# Patient Record
Sex: MALE | Race: WHITE | NOT HISPANIC OR LATINO | Employment: OTHER | URBAN - METROPOLITAN AREA
[De-identification: names, ages, dates, MRNs, and addresses within clinical notes are randomized per-mention and may not be internally consistent; named-entity substitution may affect disease eponyms.]

---

## 2017-01-14 ENCOUNTER — MYC MEDICAL ADVICE (OUTPATIENT)
Dept: FAMILY MEDICINE | Facility: CLINIC | Age: 37
End: 2017-01-14

## 2017-01-14 DIAGNOSIS — F33.0 MAJOR DEPRESSIVE DISORDER, RECURRENT EPISODE, MILD (H): Primary | ICD-10-CM

## 2017-03-01 ENCOUNTER — MYC MEDICAL ADVICE (OUTPATIENT)
Dept: FAMILY MEDICINE | Facility: CLINIC | Age: 37
End: 2017-03-01

## 2017-03-01 DIAGNOSIS — F33.0 MAJOR DEPRESSIVE DISORDER, RECURRENT EPISODE, MILD (H): Primary | ICD-10-CM

## 2017-03-12 DIAGNOSIS — F33.0 MAJOR DEPRESSIVE DISORDER, RECURRENT EPISODE, MILD (H): ICD-10-CM

## 2017-03-12 NOTE — TELEPHONE ENCOUNTER
Trazodone       Last Written Prescription Date: 12/15/16  Last Fill Quantity: 90; # refills: 0  Last Office Visit with [facility], [facility] or  Health prescribing provider:  09/06/16        Last PHQ-9 score on record=   PHQ-9 SCORE 9/6/2016   Total Score 11       Lab Results   Component Value Date    AST 20 02/09/2016     Lab Results   Component Value Date    ALT 31 02/09/2016

## 2017-03-13 RX ORDER — TRAZODONE HYDROCHLORIDE 50 MG/1
50 TABLET, FILM COATED ORAL
Qty: 90 TABLET | Refills: 1 | Status: SHIPPED | OUTPATIENT
Start: 2017-03-13 | End: 2017-04-12 | Stop reason: ALTCHOICE

## 2017-03-21 ENCOUNTER — OFFICE VISIT (OUTPATIENT)
Dept: PSYCHIATRY | Facility: CLINIC | Age: 37
End: 2017-03-21
Attending: PHYSICIAN ASSISTANT
Payer: COMMERCIAL

## 2017-03-21 VITALS
HEART RATE: 106 BPM | TEMPERATURE: 96.8 F | WEIGHT: 186.6 LBS | HEIGHT: 74 IN | SYSTOLIC BLOOD PRESSURE: 130 MMHG | DIASTOLIC BLOOD PRESSURE: 81 MMHG | BODY MASS INDEX: 23.95 KG/M2

## 2017-03-21 DIAGNOSIS — F33.1 MAJOR DEPRESSIVE DISORDER, RECURRENT EPISODE, MODERATE (H): Primary | ICD-10-CM

## 2017-03-21 DIAGNOSIS — F41.1 GAD (GENERALIZED ANXIETY DISORDER): ICD-10-CM

## 2017-03-21 PROCEDURE — 90792 PSYCH DIAG EVAL W/MED SRVCS: CPT | Performed by: CLINICAL NURSE SPECIALIST

## 2017-03-21 RX ORDER — MIRTAZAPINE 15 MG/1
15 TABLET, FILM COATED ORAL AT BEDTIME
Qty: 30 TABLET | Refills: 1 | Status: SHIPPED | OUTPATIENT
Start: 2017-03-21 | End: 2017-04-04 | Stop reason: DRUGHIGH

## 2017-03-21 RX ORDER — BUPROPION HYDROCHLORIDE 150 MG/1
150 TABLET ORAL EVERY MORNING
Qty: 7 TABLET | Refills: 0 | Status: SHIPPED | OUTPATIENT
Start: 2017-03-21 | End: 2017-04-12

## 2017-03-21 ASSESSMENT — ANXIETY QUESTIONNAIRES
5. BEING SO RESTLESS THAT IT IS HARD TO SIT STILL: NOT AT ALL
6. BECOMING EASILY ANNOYED OR IRRITABLE: NEARLY EVERY DAY
IF YOU CHECKED OFF ANY PROBLEMS ON THIS QUESTIONNAIRE, HOW DIFFICULT HAVE THESE PROBLEMS MADE IT FOR YOU TO DO YOUR WORK, TAKE CARE OF THINGS AT HOME, OR GET ALONG WITH OTHER PEOPLE: VERY DIFFICULT
4. TROUBLE RELAXING: SEVERAL DAYS
7. FEELING AFRAID AS IF SOMETHING AWFUL MIGHT HAPPEN: SEVERAL DAYS
GAD7 TOTAL SCORE: 11
3. WORRYING TOO MUCH ABOUT DIFFERENT THINGS: SEVERAL DAYS
1. FEELING NERVOUS, ANXIOUS, OR ON EDGE: NEARLY EVERY DAY
2. NOT BEING ABLE TO STOP OR CONTROL WORRYING: MORE THAN HALF THE DAYS

## 2017-03-21 NOTE — MR AVS SNAPSHOT
After Visit Summary   3/21/2017    Timmy Yousif    MRN: [id number]           Patient Information     Date Of Birth          1980        Visit Information        Provider Department      3/21/2017 1:15 PM Carli Vee APRN [facility]        Today's Diagnoses     Major depressive disorder, recurrent episode, moderate (H)    -  1    ROSARIO (generalized anxiety disorder)          Care Instructions    Treatment Plan:  Discontinue trazodone (Desyrel). Taper to discontinue bupropion (Wellbutrin) XL by reducing to 150 mg for one week, then discontinue.     Begin mirtazapine (Remeron) 15 mg at bedtime.     Follow up in one month.     - Recommend patient discuss medications with their pharmacist.   - Safety plan was reviewed; to the ER as needed or call after hours crisis line; [phone]  - Education and counseling was done regarding use of medications, psychotherapy options  - Call [phone] for appointment or to speak to a nurse.   -Office hours: Monday through Thursday 8:00 am to 4:30 pm; Friday 8:00 am to Noon.   - Patient was given a copy of this Treatment Plan today.        Follow-ups after your visit        Who to contact     If you have questions or need follow up information about today's clinic visit or your schedule please contact [facility] directly at [phone].  Normal or non-critical lab and imaging results will be communicated to you by MyChart, letter or phone within 4 business days after the clinic has received the results. If you do not hear from us within 7 days, please contact the clinic through MyChart or phone. If you have a critical or abnormal lab result, we will notify you by phone as soon as possible.  Submit refill requests through Down or call your pharmacy and they will forward the refill request to us. Please allow 3 business days for your refill to be completed.          Additional Information About Your Visit       "  stickKhart Information     Yumber gives you secure access to your electronic health record. If you see a primary care provider, you can also send messages to your care team and make appointments. If you have questions, please call your primary care clinic.  If you do not have a primary care provider, please call [phone] and they will assist you.        Care EveryWhere ID     This is your Care EveryWhere ID. This could be used by other organizations to access your [place] medical records  [id number]        Your Vitals Were     Pulse Temperature Height BMI (Body Mass Index)          106 96.8  F (36  C) (Tympanic) 6' 2.25\" (1.886 m) 23.8 kg/m2         Blood Pressure from Last 3 Encounters:   03/21/17 130/81   12/30/16 (!) 151/101   09/06/16 137/86    Weight from Last 3 Encounters:   03/21/17 186 lb 9.6 oz (84.6 kg)   12/30/16 190 lb (86.2 kg)   09/06/16 189 lb 3.2 oz (85.8 kg)              Today, you had the following     No orders found for display         Today's Medication Changes          These changes are accurate as of: 3/21/17  2:24 PM.  If you have any questions, ask your nurse or doctor.               Start taking these medicines.        Dose/Directions    mirtazapine 15 MG tablet   Commonly known as:  REMERON   Used for:  Major depressive disorder, recurrent episode, moderate (H), ROSARIO (generalized anxiety disorder)   Started by:  Carli Vee APRN CNS        Dose:  15 mg   Take 1 tablet (15 mg) by mouth At Bedtime   Quantity:  30 tablet   Refills:  1         These medicines have changed or have updated prescriptions.        Dose/Directions    buPROPion 150 MG 24 hr tablet   Commonly known as:  WELLBUTRIN XL   This may have changed:    - medication strength  - how much to take   Used for:  Major depressive disorder, recurrent episode, moderate (H)   Changed by:  Carli Vee APRN CNS        Dose:  150 mg   Take 1 tablet (150 mg) by mouth every morning   Quantity:  7 tablet "   Refills:  0            Where to get your medicines      These medications were sent to [facility] [place], [place] - [address]  [address] [address]     Phone:  [phone]     buPROPion 150 MG 24 hr tablet    mirtazapine 15 MG tablet                Primary Care Provider Office Phone # Fax #    Jair Huffman PA-C [phone]       [address] [address]        Thank you!     Thank you for choosing [facility]  for your care. Our goal is always to provide you with excellent care. Hearing back from our patients is one way we can continue to improve our services. Please take a few minutes to complete the written survey that you may receive in the mail after your visit with us. Thank you!             Your Updated Medication List - Protect others around you: Learn how to safely use, store and throw away your medicines at www.disposemymeds.org.          This list is accurate as of: 3/21/17  2:24 PM.  Always use your most recent med list.                   Brand Name Dispense Instructions for use    buPROPion 150 MG 24 hr tablet    WELLBUTRIN XL    7 tablet    Take 1 tablet (150 mg) by mouth every morning       cyclobenzaprine 10 MG tablet    FLEXERIL    90 tablet    Take 1 tablet (10 mg) by mouth 3 times daily as needed for muscle spasms       lisinopril 10 MG tablet    PRINIVIL/ZESTRIL    90 tablet    Take 1 tablet (10 mg) by mouth daily       mirtazapine 15 MG tablet    REMERON    30 tablet    Take 1 tablet (15 mg) by mouth At Bedtime       oxyCODONE-acetaminophen 5-325 MG per tablet    PERCOCET    20 tablet    Take 1-2 tablets by mouth every 4 hours as needed for moderate to severe pain       traZODone 50 MG tablet    DESYREL    90 tablet    Take 1 tablet (50 mg) by mouth nightly as needed for sleep

## 2017-03-21 NOTE — PROGRESS NOTES
Outpatient Psychiatric Evaluation-Standard    Name: Timmy Yousif  : 1980  Date: 3/21/2017    Source of Referral:  Primary Care Physician: Jair Huffman  Current Psychotherapist: None     Identifying Data:  Patient is a 36 year old,  male who presents for initial visit with me.  Patient is currently employed part time, .  Patient attended the session with wife, Sugar, verbal permission was given for Sugar to remain in the appointment.   60 minutes were spent on evaluation with 40 minutes CC time.    HPI:  Patient reports approximately three years ago, patient noticed depression and poor sleep, was diagnosed with ELISHA. Patient is currently using dental appliance. Patient reports over the past three years, has tried citalopram (Celexa) was ineffective, venlafaxine (Effexor) took one dose and experienced hyperhidrosis and increased irritability. Patient has been taking bupropion (Wellbutrin)  mg for the past several months. Patient reports no change since starting or increasing the bupropion (Wellbutrin) dose. Patient reports anxiety started related to work stress and raising three small children, leading to depression.     Psychiatric Review of Symptoms:  Depression: Sleep: Decrease and ELISHA diagnosis two years ago using dental appliance.   Appetite: Decrease and weight loss over the last couple years and has leveled out.   Depressed Mood Interest: Decrease Energy: Decrease Concentration: Decrease Worthless: Increase and in the past year.     Last PHQ-9 score = No Value exists for the : HP#PHQ9  Quyen:  No symptoms  Mood Disorder Questionnaire: Negative    Anxiety: Feeling nervous or on edge  Uncontrolled worrying  Trouble relaxing  Easily annoyed or irritable  Thoughts of impending doom    GAD7 score: 11  Panic:  No symptoms  Palpitations  Shortness of Breath  up to twice per month  Agoraphobia:  No  OCD:  No  "symptoms  Psychosis: No symptoms  ADD / ADHD: No symptoms  Gambling or shoplifting: No  Eating Disorder:  No symptoms  Suicide attempts:  No  Current SI risk:  No Protective factors: family    Significant Losses / Trauma / Abuse / Neglect Issues:  There are no indications or report of: significant losses, trauma, abuse or neglect.    PTSD:  No symptoms    Issues of possible neglect are not present.    A safety and risk management plan has not been developed at this time, however client was given the after-hours number / 911 should there be a change in any of these risk factors.      Psychiatric History:   Hospitalizations: None  Past psychotherapy: medication(s) from physician / PCP    Past medication trials: (patient was presented with a list to review all currently available antidepressants, mood stabilizers, tranquilizers, hypnotics and antipsychotics)  New Antidepressants:  Celexa (citalopram) and Wellbutrin, Zyban, Aplenzin (bupropion), venlafaxine (Effexor)   Sedatives/Hypnotics:  Desyrel (trazadone)  Tranquilizers: diazepam (Valium)       Chemical Use History:  Patient has not received chemical dependency treatment in the past.  Patient reports no problems as a result of their drinking / drug use.  Current use of drugs or alcohol: alcohol- occasional  CAGE: G     Patient felt bad or GUILTY about their drinking (or drug use).   Based on the negative Cage-Aid score and clinical interview there  are not indications of drug or alcohol abuse.  Tobacco use: No  Ready to quit?  No  NRT tried: NA    Past Medical History:  Surgery: No past surgical history on file.  Allergies:    Allergies   Allergen Reactions     Penicillin [Esters]      Primary MD: Jair Huffman  Seizures or head injury: No  Diet: \"Normal\"  Exercise: no regular exercise program  Supplements: Vitamin D    Current Medications:  Current Outpatient Prescriptions   Medication Sig     traZODone (DESYREL) 50 MG tablet Take 1 tablet (50 mg) by mouth " "nightly as needed for sleep     oxyCODONE-acetaminophen (PERCOCET) 5-325 MG per tablet Take 1-2 tablets by mouth every 4 hours as needed for moderate to severe pain     buPROPion (WELLBUTRIN XL) 300 MG 24 hr tablet Take 1 tablet (300 mg) by mouth every morning     cyclobenzaprine (FLEXERIL) 10 MG tablet Take 1 tablet (10 mg) by mouth 3 times daily as needed for muscle spasms     lisinopril (PRINIVIL,ZESTRIL) 10 MG tablet Take 1 tablet (10 mg) by mouth daily     No current facility-administered medications for this visit.        Vital Signs:  /81 (BP Location: Left arm, Patient Position: Chair, Cuff Size: Adult Regular)  Pulse 106  Temp 96.8  F (36  C) (Tympanic)  Ht 6' 2.25\" (1.886 m)  Wt 186 lb 9.6 oz (84.6 kg)  BMI 23.8 kg/m2      Review of Systems:  (constitutional, HEENT, Neuro, Cardiac, Pulmonary, GI, , Heme / Lymph, Endocrine, Skin / Breast, MSK reviewed)  10 point ROS was negative except for the following: chronic back pain.     Family History:   (with focus on psychiatric and substance abuse)  Chemical use problems None  Mental health history: mother and sister - depression and anxiety  Patient reports family history includes CANCER in his father; DIABETES in his mother; Other Cancer in his father.    Social History:   Patient grew up in [place], [place]    Siblings: 2 sisters  Intact family growing up?; Yes  Highest education level was high school graduate.   Marital status and living situation: Lives with wife, Sugar and three daughters.   three children. Ages 4, 2, 5 months  he has not been involved with the legal system.      Mental Status Assessment:     Appearance:  Well groomed      Behavior/relationship to examiner/demeanor:  Cooperative, engaged and pleasant  Motor activity:  Normal  Gait:  Normal   Speech:  Normal in volume, articulation, coherence   Mood (subjective report):  \"poor\"  Affect (objective appearance):  Mood congruent  Thought Process (Associations):  Logical, linear and goal " directed  Thought content:  No evidence of suicidal or homicidal ideation,          no overt psychosis and                    patient does not appear to be responding to internal stimuli  Oriented to person, place, date/time   Attention Span and concentration: Intact   Memory:  Short-term memory intact and Long-term memory; Intact  Language:  Fluent   Fund of Knowledge/Intelligence:  Average  Use of language: Intact   Abstraction:  Normal  Insight:  Adequate  Judgment:  Adequate for safety    DSM5  Diagnosis:    296.22 Major Depressive Disorder, Single Episode, Moderate _ and With anxious distress  300.01 (F41.0) Panic Disorder  300.02 (F41.1) Generalized Anxiety Disorder  Psychosocial & Contextual Factors: financial issues, childcare issues.     Strengths and Liabilities:   Patient identified the following strengths or resources that will help him  succeed in counseling: friends / good social support and family support.  Things that may interfere with the patient's success include:financial hardship.    WHODAS 2.0 TOTAL SCORES 3/21/2017   Total Score 28         Impression:  Patient has anxiety leading to depressive symptoms. Patient has tried citalopram (Celexa) and venlafaxine (Effexor) and now bupropion (Wellbutrin) with really no benefit. Patient is not sleeping well. Mirtazapine (Remeron) is a good option for anxiety, depression and sleep. Another option is to try sertraline (Zoloft) which is helpful for his mother.     Medication side effects and alternatives reviewed.     Treatment Plan:  Discontinue trazodone (Desyrel). Taper to discontinue bupropion (Wellbutrin) XL by reducing to 150 mg for one week, then discontinue.     Begin mirtazapine (Remeron) 15 mg at bedtime.     Follow up in one month.     - Recommend patient discuss medications with their pharmacist.   - Safety plan was reviewed; to the ER as needed or call after hours crisis line; [phone]  - Education and counseling was done regarding use of  medications, psychotherapy options  - Call [phone] for appointment or to speak to a nurse.   -Office hours: Monday through Thursday 8:00 am to 4:30 pm; Friday 8:00 am to Noon.   - Patient was given a copy of this Treatment Plan today.     My Practice Policy was reviewed and signed: YES       Patient will continue to be seen for ongoing consultation and stabilization.      Signed: Carli Vee RN, MS, APRN                 Psychiatry

## 2017-03-21 NOTE — PATIENT INSTRUCTIONS
Treatment Plan:  Discontinue trazodone (Desyrel). Taper to discontinue bupropion (Wellbutrin) XL by reducing to 150 mg for one week, then discontinue.     Begin mirtazapine (Remeron) 15 mg at bedtime.     Follow up in one month.     - Recommend patient discuss medications with their pharmacist.   - Safety plan was reviewed; to the ER as needed or call after hours crisis line; [phone]  - Education and counseling was done regarding use of medications, psychotherapy options  - Call [phone] for appointment or to speak to a nurse.   -Office hours: Monday through Thursday 8:00 am to 4:30 pm; Friday 8:00 am to Noon.   - Patient was given a copy of this Treatment Plan today.

## 2017-03-22 ASSESSMENT — PATIENT HEALTH QUESTIONNAIRE - PHQ9: SUM OF ALL RESPONSES TO PHQ QUESTIONS 1-9: 18

## 2017-03-22 ASSESSMENT — ANXIETY QUESTIONNAIRES: GAD7 TOTAL SCORE: 11

## 2017-03-23 ENCOUNTER — TELEPHONE (OUTPATIENT)
Dept: PSYCHIATRY | Facility: CLINIC | Age: 37
End: 2017-03-23

## 2017-03-23 NOTE — TELEPHONE ENCOUNTER
----- Message from Carli Vee, CARIE CNS sent at 3/22/2017  7:42 AM CDT -----  Please contact this patient for [employer] testing. Thanks.

## 2017-03-23 NOTE — TELEPHONE ENCOUNTER
Outreach attempted to discuss Genesight testing. Number of [employer] customer service left for patient on his voicemail. He needs to call us if and when he decides to send the test in. He should call [employer] directly to discuss his financial and insurance information with them.

## 2017-04-04 ENCOUNTER — MYC MEDICAL ADVICE (OUTPATIENT)
Dept: PSYCHIATRY | Facility: CLINIC | Age: 37
End: 2017-04-04

## 2017-04-04 DIAGNOSIS — F33.1 MAJOR DEPRESSIVE DISORDER, RECURRENT EPISODE, MODERATE (H): Primary | ICD-10-CM

## 2017-04-04 RX ORDER — MIRTAZAPINE 30 MG/1
30 TABLET, FILM COATED ORAL AT BEDTIME
Qty: 30 TABLET | Refills: 1 | Status: SHIPPED | OUTPATIENT
Start: 2017-04-04 | End: 2017-05-31

## 2017-04-04 NOTE — TELEPHONE ENCOUNTER
Try mirtazapine (Remeron) 30 mg daily -  He may need to take it earlier in the evening. I will send a new Rx.

## 2017-04-06 ENCOUNTER — MYC MEDICAL ADVICE (OUTPATIENT)
Dept: PSYCHIATRY | Facility: CLINIC | Age: 37
End: 2017-04-06

## 2017-04-07 ENCOUNTER — TELEPHONE (OUTPATIENT)
Dept: PSYCHIATRY | Facility: CLINIC | Age: 37
End: 2017-04-07

## 2017-04-07 NOTE — TELEPHONE ENCOUNTER
Reason for call: Other   Patient called regarding (reason for call): Wife is requesting genesight testing be ordered.  Additional comments: Verbal permission was given to speak with wife.    Phone Number Pt can be reached at: Home number on file [phone] (home)  Best Time: anytime  Can we leave a detailed message on this number? VM was left so this info was not gathered

## 2017-04-07 NOTE — TELEPHONE ENCOUNTER
GeneSight test ordered.   Patient Medical Record Information  Timmy Yousif  : 1980        Diagnoses  According to DSM-V criteria, patient is suffering from refractory moderate to severe depression.          ICD-10 Codes    (F33.1) Major depressive disorder, recurrent, moderate        (F33.1) Major depressive disorder, recurrent, moderate        (F33.1) Major depressive disorder, recurrent, moderate        Clinical Notations  Patient has failed or is currently failing at least one neuropsychiatric medication.  Medication(s) failed or failing: Celexa, Wellbutrin, Effexor    Patient has failed or is currently failing at least one neuropsychiatric medication and I am contemplating an alteration in a neuropsychiatric medication treatment.  I am considering folate supplementation for patient.        I am contemplating an alteration in a neuropsychiatric medication treatment, which may include medication elimination, switching, augmentation, and/or dose adjustment, and therefore have ordered [employer] Psychotropic, ADHD, MTHFR on 2017.      Ordered at the direction of: Carli Vee

## 2017-04-07 NOTE — TELEPHONE ENCOUNTER
Pt sent [employer] message. Ordered [employer] and sent [employer] message back to Pt.

## 2017-04-12 ENCOUNTER — TRANSFERRED RECORDS (OUTPATIENT)
Dept: HEALTH INFORMATION MANAGEMENT | Facility: CLINIC | Age: 37
End: 2017-04-12

## 2017-04-12 ENCOUNTER — OFFICE VISIT (OUTPATIENT)
Dept: PSYCHIATRY | Facility: CLINIC | Age: 37
End: 2017-04-12
Payer: COMMERCIAL

## 2017-04-12 VITALS
HEART RATE: 101 BPM | DIASTOLIC BLOOD PRESSURE: 82 MMHG | BODY MASS INDEX: 25 KG/M2 | SYSTOLIC BLOOD PRESSURE: 132 MMHG | TEMPERATURE: 97.4 F | WEIGHT: 196 LBS

## 2017-04-12 DIAGNOSIS — F33.1 MAJOR DEPRESSIVE DISORDER, RECURRENT EPISODE, MODERATE (H): Primary | ICD-10-CM

## 2017-04-12 DIAGNOSIS — F41.1 GAD (GENERALIZED ANXIETY DISORDER): ICD-10-CM

## 2017-04-12 PROCEDURE — 99214 OFFICE O/P EST MOD 30 MIN: CPT | Performed by: CLINICAL NURSE SPECIALIST

## 2017-04-12 ASSESSMENT — ANXIETY QUESTIONNAIRES
2. NOT BEING ABLE TO STOP OR CONTROL WORRYING: MORE THAN HALF THE DAYS
GAD7 TOTAL SCORE: 14
5. BEING SO RESTLESS THAT IT IS HARD TO SIT STILL: MORE THAN HALF THE DAYS
6. BECOMING EASILY ANNOYED OR IRRITABLE: NEARLY EVERY DAY
7. FEELING AFRAID AS IF SOMETHING AWFUL MIGHT HAPPEN: SEVERAL DAYS
IF YOU CHECKED OFF ANY PROBLEMS ON THIS QUESTIONNAIRE, HOW DIFFICULT HAVE THESE PROBLEMS MADE IT FOR YOU TO DO YOUR WORK, TAKE CARE OF THINGS AT HOME, OR GET ALONG WITH OTHER PEOPLE: SOMEWHAT DIFFICULT
3. WORRYING TOO MUCH ABOUT DIFFERENT THINGS: MORE THAN HALF THE DAYS
1. FEELING NERVOUS, ANXIOUS, OR ON EDGE: MORE THAN HALF THE DAYS

## 2017-04-12 ASSESSMENT — PATIENT HEALTH QUESTIONNAIRE - PHQ9: 5. POOR APPETITE OR OVEREATING: MORE THAN HALF THE DAYS

## 2017-04-12 NOTE — PROGRESS NOTES
"      Psychiatric Progress Note    Name: Timmy Yousif  Date: 4/12/2017  Length of Visit: 30 minutes  MRN: [id number]      Current Outpatient Prescriptions   Medication Sig     mirtazapine (REMERON) 30 MG tablet Take 1 tablet (30 mg) by mouth At Bedtime     oxyCODONE-acetaminophen (PERCOCET) 5-325 MG per tablet Take 1-2 tablets by mouth every 4 hours as needed for moderate to severe pain     cyclobenzaprine (FLEXERIL) 10 MG tablet Take 1 tablet (10 mg) by mouth 3 times daily as needed for muscle spasms     lisinopril (PRINIVIL,ZESTRIL) 10 MG tablet Take 1 tablet (10 mg) by mouth daily     buPROPion (WELLBUTRIN XL) 150 MG 24 hr tablet Take 1 tablet (150 mg) by mouth every morning (Patient not taking: Reported on 4/12/2017)     traZODone (DESYREL) 50 MG tablet Take 1 tablet (50 mg) by mouth nightly as needed for sleep (Patient not taking: Reported on 4/12/2017)     No current facility-administered medications for this visit.        Therapist:  None    PHQ-9:  PHQ-9 score:    PHQ-9 SCORE 3/21/2017   Total Score 18       ROSARIO-7:  ROSARIO-7 SCORE 8/25/2016 9/6/2016 3/21/2017   Total Score 13 12 11           Interim History:  Patient returns for follow up from initial appointment 3-. Mirtazapine (Remeron) 15 mg at bedtime was started. Patient phoned in reporting morning grogginess and the dose was increased to mirtazapine (Remeron) 30 mg at bedtime. Patient is accompanied by his wife, Sugar.     Today, patient reports sleeping 7-8 hours per night. Patient is having a difficult time waking up in the morning, lasting a few hours. Patient reports he feels better and \"My body feels more comfortable\". Patient states \"Food tastes good again\".     Sugar reports some improvement in patient's mood. Sugar reports going online reviewing ADHD symptoms and thinks this may a concern. Patient reports difficulty with task completion, fidgety, and decreased focus.      Past Medical History:   Diagnosis Date     Arthritis December 2009 " "   Back     Benign essential hypertension        10 point ROS is negative except for those listed above.     Vital Signs:   /82 (BP Location: Right arm, Patient Position: Chair, Cuff Size: Adult Regular)  Pulse 101  Temp 97.4  F (36.3  C) (Tympanic)  Wt 196 lb (88.9 kg)  BMI 25 kg/m2      Mental Status Assessment:  Appearance:  Well groomed      Behavior/relationship to examiner/demeanor:  Cooperative, engaged and pleasant  Motor activity:  Normal  Gait:  Normal   Speech:  Normal in volume, articulation, coherence   Mood (subjective report):  \"My body feels better\".   Affect (objective appearance):  Mood congruent  Thought Process (Associations):  Logical, linear and goal directed  Thought content:  No evidence of suicidal or homicidal ideation,          no overt psychosis and                    patient does not appear to be responding to internal stimuli  Oriented to person, place, date/time   Attention Span and concentration: Intact   Memory:  Short-term memory intact and Long-term memory; Intact  Language:  Fluent   Fund of Knowledge/Intelligence:  Average  Use of language: Intact   Abstraction:  Normal  Insight:  Adequate  Judgment:  Adequate for safety    DSM DIAGNOSIS:  296.22 Major Depressive Disorder, Single Episode, Moderate _ and With anxious distress  300.01 (F41.0) Panic Disorder  300.02 (F41.1) Generalized Anxiety Disorder    Assessment:  Patient is more talkative in the appointment today which is an improvement. He does not look for his wife to answer questions for him. He is sleeping well, but is having some difficulty waking in the morning, so will try taking mirtazapine (Remeron) earlier in the evening. Patient's appetite has returned and he is pleased with that. Patient and his wife question whether he may have ADHD and request to complete testing.     Medication side effects and alternatives were reviewed.     Treatment Plan:  Continue mirtazapine (Remeron) 30 mg, taking earlier in the " evening - try 8 pm or 6 pm.     Schedule ADHD testing.     Follow up with me after ADHD testing.     - Recommend patient discuss medications with their pharmacist.   - Safety plan was reviewed; to the ER as needed or call after hours crisis line; [phone]  - Education and counseling was done regarding use of medications, psychotherapy options  - Call [phone] for appointment or to speak to a nurse.    -Office hours: Monday through Thursday 8:00 am to 4:30 pm; Friday 8:00 am to Noon.   - Patient received a copy of this Treatment Plan today.    Patient will continue to be seen for ongoing consultation and stabilization.      Signed:  Carli Vee, RN, MS, CNS-BC

## 2017-04-12 NOTE — PATIENT INSTRUCTIONS
Treatment Plan:  Continue mirtazapine (Remeron) 30 mg, taking earlier in the evening - try 8 pm or 6 pm.     Schedule ADHD testing.     Follow up with me after ADHD testing.      - Recommend patient discuss medications with their pharmacist.   - Safety plan was reviewed; to the ER as needed or call after hours crisis line; [phone]  - Education and counseling was done regarding use of medications, psychotherapy options  - Call [phone] for appointment or to speak to a nurse.    -Office hours: Monday through Thursday 8:00 am to 4:30 pm; Friday 8:00 am to Noon.   - Patient received a copy of this Treatment Plan today.

## 2017-04-12 NOTE — MR AVS SNAPSHOT
After Visit Summary   4/12/2017    Timmy Yousif    MRN: [id number]           Patient Information     Date Of Birth          1980        Visit Information        Provider Department      4/12/2017 2:15 PM Carli Vee APRN [facility]        Care Instructions    Treatment Plan:  Continue mirtazapine (Remeron) 30 mg, taking earlier in the evening - try 8 pm or 6 pm.     Schedule ADHD testing.     Follow up with me after ADHD testing.      - Recommend patient discuss medications with their pharmacist.   - Safety plan was reviewed; to the ER as needed or call after hours crisis line; [phone]  - Education and counseling was done regarding use of medications, psychotherapy options  - Call [phone] for appointment or to speak to a nurse.    -Office hours: Monday through Thursday 8:00 am to 4:30 pm; Friday 8:00 am to Noon.   - Patient received a copy of this Treatment Plan today.        Follow-ups after your visit        Who to contact     If you have questions or need follow up information about today's clinic visit or your schedule please contact [facility] directly at [phone].  Normal or non-critical lab and imaging results will be communicated to you by [employer], letter or phone within 4 business days after the clinic has received the results. If you do not hear from us within 7 days, please contact the clinic through [employer] or phone. If you have a critical or abnormal lab result, we will notify you by phone as soon as possible.  Submit refill requests through WebChalet or call your pharmacy and they will forward the refill request to us. Please allow 3 business days for your refill to be completed.          Additional Information About Your Visit        MyChart Information     Web[employer]et gives you secure access to your electronic health record. If you see a primary care provider, you can also send messages to your care team and make  appointments. If you have questions, please call your primary care clinic.  If you do not have a primary care provider, please call [phone] and they will assist you.        Care EveryWhere ID     This is your Care EveryWhere ID. This could be used by other organizations to access your [facility] medical records  [id number]        Your Vitals Were     Pulse Temperature BMI (Body Mass Index)             101 97.4  F (36.3  C) (Tympanic) 25 kg/m2          Blood Pressure from Last 3 Encounters:   04/12/17 132/82   03/21/17 130/81   12/30/16 (!) 151/101    Weight from Last 3 Encounters:   04/12/17 196 lb (88.9 kg)   03/21/17 186 lb 9.6 oz (84.6 kg)   12/30/16 190 lb (86.2 kg)              Today, you had the following     No orders found for display         Today's Medication Changes          These changes are accurate as of: 4/12/17  2:51 PM.  If you have any questions, ask your nurse or doctor.               Stop taking these medicines if you haven't already. Please contact your care team if you have questions.     traZODone 50 MG tablet   Commonly known as:  DESYREL   Stopped by:  Carli Vee APRN CNS                    Primary Care Provider Office Phone # Fax #    Jair Huffman PA-C [phone]       [address] [address]        Thank you!     Thank you for choosing [facility]  for your care. Our goal is always to provide you with excellent care. Hearing back from our patients is one way we can continue to improve our services. Please take a few minutes to complete the written survey that you may receive in the mail after your visit with us. Thank you!             Your Updated Medication List - Protect others around you: Learn how to safely use, store and throw away your medicines at www.disposemymeds.org.          This list is accurate as of: 4/12/17  2:51 PM.  Always use your most recent med list.                   Brand Name  Dispense Instructions for use    cyclobenzaprine 10 MG tablet    FLEXERIL    90 tablet    Take 1 tablet (10 mg) by mouth 3 times daily as needed for muscle spasms       lisinopril 10 MG tablet    PRINIVIL/ZESTRIL    90 tablet    Take 1 tablet (10 mg) by mouth daily       mirtazapine 30 MG tablet    REMERON    30 tablet    Take 1 tablet (30 mg) by mouth At Bedtime       oxyCODONE-acetaminophen 5-325 MG per tablet    PERCOCET    20 tablet    Take 1-2 tablets by mouth every 4 hours as needed for moderate to severe pain

## 2017-04-13 ASSESSMENT — PATIENT HEALTH QUESTIONNAIRE - PHQ9: SUM OF ALL RESPONSES TO PHQ QUESTIONS 1-9: 14

## 2017-04-13 ASSESSMENT — ANXIETY QUESTIONNAIRES: GAD7 TOTAL SCORE: 14

## 2017-05-10 ENCOUNTER — FCC EXTENDED DOCUMENTATION (OUTPATIENT)
Dept: PSYCHOLOGY | Facility: CLINIC | Age: 37
End: 2017-05-10

## 2017-05-10 ENCOUNTER — OFFICE VISIT (OUTPATIENT)
Dept: PSYCHOLOGY | Facility: CLINIC | Age: 37
End: 2017-05-10
Payer: COMMERCIAL

## 2017-05-10 DIAGNOSIS — F33.0 MAJOR DEPRESSIVE DISORDER, RECURRENT EPISODE, MILD (H): Primary | ICD-10-CM

## 2017-05-10 PROCEDURE — 90834 PSYTX W PT 45 MINUTES: CPT | Performed by: PSYCHOLOGIST

## 2017-05-10 ASSESSMENT — ANXIETY QUESTIONNAIRES
1. FEELING NERVOUS, ANXIOUS, OR ON EDGE: MORE THAN HALF THE DAYS
6. BECOMING EASILY ANNOYED OR IRRITABLE: NEARLY EVERY DAY
IF YOU CHECKED OFF ANY PROBLEMS ON THIS QUESTIONNAIRE, HOW DIFFICULT HAVE THESE PROBLEMS MADE IT FOR YOU TO DO YOUR WORK, TAKE CARE OF THINGS AT HOME, OR GET ALONG WITH OTHER PEOPLE: VERY DIFFICULT
3. WORRYING TOO MUCH ABOUT DIFFERENT THINGS: SEVERAL DAYS
7. FEELING AFRAID AS IF SOMETHING AWFUL MIGHT HAPPEN: NOT AT ALL
GAD7 TOTAL SCORE: 11
5. BEING SO RESTLESS THAT IT IS HARD TO SIT STILL: NEARLY EVERY DAY
2. NOT BEING ABLE TO STOP OR CONTROL WORRYING: NOT AT ALL

## 2017-05-10 ASSESSMENT — PATIENT HEALTH QUESTIONNAIRE - PHQ9: 5. POOR APPETITE OR OVEREATING: MORE THAN HALF THE DAYS

## 2017-05-10 NOTE — MR AVS SNAPSHOT
MRN:[id number]                      After Visit Summary   5/10/2017    Timmy Yousif    MRN: [id number]           Visit Information        Provider Department      5/10/2017 1:00 PM Radha Hurt, PhD [facility] ADHD      Your next 10 appointments already scheduled     May 17, 2017  3:00 PM CDT   Return Visit with Radha Hurt, PhD   [facility] ([facility])    [address] [place] [id number] [phone]              MyChart Information     G[employer] gives you secure access to your electronic health record. If you see a primary care provider, you can also send messages to your care team and make appointments. If you have questions, please call your primary care clinic.  If you do not have a primary care provider, please call [phone] and they will assist you.        Care EveryWhere ID     This is your Care EveryWhere ID. This could be used by other organizations to access your [facility] medical records  [id number]

## 2017-05-10 NOTE — PROGRESS NOTES
Progress Note - Initial Session    Client Name:  Timmy Yousif Date: 5/10/2017         Service Type: Individual/ADHD Eval Intake      Session Start Time: 1:00  Session End Time: 1:45pm      Session Length: 45 minutes     Session #: 1     Attendees: Client attended alone    Diagnostic Assessment in progress.  Unable to complete documentation at the conclusion of the first session due to Unable to complete documentation at the conclusion of the first session due to gathering extensive information regarding symptom presentation, history, and impact on functioning. Client reported significant history of inattention. No risk issues reported.      Mental Status Assessment:  Appearance:   Appropriate   Eye Contact:   Good   Psychomotor Behavior: Normal   Attitude:   Cooperative   Orientation:   All  Speech   Rate / Production: Normal    Volume:  Normal   Mood:    Normal  Affect:    Appropriate   Thought Content:  Clear   Thought Form:  Coherent  Logical   Insight:    Good       Safety Issues and Plan for Safety and Risk Management:  Client denies current fears or concerns for personal safety.  Client denies current or recent suicidal ideation or behaviors.  Client denies current or recent homicidal ideation or behaviors.  Client denies current or recent self injurious behavior or ideation.  Client denies other safety concerns.  A safety and risk management plan has not been developed at this time, however client was given the after-hours number / 911 should there be a change in any of these risk factors.  Client reports there are no firearms in the house.      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 Health conditions  include  disease or illnesses, other health problems that may be short or long lasting,  injuries, mental health or emotional problems, and problems with alcohol or drugs.                     Think back over the past 30 days and answer these questions, thinking about how much  difficulty you had doing the following activities. For each question, please [place] only  one response.    S1 Standing for long periods such as 30 minutes? None =         1   S2 Taking care of household responsibilities? Moderate =   3   S3 Learning a new task, for example, learning how to get to a new place? Mild =           2   S4 How much of a problem do you have joining community activities (for example, festivals, [employer] or other activities) in the same way as anyone else can? Mild =           2   S5 How much have you been emotionally affected by your health problems? Moderate =   3     In the past 30 days, how much difficulty did you have in:   S6 Concentrating on doing something for ten minutes? Moderate =   3   S7 Walking a long distance such as a kilometer (or equivalent)? None =         1   S8 Washing your whole body? None =         1   S9 Getting dressed? None =         1   S10 Dealing with people you do not know? Mild =           2   S11 Maintaining a friendship? Mild =           2   S12 Your day to day work? Severe =       4     H1 Overall, in the past 30 days, how many days were these difficulties present? Record number of days \"most\"   H2 In the past 30 days, for how many days were you totally unable to carry out your usual activities or work because of any health condition? Record number of days  \"none\"   H3 In the past 30 days, not counting the days that you were totally unable, for how many days did you cut back or reduce your usual activities or work because of any health condition? Record number of days \"none\"       Collateral Reports Completed:  Routed note to Care Team " Member(s)      PLAN: (Homework, other):  RTC in one week to complete ADHD DA. Client was given self-report and collateral-report measures to complete and bring to our next session.      Radha Hurt, PhD, LP

## 2017-05-10 NOTE — Clinical Note
Manjeet Boyce,  I met with Timmy today to begin the ADHD evaluation. I will be sure to route the DA and final report to you. Please let me know if you have any questions or concerns. Thank you for the referral!  Radha Hurt

## 2017-05-11 ASSESSMENT — ANXIETY QUESTIONNAIRES: GAD7 TOTAL SCORE: 11

## 2017-05-11 ASSESSMENT — PATIENT HEALTH QUESTIONNAIRE - PHQ9: SUM OF ALL RESPONSES TO PHQ QUESTIONS 1-9: 10

## 2017-05-17 ENCOUNTER — DOCUMENTATION ONLY (OUTPATIENT)
Dept: PSYCHOLOGY | Facility: CLINIC | Age: 37
End: 2017-05-17
Payer: COMMERCIAL

## 2017-05-17 ENCOUNTER — OFFICE VISIT (OUTPATIENT)
Dept: PSYCHOLOGY | Facility: CLINIC | Age: 37
End: 2017-05-17
Payer: COMMERCIAL

## 2017-05-17 DIAGNOSIS — R69 DIAGNOSIS DEFERRED: Primary | ICD-10-CM

## 2017-05-17 PROCEDURE — 90791 PSYCH DIAGNOSTIC EVALUATION: CPT | Performed by: PSYCHOLOGIST

## 2017-05-17 PROCEDURE — 96101 HC PSYCHOLOGICAL TEST BY PSYCHOLOGIST/MD, PER HR: CPT | Performed by: PSYCHOLOGIST

## 2017-05-17 NOTE — PROGRESS NOTES
"Client Name: Timmy Yousif  MRN: [id number]  : 1980    Jeri Adult ADHD Rating Scale-IV: Self and Other Reports (BAARS-IV)  The BAARS-IV assesses for symptoms of ADHD that are experienced in one's daily life. This assessment measure includes self and collateral rating scales designed to provide information regarding current and childhood symptoms of ADHD including inattention, hyperactivity, and impulsivity. Self-report scores are reported as percentiles. Scores at the 76th-83rd percentile are considered marginal, scores at the 84th-92nd percentile are considered borderline, scores at the 93rd-95th percentile are considered mild, scores at the 96th-98th percentile are considered moderate, and those at the 99th percentile are considered severe. Collateral or \"other\" rating scales are reported as number of symptoms observed in comparison to those reported by the client. Norms and percentile scores are not available for collateral reports.      Current Symptoms Scale--Self Report:   Client completed the self-report inventory of current symptoms. The results indicate that the client's Total ADHD Score was 61 which places him in the 99th percentile for overall ADHD symptoms. In addition, the client endorsed the following occur \"often\" or \"very often\": 7/9 (98th percentile) Inattention symptoms, 7/9 (98th percentile) Hyperactivity/Impulsivity symptoms, and 4/9 (93rd percentile) Sluggish Cognitive Tempo symptoms. Client indicated that the reported symptoms have resulted in impaired functioning in school, home, and work. Overall, the results suggest the client is reporting moderate symptoms of hyperactivity/impulsivity and inattention at this time.      Current Symptoms Scale--Other Report:  Client's wife completed the collateral report inventory of current symptoms. Based on the collateral contact's observation of symptoms, the client demonstrates the following \"often\" or \"very often\": 6/9 Inattention symptoms, " "4/5 Hyperactivity symptoms, 3/4 Impulsivity symptoms, and 4/9 Sluggish Cognitive Tempo symptoms. The client's Total ADHD Score was 54. The collateral contact indicated the client demonstrated impaired functioning in work, home and social relationships. The collateral- and self-report scores are similar and suggest that the Client is experiencing symptoms of inattention and hyperactivity/impulsivity at this time.     Childhood Symptoms Scale--Self-Report:  Client completed the self-report inventory of childhood symptoms. The results indicate that the client's Total ADHD Score was 57 which places him in the 98th percentile for overall ADHD symptoms in childhood. In addition, the client endorsed having experienced the following \"often\" or \"very often\": 9/9 (99th percentile) Inattention symptoms and 8/9 (98th percentile) Hyperactivity-Impulsivity symptoms. Client indicated that the reported symptoms resulted in impaired functioning in school and home. Overall, the results suggest the client reported experiencing severe symptoms of inattention and moderate symptoms of hyperactivity/impulsivity as a child.     Childhood Symptoms Scale--Other Report:  Client's mother completed the collateral report inventory of childhood symptoms. Based on the collateral contact's recollection of client's childhood symptoms, the client demonstrated the following \"often\" or \"very often\": 7/9 Inattention symptoms and 8/9 Hyperactivity-Impulsivity symptoms. The client's Total ADHD Score was 56. The collateral contact indicated the client demonstrated impaired functioning in school, home, and social relationships. The collateral- and self-report scores are similar and suggest that the Client experienced/demonstrated symptoms of inattention and hyperactivity/impulsivity as a child.        Jeri Functional Impairment Scale: Self and Other Reports (BFIS)  The BFIS is used to assess an individuals' psychosocial impairment in major life/daily " "activities that may be due to a mental health disorder. This assessment measure includes self and collateral rating scales. Self-report scores are reported as percentiles. Scores at the 76th-83rd percentile are considered marginal, scores at the 84th-92nd percentile are considered borderline, scores at the 93rd-95th percentile are considered mild, scores at the 96th-98th percentile are considered moderate, and those at the 99th percentile are considered severe. Collateral or \"other\" rating scales are reported as number of symptoms observed in comparison to those reported by the client. Norms and percentile scores are not available for collateral reports.      Results indicate the client identified impairment (scores at or greater than 93rd percentile) in the following areas: home-family, home-chores, work, community activities, money management, driving, and daily responsibilities. The client's Mean Impairment Score was 5 (89th percentile) indicating the client is reporting borderline impairment in functioning across domains. Client's wife completed the collateral rating scale, which indicated similar results. The collateral contact's scores were generally higher than the client's report (e.g., Mean Impairment Score of 6.54). Client's wife noted impairment in the domains of home-family, home-chores, work, social-friends, community activities, marriage, driving, daily responsibilities, and childrearing.      Jeri Deficits in Executive Functioning Scale (BDEFS)  The BDEFS is a measure used for evaluating dimensions of adult executive functioning in daily life. This assessment measure includes self and collateral rating scales. Self-report scores are reported as percentiles. Scores at the 76th-83rd percentile are considered marginal, scores at the 84th-92nd percentile are considered borderline, scores at the 93rd-95th percentile are considered mild, scores at the 96th-98th percentile are considered moderate, and " "those at the 99th percentile are considered severe. Collateral or \"other\" rating scales are reported as number of symptoms observed in comparison to those reported by the client. Norms and percentile scores are not available for collateral reports.      Results indicate the client's Total Executive Functioning Score was 231 (95th percentile). The ADHD-Executive Functioning Index score was 30 (97th percentile). These scores suggest the client has mild to moderate deficits in executive functioning. These deficits may be due to ADHD or another mental health disorder. Results indicate the client identified significant deficits in the following areas: self-management to time (moderate), self-organization/problem solving (moderate), self-restraint (mild), and self-motivation (severe). Client's wife completed the collateral rating scale, which indicated similar results. She noted mild to moderate impairment across all domains.    Generalized Anxiety Disorder Questionnaire (ROSARIO-7)  This questionnaire is designed to screen for anxiety in adults. Based on the client's score of 10, he is reporting moderate symptoms of anxiety. Client identified the following symptoms of anxiety: feeling on edge/nervous/anxious, worrying about many different things, trouble relaxing, becoming easily annoyed or irritable and restlessness.     Patient Health Questionnaire- 9 (PHQ-9)   This questionnaire is designed to screen for depression in adults. Based on the client's score of 5, he is reporting mild symptoms of depression. Client identified the following symptoms of depression: lack of interest, feeling bad about self, and poor concentration.  "
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
## 2017-05-17 NOTE — MR AVS SNAPSHOT
MRN:[id number]                      After Visit Summary   5/17/2017    Timmy Yousif    MRN: [id number]           Visit Information        Provider Department      5/17/2017 3:00 PM Radha Hurt, PhD [facility] ADHD      MyChart Information     MyChart gives you secure access to your electronic health record. If you see a primary care provider, you can also send messages to your care team and make appointments. If you have questions, please call your primary care clinic.  If you do not have a primary care provider, please call [phone] and they will assist you.        Care EveryWhere ID     This is your Care EveryWhere ID. This could be used by other organizations to access your [place] medical records  [id number]

## 2017-05-17 NOTE — Clinical Note
Manjeet Boyce,  Here is the DA for the ADHD assessment. We are going to continue with testing; I will be sure to route a copy of the final report to you. Please let me know if you have any questions or concerns.  Thank you, Radha Hurt

## 2017-05-24 ENCOUNTER — OFFICE VISIT (OUTPATIENT)
Dept: PSYCHOLOGY | Facility: CLINIC | Age: 37
End: 2017-05-24
Payer: COMMERCIAL

## 2017-05-24 DIAGNOSIS — F33.0 MAJOR DEPRESSIVE DISORDER, RECURRENT EPISODE, MILD (H): Primary | ICD-10-CM

## 2017-05-24 PROCEDURE — 99207 ZZC NO CHARGE LOS: CPT | Performed by: PSYCHOLOGIST

## 2017-05-24 NOTE — MR AVS SNAPSHOT
MRN:[id number]                      After Visit Summary   5/24/2017    Timmy Yousif    MRN: [id number]           Visit Information        Provider Department      5/24/2017 2:00 PM Radha Hurt, PhD [facility] ADHD      MyChart Information     MyChart gives you secure access to your electronic health record. If you see a primary care provider, you can also send messages to your care team and make appointments. If you have questions, please call your primary care clinic.  If you do not have a primary care provider, please call [phone] and they will assist you.        Care EveryWhere ID     This is your Care EveryWhere ID. This could be used by other organizations to access your [place] medical records  [id number]        Equal Access to Services     MARCUS KIRKPATRICK : César Estrada, waaxda marilynadaha, qadianne kaalmabobo ghotra, collette goff. So [facility] [phone].    ATENCIÓN: Si habla español, tiene a logan disposición servicios gratuitos de asistencia lingüística. Llame al [phone].    We comply with applicable federal civil rights laws and [place] laws. We do not discriminate on the basis of race, color, national origin, age, disability sex, sexual orientation or gender identity.

## 2017-05-26 ENCOUNTER — DOCUMENTATION ONLY (OUTPATIENT)
Dept: PSYCHOLOGY | Facility: CLINIC | Age: 37
End: 2017-05-26
Payer: COMMERCIAL

## 2017-05-26 DIAGNOSIS — R69 DIAGNOSIS DEFERRED: Primary | ICD-10-CM

## 2017-05-26 PROCEDURE — 96101 HC PSYCHOLOGICAL TEST BY PSYCHOLOGIST/MD, PER HR: CPT | Performed by: PSYCHOLOGIST

## 2017-05-31 DIAGNOSIS — F33.1 MAJOR DEPRESSIVE DISORDER, RECURRENT EPISODE, MODERATE (H): ICD-10-CM

## 2017-05-31 NOTE — TELEPHONE ENCOUNTER
mirtazapine       Last Written Prescription Date: 04/04/2017  Last Fill Quantity: 30; # refills: 1  Last Office Visit with [facility], [facility] or  Health prescribing provider:  04/12/2017   Next 5 appointments (look out 90 days)     Jun 07, 2017  5:00 PM CDT   Return Visit with Radha Hurt, PhD   [facility] ([facility])    [address] [place] [id number] [phone]                   Last PHQ-9 score on record=   PHQ-9 SCORE 5/10/2017   Total Score 10       Lab Results   Component Value Date    AST 20 02/09/2016     Lab Results   Component Value Date    ALT 31 02/09/2016

## 2017-06-05 RX ORDER — MIRTAZAPINE 30 MG/1
30 TABLET, FILM COATED ORAL AT BEDTIME
Qty: 30 TABLET | Refills: 1 | Status: SHIPPED | OUTPATIENT
Start: 2017-06-05 | End: 2017-06-08

## 2017-06-07 ENCOUNTER — OFFICE VISIT (OUTPATIENT)
Dept: PSYCHOLOGY | Facility: CLINIC | Age: 37
End: 2017-06-07
Payer: COMMERCIAL

## 2017-06-07 ENCOUNTER — DOCUMENTATION ONLY (OUTPATIENT)
Dept: PSYCHOLOGY | Facility: CLINIC | Age: 37
End: 2017-06-07
Payer: COMMERCIAL

## 2017-06-07 DIAGNOSIS — F90.2 ATTENTION DEFICIT HYPERACTIVITY DISORDER (ADHD), COMBINED TYPE: Primary | ICD-10-CM

## 2017-06-07 PROCEDURE — 90832 PSYTX W PT 30 MINUTES: CPT | Performed by: PSYCHOLOGIST

## 2017-06-07 PROCEDURE — 96101 HC PSYCHOLOGICAL TEST BY PSYCHOLOGIST/MD, PER HR: CPT | Performed by: PSYCHOLOGIST

## 2017-06-07 NOTE — PROGRESS NOTES
Client Name: Timmy Yousif Date: 6/7/2017        Service Type: Individual (ADHD Evaluation feedback session)     Session Start Time: 5:00 Session End Time: 5:20      Session Length: 20 minutes      Session #: (feedback)     Attendees: Client attended alone        DATA        Treatment Objective(s) Addressed in This Session:   Provided feedback on ADHD evaluation. Reviewed test results in depth and answered client's questions. Client diagnosed with ADHD, Combined Presentation. Reviewed ADHD symptom management handout. This provider also completed full written report of evaluation, including integration of testing data, summary, and recommendations. Please see Documentation Only dated 6/7/17.     Progress on / Status of Treatment Objective(s) / Homework:   Completed      Intervention:  ADHD Evaluation feedback; Reviewed report (can be found in Documentation Only encounter dated 6/7/17); Client was appreciative of the feedback and expressed understanding of the diagnosis. He expressed intent to follow up with Dr. Carli Vee to discuss medication management.        ASSESSMENT: Current Emotional / Mental Status (status of significant symptoms):  Risk status (Self / Other harm or suicidal ideation)  Client denies current fears or concerns for personal safety.  Client denies current or recent suicidal ideation or behaviors.  Client denies current or recent homicidal ideation or behaviors.  Client denies current or recent self-injurious behavior or ideation.  Client denies other safety concerns.  A safety and risk management plan has not been developed at this time, however client was given the after-hours number / 911 should there be a change in any of these risk factors.     Appearance: Appropriate   Eye Contact: Good   Psychomotor Behavior: Normal   Attitude: Cooperative   Orientation: All  Speech  Rate / Production: Normal   Volume: Normal   Mood: Normal  Affect: Appropriate   Thought Content: Clear   Thought Form:  Coherent Logical   Insight: Good      Medication Review:  Client is prescribed Mirtazapine.     Medication Compliance:  Yes     Changes in Health Issues:  None reported     Chemical Use Review:  Substance Use: Chemical use reviewed, no active concerns identified      Tobacco Use: No current tobacco use.      Collateral Reports Completed:  Routed note to Care Team Member(s)     PLAN: (Homework, other)     Recommendations:       1. Schedule an appointment with your physician to discuss a medication evaluation. Medication can be very helpful in treating the symptoms of ADHD.     2. Access resources through websites, books, and articles such as those provided in the Coping with ADHD handout.    3. Consider working with an ADHD  or individual therapist to learn skills to assist with symptom management, as well as ways to improve relationships, etc., that may have been impacted by your symptoms.    4. Schedule a follow-up appointment with me in about 6 weeks to review symptoms, treatment involvement, and struggles and/or successes.         Radha Hurt, PhD, LP

## 2017-06-07 NOTE — MR AVS SNAPSHOT
MRN:[id number]                      After Visit Summary   6/7/2017    Timmy Yousif    MRN: [id number]           Visit Information        Provider Department      6/7/2017 5:00 PM Radha Hurt, PhD [facility] Generic      Your next 10 appointments already scheduled     Jun 08, 2017  9:15 AM CDT   Return Visit with CARIE Orellana CNS   [facility]    [address] [id number] [phone]              MyChart Information     [facility] gives you secure access to your electronic health record. If you see a primary care provider, you can also send messages to your care team and make appointments. If you have questions, please call your primary care clinic.  If you do not have a primary care provider, please call [phone] and they will assist you.        Care EveryWhere ID     This is your Care EveryWhere ID. This could be used by other organizations to access your [place] medical records  [id number]

## 2017-06-07 NOTE — PROGRESS NOTES
[facility]  ADHD Evaluation    Client Name: Timmy Yousif  MRN: [id number]  : 1980    Date(s) of assessment: Diagnostic Assessment (17, 17), Jeri self-report and collateral measures scored and interpreted (17), MMPI -2 (administered on 17, interpreted on 17)     Information about appointment:  Client attended three sessions to aid in determining client's mental health diagnosis or diagnoses and treatment recommendations that best address client concerns. Available medical records were reviewed. There were no previous psychological evaluations for review. A diagnostic assessment was conducted at the initial appointment. Client completed several rating scales to assist in assessing attention-related and other mental health symptoms that may be causing impairments in functioning. Rating scales were also completed by a collateral contact. Client also completed personality testing to aid in diagnostic clarification.      Assessment tools:   Jeri Adult ADHD Rating Scale-IV: Self and Other Reports (BAARS-IV), Jeri Functional Impairment Scale: Self and Other Reports (BFIS), Jeri Deficits in Executive Functioning Scale: Self and Other Reports (BDEFS), Patient Health Questionnaire-9 (PHQ-9), Generalized Anxiety Disorder-7 (ROSARIO-7) and [place] Multiphasic Personality Inventory (MMPI)  Assessment Results:  Behavioral Observations:  Client arrived to each session on-time. He was pleasant and cooperative at all times. He appeared motivated to do his best on each task. Client did demonstrate significant difficulties with hyperactivity during the sessions. He presented as somewhat anxious and fidgeted frequently. Despite being somewhat anxious, the following results are likely to be an accurate reflection of client's current functioning.     Jeri Adult ADHD Rating Scale-IV: Self and Other Reports (BAARS-IV)  The BAARS-IV assesses for symptoms of ADHD that are  "experienced in one's daily life. This assessment measure includes self and collateral rating scales designed to provide information regarding current and childhood symptoms of ADHD including inattention, hyperactivity, and impulsivity. Self-report scores are reported as percentiles. Scores at the 76th-83rd percentile are considered marginal, scores at the 84th-92nd percentile are considered borderline, scores at the 93rd-95th percentile are considered mild, scores at the 96th-98th percentile are considered moderate, and those at the 99th percentile are considered severe. Collateral or \"other\" rating scales are reported as number of symptoms observed in comparison to those reported by the client. Norms and percentile scores are not available for collateral reports.      Current Symptoms Scale--Self Report:   Client completed the self-report inventory of current symptoms. The results indicate that the client's Total ADHD Score was 61 which places him in the 99th percentile for overall ADHD symptoms. In addition, the client endorsed the following occur \"often\" or \"very often\": 7/9 (98th percentile) Inattention symptoms, 7/9 (98th percentile) Hyperactivity/Impulsivity symptoms, and 4/9 (93rd percentile) Sluggish Cognitive Tempo symptoms. Client indicated that the reported symptoms have resulted in impaired functioning in school, home, and work. Overall, the results suggest the client is reporting moderate symptoms of hyperactivity/impulsivity and inattention at this time.      Current Symptoms Scale--Other Report:  Client's wife completed the collateral report inventory of current symptoms. Based on the collateral contact's observation of symptoms, the client demonstrates the following \"often\" or \"very often\": 6/9 Inattention symptoms, 4/5 Hyperactivity symptoms, 3/4 Impulsivity symptoms, and 4/9 Sluggish Cognitive Tempo symptoms. The client's Total ADHD Score was 54. The collateral contact indicated the client " "demonstrated impaired functioning in work, home and social relationships. The collateral- and self-report scores are similar and suggest that the Client is experiencing symptoms of inattention and hyperactivity/impulsivity at this time.     Childhood Symptoms Scale--Self-Report:  Client completed the self-report inventory of childhood symptoms. The results indicate that the client's Total ADHD Score was 57 which places him in the 98th percentile for overall ADHD symptoms in childhood. In addition, the client endorsed having experienced the following \"often\" or \"very often\": 9/9 (99th percentile) Inattention symptoms and 8/9 (98th percentile) Hyperactivity-Impulsivity symptoms. Client indicated that the reported symptoms resulted in impaired functioning in school and home. Overall, the results suggest the client reported experiencing severe symptoms of inattention and moderate symptoms of hyperactivity/impulsivity as a child.     Childhood Symptoms Scale--Other Report:  Client's mother completed the collateral report inventory of childhood symptoms. Based on the collateral contact's recollection of client's childhood symptoms, the client demonstrated the following \"often\" or \"very often\": 7/9 Inattention symptoms and 8/9 Hyperactivity-Impulsivity symptoms. The client's Total ADHD Score was 56. The collateral contact indicated the client demonstrated impaired functioning in school, home, and social relationships. The collateral- and self-report scores are similar and suggest that the Client experienced/demonstrated symptoms of inattention and hyperactivity/impulsivity as a child.    Jeri Functional Impairment Scale: Self and Other Reports (BFIS)  The BFIS is used to assess an individuals' psychosocial impairment in major life/daily activities that may be due to a mental health disorder. This assessment measure includes self and collateral rating scales. Self-report scores are reported as percentiles. Scores at the " "76th-83rd percentile are considered marginal, scores at the 84th-92nd percentile are considered borderline, scores at the 93rd-95th percentile are considered mild, scores at the 96th-98th percentile are considered moderate, and those at the 99th percentile are considered severe. Collateral or \"other\" rating scales are reported as number of symptoms observed in comparison to those reported by the client. Norms and percentile scores are not available for collateral reports.      Results indicate the client identified impairment (scores at or greater than 93rd percentile) in the following areas: home-family, home-chores, work, community activities, money management, driving, and daily responsibilities. The client's Mean Impairment Score was 5 (89th percentile) indicating the client is reporting borderline impairment in functioning across domains. Client's wife completed the collateral rating scale, which indicated similar results. The collateral contact's scores were generally higher than the client's report (e.g., Mean Impairment Score of 6.54). Client's wife noted impairment in the domains of home-family, home-chores, work, social-friends, community activities, marriage, driving, daily responsibilities, and childrearing.      Jeri Deficits in Executive Functioning Scale (BDEFS)  The BDEFS is a measure used for evaluating dimensions of adult executive functioning in daily life. This assessment measure includes self and collateral rating scales. Self-report scores are reported as percentiles. Scores at the 76th-83rd percentile are considered marginal, scores at the 84th-92nd percentile are considered borderline, scores at the 93rd-95th percentile are considered mild, scores at the 96th-98th percentile are considered moderate, and those at the 99th percentile are considered severe. Collateral or \"other\" rating scales are reported as number of symptoms observed in comparison to those reported by the client. Norms and " percentile scores are not available for collateral reports.      Results indicate the client's Total Executive Functioning Score was 231 (95th percentile). The ADHD-Executive Functioning Index score was 30 (97th percentile). These scores suggest the client has mild to moderate deficits in executive functioning. These deficits may be due to ADHD or another mental health disorder. Results indicate the client identified significant deficits in the following areas: self-management to time (moderate), self-organization/problem solving (moderate), self-restraint (mild), and self-motivation (severe). Client's wife completed the collateral rating scale, which indicated similar results. She noted mild to moderate impairment across all domains.    Summary of [place] Multiphasic Personality Inventory--Second Edition   Client completed the [place] Multiphasic Personality Inventory-2 (MMPI-2), a self-report personality inventory, as part of his evaluation. Validity scales indicate that the client responded in an open and consistent manner, resulting in a valid profile. The following results are likely to be an accurate reflection of Client's current functioning. Client s report on the MMPI-2 did not reflect significant elevations across any clinical scales. Although not at the clinical level, individuals with similar profiles tend to report social passivity and avoidance and loss of self-efficacy and self-confidence. They may lack drive, energy, interest, and motivation and complain of inability to complete normal tasks and duties. They may experience overly busy and inefficient cognitive activity. Individuals with similar profiles report problems and impediments to performance in employment, including tension, worry, fearfulness, and feeling overwhelmed.    Generalized Anxiety Disorder Questionnaire (ROSARIO-7)  This questionnaire is designed to screen for anxiety in adults. Based on the client's score of 10, he is reporting  "moderate symptoms of anxiety. Client identified the following symptoms of anxiety: feeling on edge/nervous/anxious, worrying about many different things, trouble relaxing, becoming easily annoyed or irritable and restlessness.     Patient Health Questionnaire- 9 (PHQ-9)   This questionnaire is designed to screen for depression in adults. Based on the client's score of 5, he is reporting mild symptoms of depression. Client identified the following symptoms of depression: lack of interest, feeling bad about self, and poor concentration.    Summary (based on clinical interview, review of records, test results):  Client is a 36-year-old, ,  male. Client was referred by psychiatrist, Dr. Carli Vee, for a diagnostic assessment/ADHD evaluation. Client's presenting concerns included:  work, trouble finishing and completing tasks, frustration, feeling overwhelmed, short-fuse.  Client stated that his symptoms have resulted in the following functional impairments: home life with wife and three kids and work / vocational responsibilities. He reported that he has difficulty juggling all of the responsibilities of work and home. 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 He stated, \"I don't know what I'd do without him. He's a good partner. He does all of the paperwork and the invoices but I still have to get involved with taxes and other forms.\" Client reported, \"I'm so disorganized and all over the place. I tend to be more involved early on in a project, but I have a hard time finishing things. I'm jumping from one thing to another. I forget appointments and I forget to schedule things. I'm disorganized and don't follow through on some things. There is a lot going on. I feel like my partner is carrying me at times because I leave things unfinished.\" Client reported he loses things and often misplaces tools that he needs for " "work. He reported frequently interrupting others and stated, \"I'll feel impatient because they aren't getting to the point so I'll finish their sentence for them. Or, while they're talking, I'm thinking about something else so I turn the conversation a different way.\" Client explained that he often forgets things that his wife has told him (e.g., when they are planning to get together with extended family), he makes mistakes on things due to inattention, has difficulty sustaining attention, his mind wanders during conversation, he procrastinates and \"waits until the 11th hour to get things done\", and has difficulty organizing tasks. While driving, he sometimes misses turns even when the GPS is guiding him and he reported he has been in a few accidents due to inattention (i.e., \"backing into things\").     He reported the following symptoms of inattention: difficulty paying attention, not listening when spoken to, starts but does not complete tasks, difficulty sustaining attention, poor time management, procrastination, poor organizational skills, losing things, and forgetfulness. He reported the following symptoms of hyperactivity/impulsivity: fidgeting, restlessness, being  on the go  as if driven by a motor, talking excessively, finishing others  sentences, and interrupting others. Client reported that these problems began in childhood but he did not notice them until approximately three years ago. Client reported feeling increasing anxiety which led to feelings of depression. He described feeling overwhelmed and \"all over the place\" and indicated he has been running his own construction company for 6 years. He and his wife have been  for five years and have three young children, ages 4, 2, and 7 months. He described having issues with inattention and disorganization as a child but indicated he did not experience anxiety or depression until the past three years. Client has attempted to resolve these concerns " "in the past through medication. Client reported that other professional(s) are involved in providing support / services. He is prescribed medication for sleep (i.e., Mirtazapine).    Client reported he grew up in [place], [place]. Client was the third born of three children. He reported he has sisters that are \"much older\" (e.g., 11 years older) and reported he was raised as an only child because his sisters were older and out of the house while he was growing up.  This is an intact family and parents remain . Client reported that his childhood was \"good, and fun\".  Client described his childhood family environment as nurturing and stable.  As a child, client reported that he failed to complete assigned chores in the home environment, had problems getting ready for school in the morning, had problems with organization and keeping track of items, misplaced or lost things, forgot school work or other items between home and school, needed frequent reminders by parents to be motivated or to complete work and had problems managing temper with frequent emotional outbursts. Client reported no difficulty with childhood peer relationships but described himself as shy. He reported, \"I was always laughing and goofing off in school. I'd get in trouble for being disruptive or talking too much.\"  Client described his current relationships with family of origin as supportive with frequent communication. Client reported a history of one marriage. Client has been  for 5 years. Client reported having three children; they have daughters age 4, 2, and 7 months.  Client identified some stable and meaningful social connections. Client reported that he has not been involved with the legal system.       Client's highest education level was high school graduate. During the elementary, middle, and high school years, patient recalls academic strengths in the area of \"hands on\" activities and shop class and gym. Client reported " "experiencing academic problems in English/Spelling. Client did identify the following learning problems: attention, concentration and reading. He stated, \"I couldn't do my spelling homework and I remember bringing it home and my mom would help me with it. Otherwise I'd try to work on it at school but I wouldn't be able to finish it and I'd turn it in anyway.\" Client stated, \"I've never read a whole book in my life. I couldn't ever get into it or stay with it. I can't pay attention long enough to read the whole book. I remember reading Luksa-Notes for books in school and cheating on tests.\" Client did receive tutoring services during the school years. He described being \"pulled aside\" with other kids during reading assignments in school, stating, \"They made us sit off to the side and you'd have to write things down while you were reading. They made you do extra steps and more work.\" Client did not receive special education services. Client reported no particular problems during the school years. He reported that he sometimes turned in work that was incomplete or would skip school [place] and hang [place] with his friends. He reported attending \"[employer] school\" which was like alf for skipping class in high school. Client did not attend post-secondary school.      Client did not serve in the . Client reported that he is currently employed. Client reported that the current job is a good fit for his skills and personality.  Client reported that he frequently made mistakes with poor attention to detail, often felt bored, often been late in completing projects, disorganized behavior, distractible behavior, problems learning new materials and poor time management .  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  The longest period of employment has been seven years. Client has not been terminated from a " place of employment.     Results of testing were significant for inattention and hyperactivity/impulsivity symptoms. Rating scales suggested the client is experiencing moderate symptoms of inattention and hyperactivity/impulsivity that have been present since childhood.  Childhood symptoms were also reported on rating scales as Client indicated he experienced severe symptoms of inattention and moderate symptoms of hyperactivity/impulsivity as a child. Client s report during the clinical interview was also suggestive of childhood symptoms. The collateral report was commensurate with Client s self-report and was suggestive of childhood and current symptoms of inattention and hyperactivity/impulsivity. Impairment is reported in more than one setting (work, home, and in relationships). Results of testing also suggested the Client has mild to moderate deficits in executive functioning that are likely to be due to ADHD. Personality testing and self-report measures suggested that Client experiences moderate anxiety and mild depression. However, during the clinical interview, the Client denied experiencing significant symptoms of anxiety or depression at this time. As such, a separate mood disorder diagnosis is not being assigned at this time. Based on the results of clinical interview and psychological testing, the client currently meets criteria for diagnosis of Attention-Deficit/Hyperactivity Disorder, Combined Presentation. Client was provided with the results of testing, diagnosis, and recommendations in his last appointment.      DSM5 Diagnoses: (Sustained by DSM5 Criteria Listed Above)    Diagnoses:     Attention-Deficit/Hyperactivity Disorder, Combined presentation (F90.2)    Psychosocial & Contextual Factors: family stress, child-care stress, owns his own company  WHODAS 2.0 (12 item) Raw Score: 25     Recommendations:    1. Schedule an appointment with your physician to discuss a medication evaluation. Medication  can be very helpful in treating the symptoms of ADHD.     2. Access resources through websites, books, and articles such as those provided in the Coping with ADHD handout.    3. Consider working with an ADHD  or individual therapist to learn skills to assist with symptom management, as well as ways to improve relationships, etc., that may have been impacted by your symptoms.    4. Schedule a follow-up appointment with me in about 6 weeks to review symptoms, treatment involvement, and struggles and/or successes.     Radha Hurt, Ph.D.,   Licensed Psychologist

## 2017-06-07 NOTE — Clinical Note
Manjeet Boyce,  I met with Liban today to provide feedback regarding the ADHD evaluation. He does meet criteria for ADHD, Combined presentation. He expressed intent to follow up with you to discuss medication management. Please let me know if you have any questions or concerns. Thank you for the referral!  Radha Hurt

## 2017-06-08 ENCOUNTER — OFFICE VISIT (OUTPATIENT)
Dept: PSYCHIATRY | Facility: CLINIC | Age: 37
End: 2017-06-08
Payer: COMMERCIAL

## 2017-06-08 VITALS
RESPIRATION RATE: 16 BRPM | TEMPERATURE: 97.8 F | HEART RATE: 74 BPM | BODY MASS INDEX: 25.18 KG/M2 | WEIGHT: 196.19 LBS | HEIGHT: 74 IN | DIASTOLIC BLOOD PRESSURE: 69 MMHG | SYSTOLIC BLOOD PRESSURE: 136 MMHG

## 2017-06-08 DIAGNOSIS — F90.2 ATTENTION DEFICIT HYPERACTIVITY DISORDER (ADHD), COMBINED TYPE: Primary | ICD-10-CM

## 2017-06-08 DIAGNOSIS — F33.1 MAJOR DEPRESSIVE DISORDER, RECURRENT EPISODE, MODERATE (H): ICD-10-CM

## 2017-06-08 DIAGNOSIS — F41.1 GAD (GENERALIZED ANXIETY DISORDER): ICD-10-CM

## 2017-06-08 PROCEDURE — 99214 OFFICE O/P EST MOD 30 MIN: CPT | Performed by: CLINICAL NURSE SPECIALIST

## 2017-06-08 RX ORDER — MIRTAZAPINE 30 MG/1
30 TABLET, FILM COATED ORAL AT BEDTIME
Qty: 30 TABLET | Refills: 3 | Status: SHIPPED | OUTPATIENT
Start: 2017-06-08 | End: 2017-12-01

## 2017-06-08 RX ORDER — DEXTROAMPHETAMINE SACCHARATE, AMPHETAMINE ASPARTATE MONOHYDRATE, DEXTROAMPHETAMINE SULFATE AND AMPHETAMINE SULFATE 5; 5; 5; 5 MG/1; MG/1; MG/1; MG/1
20 CAPSULE, EXTENDED RELEASE ORAL EVERY MORNING
Qty: 30 CAPSULE | Refills: 0 | Status: SHIPPED | OUTPATIENT
Start: 2017-06-08 | End: 2017-06-30 | Stop reason: DRUGHIGH

## 2017-06-08 RX ORDER — LISDEXAMFETAMINE DIMESYLATE 30 MG/1
30 CAPSULE ORAL EVERY MORNING
Qty: 30 CAPSULE | Refills: 0 | Status: SHIPPED | OUTPATIENT
Start: 2017-06-08 | End: 2017-06-08

## 2017-06-08 ASSESSMENT — ANXIETY QUESTIONNAIRES
6. BECOMING EASILY ANNOYED OR IRRITABLE: MORE THAN HALF THE DAYS
7. FEELING AFRAID AS IF SOMETHING AWFUL MIGHT HAPPEN: NOT AT ALL
2. NOT BEING ABLE TO STOP OR CONTROL WORRYING: SEVERAL DAYS
GAD7 TOTAL SCORE: 9
5. BEING SO RESTLESS THAT IT IS HARD TO SIT STILL: MORE THAN HALF THE DAYS
1. FEELING NERVOUS, ANXIOUS, OR ON EDGE: SEVERAL DAYS
3. WORRYING TOO MUCH ABOUT DIFFERENT THINGS: SEVERAL DAYS
IF YOU CHECKED OFF ANY PROBLEMS ON THIS QUESTIONNAIRE, HOW DIFFICULT HAVE THESE PROBLEMS MADE IT FOR YOU TO DO YOUR WORK, TAKE CARE OF THINGS AT HOME, OR GET ALONG WITH OTHER PEOPLE: SOMEWHAT DIFFICULT

## 2017-06-08 ASSESSMENT — PATIENT HEALTH QUESTIONNAIRE - PHQ9: 5. POOR APPETITE OR OVEREATING: MORE THAN HALF THE DAYS

## 2017-06-08 NOTE — PROGRESS NOTES
"      Psychiatric Progress Note    Name: Timmy Yousif  Date: 6/8/2017  Length of Visit: 30 minutes  MRN: [id number]      Current Outpatient Prescriptions   Medication Sig     mirtazapine (REMERON) 30 MG tablet Take 1 tablet (30 mg) by mouth At Bedtime     lisinopril (PRINIVIL,ZESTRIL) 10 MG tablet Take 1 tablet (10 mg) by mouth daily     oxyCODONE-acetaminophen (PERCOCET) 5-325 MG per tablet Take 1-2 tablets by mouth every 4 hours as needed for moderate to severe pain     cyclobenzaprine (FLEXERIL) 10 MG tablet Take 1 tablet (10 mg) by mouth 3 times daily as needed for muscle spasms     No current facility-administered medications for this visit.        Therapist:  None    PHQ-9:  PHQ-9 score:    PHQ-9 SCORE 6/8/2017   Total Score 5       ROSARIO-7:  ROSARIO-7 SCORE 4/12/2017 5/10/2017 6/8/2017   Total Score 14 11 9           Interim History:  Patient returns for follow up from appointment 4-. Mirtazapine (Remeron) 30 mg at bedtime was continued and patient was referred for ADHD testing. Patient was evaluated by Radha Hurt, PhD for ADHD on 5- and was diagnosed with Attention-Deficit/Hyperactivity Disorder, Combined presentation. Full report is available in [facility].     Today, patient is accompanied by his wife, Sugar. Patient reports he has been sleeping well and his appetite has improved taking the mirtazapine (Remeron). Patient denies side effects. We discussed at length the [employer] testing results (scanned to Epic) and options for treating ADHD. After discussing risks and benefits of medications most readily metabolized per [employer] testing, patient would like to proceed with lisdexamfetamine (Vyvanse).       Past Medical History:   Diagnosis Date     Arthritis December 2009    Back     Benign essential hypertension        10 point ROS is negative except for those listed above.     Vital Signs:   /69  Pulse 74  Temp 97.8  F (36.6  C) (Tympanic)  Resp 16  Ht 6' 2.25\" (1.886 m)  Wt 196 lb " "3 oz (89 kg)  BMI 25.02 kg/m2      Mental Status Assessment:  Appearance:  Well groomed      Behavior/relationship to examiner/demeanor:  Cooperative, engaged and pleasant  Motor activity:  Normal  Gait:  Normal   Speech:  Normal in volume, articulation, coherence   Mood (subjective report):  \"Good\"  Affect (objective appearance):  Mood congruent  Thought Process (Associations):  Logical, linear and goal directed  Thought content:  No evidence of suicidal or homicidal ideation,          no overt psychosis and                    patient does not appear to be responding to internal stimuli  Oriented to person, place, date/time   Attention Span and concentration: Intact   Memory:  Short-term memory intact and Long-term memory; Intact  Language:  Fluent   Fund of Knowledge/Intelligence:  Average  Use of language: Intact   Abstraction:  Normal  Insight:  Adequate  Judgment:  Adequate for safety    DSM DIAGNOSIS:  Attention-Deficit/Hyperactivity Disorder, Combined presentation (F90.2)  296.22 Major Depressive Disorder, Single Episode, Moderate _ and With anxious distress  300.01 (F41.0) Panic Disorder  300.02 (F41.1) Generalized Anxiety Disorder    Assessment:  Patient's sleep and appetite have improved taking mirtazapine (Remeron). Patient was diagnosed, through testing, with ADHD Combined Presentation and will start lisdexamfetamine (Vyvanse) to target symptoms. 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     Medication side effects and alternatives were reviewed.     Treatment Plan:  Begin lisdexamfetamine (Vyvanse) 30 mg daily.     Continue mirtazapine (Remeron) 30 mg at bedtime.     Follow up in 3-4 weeks.     - Recommend patient discuss medications with their pharmacist. Risks and benefits for medications were discussed including, but not limited to, side effects.   - Safety " plan was reviewed; to the ER as needed or call after hours crisis line; [phone]  - Education and counseling was done regarding use of medications, psychotherapy options  - Call [phone] for appointment or to speak to a nurse.    -Office hours: Monday through Thursday 8:00 am to 4:30 pm; Friday 8:00 am to Noon.   - Patient received a copy of this Treatment Plan today.    Patient will continue to be seen for ongoing consultation and stabilization.      Signed:  Carli Vee, RN, MS, CNS-BC

## 2017-06-08 NOTE — PATIENT INSTRUCTIONS
Treatment Plan:  Begin lisdexamfetamine (Vyvanse) 30 mg daily.     Continue mirtazapine (Remeron) 30 mg at bedtime.     Follow up in 3-4 weeks.     - Recommend patient discuss medications with their pharmacist. Risks and benefits for medications were discussed including, but not limited to, side effects.   - Safety plan was reviewed; to the ER as needed or call after hours crisis line; [phone]  - Education and counseling was done regarding use of medications, psychotherapy options  - Call [phone] for appointment or to speak to a nurse.    -Office hours: Monday through Thursday 8:00 am to 4:30 pm; Friday 8:00 am to Noon.   - Patient received a copy of this Treatment Plan today.

## 2017-06-08 NOTE — MR AVS SNAPSHOT
After Visit Summary   6/8/2017    Timmy Yousif    MRN: [id number]           Patient Information     Date Of Birth          1980        Visit Information        Provider Department      6/8/2017 9:15 AM Carli Vee APRN [facility]        Today's Diagnoses     Attention deficit hyperactivity disorder (ADHD), combined type    -  1    Major depressive disorder, recurrent episode, moderate (H)          Care Instructions    Treatment Plan:  Begin lisdexamfetamine (Vyvanse) 30 mg daily.     Continue mirtazapine (Remeron) 30 mg at bedtime.     Follow up in 3-4 weeks.     - Recommend patient discuss medications with their pharmacist. Risks and benefits for medications were discussed including, but not limited to, side effects.   - Safety plan was reviewed; to the ER as needed or call after hours crisis line; [phone]  - Education and counseling was done regarding use of medications, psychotherapy options  - Call [phone] for appointment or to speak to a nurse.    -Office hours: Monday through Thursday 8:00 am to 4:30 pm; Friday 8:00 am to Noon.   - Patient received a copy of this Treatment Plan today.          Follow-ups after your visit        Who to contact     If you have questions or need follow up information about today's clinic visit or your schedule please contact [facility] directly at [phone].  Normal or non-critical lab and imaging results will be communicated to you by MyChart, letter or phone within 4 business days after the clinic has received the results. If you do not hear from us within 7 days, please contact the clinic through MyChart or phone. If you have a critical or abnormal lab result, we will notify you by phone as soon as possible.  Submit refill requests through [email or web address] or call your pharmacy and they will forward the refill request to us. Please allow 3 business days for your refill to be completed.          Additional  "Information About Your Visit        MyChart Information     [email or web address] gives you secure access to your electronic health record. If you see a primary care provider, you can also send messages to your care team and make appointments. If you have questions, please call your primary care clinic.  If you do not have a primary care provider, please call [phone] and they will assist you.        Care EveryWhere ID     This is your Care EveryWhere ID. This could be used by other organizations to access your [facility] medical records  [id number]        Your Vitals Were     Pulse Temperature Respirations Height BMI (Body Mass Index)       74 97.8  F (36.6  C) (Tympanic) 16 6' 2.25\" (1.886 m) 25.02 kg/m2        Blood Pressure from Last 3 Encounters:   06/08/17 136/69   04/12/17 132/82   03/21/17 130/81    Weight from Last 3 Encounters:   06/08/17 196 lb 3 oz (89 kg)   04/12/17 196 lb (88.9 kg)   03/21/17 186 lb 9.6 oz (84.6 kg)              Today, you had the following     No orders found for display         Today's Medication Changes          These changes are accurate as of: 6/8/17 10:08 AM.  If you have any questions, ask your nurse or doctor.               Start taking these medicines.        Dose/Directions    lisdexamfetamine 30 MG capsule   Commonly known as:  VYVANSE   Used for:  Attention deficit hyperactivity disorder (ADHD), combined type        Dose:  30 mg   Take 1 capsule (30 mg) by mouth every morning   Quantity:  30 capsule   Refills:  0            Where to get your medicines      These medications were sent to [facility] [place], [place] - [address]  [address] [address]     Phone:  [phone]     mirtazapine 30 MG tablet         Some of these will need a paper prescription and others can be bought over the counter.  Ask your nurse if you have questions.     Bring a paper prescription for each of these medications     lisdexamfetamine 30 MG capsule                " Primary Care Provider Office Phone # Fax #    Jair Huffman PA-C [phone]       [address] [address]        Thank you!     Thank you for choosing [facility]  for your care. Our goal is always to provide you with excellent care. Hearing back from our patients is one way we can continue to improve our services. Please take a few minutes to complete the written survey that you may receive in the mail after your visit with us. Thank you!             Your Updated Medication List - Protect others around you: Learn how to safely use, store and throw away your medicines at www.disposemymeds.org.          This list is accurate as of: 6/8/17 10:08 AM.  Always use your most recent med list.                   Brand Name Dispense Instructions for use    cyclobenzaprine 10 MG tablet    FLEXERIL    90 tablet    Take 1 tablet (10 mg) by mouth 3 times daily as needed for muscle spasms       lisdexamfetamine 30 MG capsule    VYVANSE    30 capsule    Take 1 capsule (30 mg) by mouth every morning       lisinopril 10 MG tablet    PRINIVIL/ZESTRIL    90 tablet    Take 1 tablet (10 mg) by mouth daily       mirtazapine 30 MG tablet    REMERON    30 tablet    Take 1 tablet (30 mg) by mouth At Bedtime       oxyCODONE-acetaminophen 5-325 MG per tablet    PERCOCET    20 tablet    Take 1-2 tablets by mouth every 4 hours as needed for moderate to severe pain

## 2017-06-09 ASSESSMENT — PATIENT HEALTH QUESTIONNAIRE - PHQ9: SUM OF ALL RESPONSES TO PHQ QUESTIONS 1-9: 5

## 2017-06-09 ASSESSMENT — ANXIETY QUESTIONNAIRES: GAD7 TOTAL SCORE: 9

## 2017-06-12 ENCOUNTER — MYC MEDICAL ADVICE (OUTPATIENT)
Dept: PSYCHIATRY | Facility: CLINIC | Age: 37
End: 2017-06-12

## 2017-06-14 NOTE — TELEPHONE ENCOUNTER
Patient has likely noticed immediate improvement, but it can take a couple of weeks to see maximum therapeutic benefit. Please ask that he give at least one more week. Thanks.

## 2017-06-14 NOTE — TELEPHONE ENCOUNTER
Chart reviewed. Will route request for dose increase to provider. He started Vyvanse 30 mg on 6/8/17.    From 6/8/2017:  DSM DIAGNOSIS:  Attention-Deficit/Hyperactivity Disorder, Combined presentation (F90.2)  296.22 Major Depressive Disorder, Single Episode, Moderate _ and With anxious distress  300.01 (F41.0) Panic Disorder  300.02 (F41.1) Generalized Anxiety Disorder     Assessment:  Patient's sleep and appetite have improved taking mirtazapine (Remeron). Patient was diagnosed, through testing, with ADHD Combined Presentation and will start lisdexamfetamine (Vyvanse) to target symptoms. 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      Medication side effects and alternatives were reviewed.      Treatment Plan:  Begin lisdexamfetamine (Vyvanse) 30 mg daily.      Continue mirtazapine (Remeron) 30 mg at bedtime.      Follow up in 3-4 weeks.      - Recommend patient discuss medications with their pharmacist. Risks and benefits for medications were discussed including, but not limited to, side effects.   - Safety plan was reviewed; to the ER as needed or call after hours crisis line; [phone]  - Education and counseling was done regarding use of medications, psychotherapy options  - Call [phone] for appointment or to speak to a nurse.    -Office hours: Monday through Thursday 8:00 am to 4:30 pm; Friday 8:00 am to Noon.   - Patient received a copy of this Treatment Plan today.     Patient will continue to be seen for ongoing consultation and stabilization.        Signed:  Carli Vee, RN, MS, CNS-BC

## 2017-06-21 ENCOUNTER — MYC MEDICAL ADVICE (OUTPATIENT)
Dept: PSYCHIATRY | Facility: CLINIC | Age: 37
End: 2017-06-21

## 2017-06-23 NOTE — TELEPHONE ENCOUNTER
Reviewed chart. Pt was last seen 6/8/17 and was to return in 3-4 weeks. Requested Pt call for an appointment.

## 2017-06-30 ENCOUNTER — OFFICE VISIT (OUTPATIENT)
Dept: PSYCHIATRY | Facility: CLINIC | Age: 37
End: 2017-06-30
Payer: COMMERCIAL

## 2017-06-30 VITALS
HEART RATE: 78 BPM | SYSTOLIC BLOOD PRESSURE: 135 MMHG | DIASTOLIC BLOOD PRESSURE: 97 MMHG | RESPIRATION RATE: 16 BRPM | WEIGHT: 190 LBS | BODY MASS INDEX: 24.23 KG/M2

## 2017-06-30 DIAGNOSIS — F33.1 MAJOR DEPRESSIVE DISORDER, RECURRENT EPISODE, MODERATE (H): ICD-10-CM

## 2017-06-30 DIAGNOSIS — F41.1 GAD (GENERALIZED ANXIETY DISORDER): ICD-10-CM

## 2017-06-30 DIAGNOSIS — F90.2 ATTENTION DEFICIT HYPERACTIVITY DISORDER (ADHD), COMBINED TYPE: Primary | ICD-10-CM

## 2017-06-30 PROCEDURE — 99214 OFFICE O/P EST MOD 30 MIN: CPT | Performed by: CLINICAL NURSE SPECIALIST

## 2017-06-30 RX ORDER — DEXTROAMPHETAMINE SACCHARATE, AMPHETAMINE ASPARTATE MONOHYDRATE, DEXTROAMPHETAMINE SULFATE AND AMPHETAMINE SULFATE 7.5; 7.5; 7.5; 7.5 MG/1; MG/1; MG/1; MG/1
30 CAPSULE, EXTENDED RELEASE ORAL DAILY
Qty: 30 CAPSULE | Refills: 0 | Status: SHIPPED | OUTPATIENT
Start: 2017-06-30 | End: 2017-07-28

## 2017-06-30 RX ORDER — DEXTROAMPHETAMINE SACCHARATE, AMPHETAMINE ASPARTATE, DEXTROAMPHETAMINE SULFATE AND AMPHETAMINE SULFATE 5; 5; 5; 5 MG/1; MG/1; MG/1; MG/1
20 TABLET ORAL DAILY
Qty: 30 TABLET | Refills: 0 | Status: SHIPPED | OUTPATIENT
Start: 2017-06-30 | End: 2017-07-28

## 2017-06-30 ASSESSMENT — ANXIETY QUESTIONNAIRES
6. BECOMING EASILY ANNOYED OR IRRITABLE: SEVERAL DAYS
3. WORRYING TOO MUCH ABOUT DIFFERENT THINGS: NOT AT ALL
7. FEELING AFRAID AS IF SOMETHING AWFUL MIGHT HAPPEN: NOT AT ALL
5. BEING SO RESTLESS THAT IT IS HARD TO SIT STILL: NOT AT ALL
2. NOT BEING ABLE TO STOP OR CONTROL WORRYING: SEVERAL DAYS
GAD7 TOTAL SCORE: 2
IF YOU CHECKED OFF ANY PROBLEMS ON THIS QUESTIONNAIRE, HOW DIFFICULT HAVE THESE PROBLEMS MADE IT FOR YOU TO DO YOUR WORK, TAKE CARE OF THINGS AT HOME, OR GET ALONG WITH OTHER PEOPLE: SOMEWHAT DIFFICULT
1. FEELING NERVOUS, ANXIOUS, OR ON EDGE: NOT AT ALL

## 2017-06-30 ASSESSMENT — PATIENT HEALTH QUESTIONNAIRE - PHQ9: 5. POOR APPETITE OR OVEREATING: NOT AT ALL

## 2017-06-30 NOTE — MR AVS SNAPSHOT
After Visit Summary   6/30/2017    Timmy Yousif    MRN: [id number]           Patient Information     Date Of Birth          1980        Visit Information        Provider Department      6/30/2017 9:15 AM Carli Vee APRN [facility]        Today's Diagnoses     Attention deficit hyperactivity disorder (ADHD), combined type    -  1      Care Instructions    Treatment Plan:  Increase to Amphetamine (Adderall) XR 30 mg in the morning.     Add amphetamine (Adderall) IR 20 mg in the afternoon.     Continue mirtazapine (Remeron) 30 mg at bedtime.     Follow up in 1-2 months.     - Recommend patient discuss medications with their pharmacist. Risks and benefits for medications were discussed including, but not limited to, side effects.   - Safety plan was reviewed; to the ER as needed or call after hours crisis line; [phone]  - Education and counseling was done regarding use of medications, psychotherapy options  - Call [phone] for appointment or to speak to a nurse.    -Office hours: Monday through Thursday 8:00 am to 4:30 pm; Friday 8:00 am to Noon.   - Patient received a copy of this Treatment Plan today.            Follow-ups after your visit        Who to contact     If you have questions or need follow up information about today's clinic visit or your schedule please contact [facility] directly at [phone].  Normal or non-critical lab and imaging results will be communicated to you by MyChart, letter or phone within 4 business days after the clinic has received the results. If you do not hear from us within 7 days, please contact the clinic through MyChart or phone. If you have a critical or abnormal lab result, we will notify you by phone as soon as possible.  Submit refill requests through [employer] or call your pharmacy and they will forward the refill request to us. Please allow 3 business days for your refill to be completed.           Additional Information About Your Visit        [employer] Information     [employer] gives you secure access to your electronic health record. If you see a primary care provider, you can also send messages to your care team and make appointments. If you have questions, please call your primary care clinic.  If you do not have a primary care provider, please call [phone] and they will assist you.        Care EveryWhere ID     This is your Care EveryWhere ID. This could be used by other organizations to access your [facility] medical records  [id number]        Your Vitals Were     Pulse Respirations BMI (Body Mass Index)             78 16 24.23 kg/m2          Blood Pressure from Last 3 Encounters:   06/30/17 (!) 135/97   06/08/17 136/69   04/12/17 132/82    Weight from Last 3 Encounters:   06/30/17 190 lb (86.2 kg)   06/08/17 196 lb 3 oz (89 kg)   04/12/17 196 lb (88.9 kg)              Today, you had the following     No orders found for display         Today's Medication Changes          These changes are accurate as of: 6/30/17  9:26 AM.  If you have any questions, ask your nurse or doctor.               Start taking these medicines.        Dose/Directions    * amphetamine-dextroamphetamine 30 MG per 24 hr capsule   Commonly known as:  ADDERALL XR   Used for:  Attention deficit hyperactivity disorder (ADHD), combined type   Replaces:  amphetamine-dextroamphetamine 20 MG per 24 hr capsule   Started by:  Carli Vee APRN CNS        Dose:  30 mg   Take 1 capsule (30 mg) by mouth daily   Quantity:  30 capsule   Refills:  0       * amphetamine-dextroamphetamine 20 MG per tablet   Commonly known as:  ADDERALL   Used for:  Attention deficit hyperactivity disorder (ADHD), combined type   Started by:  Carli Vee APRN CNS        Dose:  20 mg   Take 1 tablet (20 mg) by mouth daily   Quantity:  30 tablet   Refills:  0       * Notice:  This list has 2 medication(s) that are the same as other  medications prescribed for you. Read the directions carefully, and ask your doctor or other care provider to review them with you.      Stop taking these medicines if you haven't already. Please contact your care team if you have questions.     amphetamine-dextroamphetamine 20 MG per 24 hr capsule   Commonly known as:  ADDERALL XR   Replaced by:  amphetamine-dextroamphetamine 30 MG per 24 hr capsule   Stopped by:  Carli Vee APRN CNS                Where to get your medicines      Some of these will need a paper prescription and others can be bought over the counter.  Ask your nurse if you have questions.     Bring a paper prescription for each of these medications     amphetamine-dextroamphetamine 20 MG per tablet    amphetamine-dextroamphetamine 30 MG per 24 hr capsule                Primary Care Provider Office Phone # Fax #    Jair Huffman PA-C [phone]       [address] [address]        Equal Access to Services     [facility]. [facility]remont HospitalASHWINI : Hadii aad ku hadasho Soomaali, waaxda luqadaha, qaybta kaalmada adeegyada, waxay jaylyn hayrandy lorenzo . So [facility] [phone].    ATENCIÓN: Si habla español, tiene a logan disposición servicios gratuitos de asistencia lingüística. Kyra al [phone].    We comply with applicable federal civil rights laws and [place] laws. We do not discriminate on the basis of race, color, national origin, age, disability sex, sexual orientation or gender identity.            Thank you!     Thank you for choosing [facility]  for your care. Our goal is always to provide you with excellent care. Hearing back from our patients is one way we can continue to improve our services. Please take a few minutes to complete the written survey that you may receive in the mail after your visit with us. Thank you!             Your Updated Medication List - Protect others around you: Learn how to safely use, store  and throw away your medicines at www.disposemymeds.org.          This list is accurate as of: 6/30/17  9:26 AM.  Always use your most recent med list.                   Brand Name Dispense Instructions for use Diagnosis    * amphetamine-dextroamphetamine 30 MG per 24 hr capsule    ADDERALL XR    30 capsule    Take 1 capsule (30 mg) by mouth daily    Attention deficit hyperactivity disorder (ADHD), combined type       * amphetamine-dextroamphetamine 20 MG per tablet    ADDERALL    30 tablet    Take 1 tablet (20 mg) by mouth daily    Attention deficit hyperactivity disorder (ADHD), combined type       cyclobenzaprine 10 MG tablet    FLEXERIL    90 tablet    Take 1 tablet (10 mg) by mouth 3 times daily as needed for muscle spasms    Acute bilateral low back pain with left-sided sciatica       lisinopril 10 MG tablet    PRINIVIL/ZESTRIL    90 tablet    Take 1 tablet (10 mg) by mouth daily    Benign essential hypertension       mirtazapine 30 MG tablet    REMERON    30 tablet    Take 1 tablet (30 mg) by mouth At Bedtime    Major depressive disorder, recurrent episode, moderate (H)       oxyCODONE-acetaminophen 5-325 MG per tablet    PERCOCET    20 tablet    Take 1-2 tablets by mouth every 4 hours as needed for moderate to severe pain        * Notice:  This list has 2 medication(s) that are the same as other medications prescribed for you. Read the directions carefully, and ask your doctor or other care provider to review them with you.

## 2017-06-30 NOTE — NURSING NOTE
"Chief Complaint   Patient presents with     Recheck Medication     Would like to discuss change in medication       Initial Wt 190 lb (86.2 kg)  BMI 24.23 kg/m2 Estimated body mass index is 24.23 kg/(m^2) as calculated from the following:    Height as of 6/8/17: 6' 2.25\" (1.886 m).    Weight as of this encounter: 190 lb (86.2 kg).  Medication Reconciliation: complete    "

## 2017-06-30 NOTE — PROGRESS NOTES
"      Psychiatric Progress Note    Name: Timmy Yousif  Date: 6/30/2017  Length of Visit: 30 minutes  MRN: [id number]      Current Outpatient Prescriptions   Medication Sig     mirtazapine (REMERON) 30 MG tablet Take 1 tablet (30 mg) by mouth At Bedtime     amphetamine-dextroamphetamine (ADDERALL XR) 20 MG per 24 hr capsule Take 1 capsule (20 mg) by mouth every morning     oxyCODONE-acetaminophen (PERCOCET) 5-325 MG per tablet Take 1-2 tablets by mouth every 4 hours as needed for moderate to severe pain     cyclobenzaprine (FLEXERIL) 10 MG tablet Take 1 tablet (10 mg) by mouth 3 times daily as needed for muscle spasms     lisinopril (PRINIVIL,ZESTRIL) 10 MG tablet Take 1 tablet (10 mg) by mouth daily     No current facility-administered medications for this visit.        Therapist:  None    PHQ-9:  PHQ-9 score:    PHQ-9 SCORE 6/8/2017   Total Score 5       ROSARIO-7:  ROSARIO-7 SCORE 4/12/2017 5/10/2017 6/8/2017   Total Score 14 11 9           Interim History:  Patient returns for follow up from appointment 6-8-2017. Lisdexamfetamine (Vyvanse) was prescribed; however, the co payment was too much. Amphetamine (Adderall) XR 20 mg daily was prescribed. Mirtazapine (Remeron) 30 mg at bedtime was continued. Patient phoned in reporting the amphetamine (Adderall) seemed to wear off by the afternoon.     Today, patient first reports they purchased a mini van and jokingly states he is \"having a mid life crisis\" related to that. Patient reports the amphetamine (Adderall) is somewhat helpful - better able to concentrate and organized, but \"it could be better\". Patient takes first dose around 6 am. Patient reports around 1 pm \"I crash\".     Past Medical History:   Diagnosis Date     Arthritis December 2009    Back     Benign essential hypertension        10 point ROS is negative except for those listed above.     Vital Signs:   Wt 190 lb (86.2 kg)  BMI 24.23 kg/m2      Mental Status Assessment:  Appearance:  Well groomed    " "  Behavior/relationship to examiner/demeanor:  Cooperative, engaged and pleasant  Motor activity:  Normal  Gait:  Normal   Speech:  Normal in volume, articulation, coherence   Mood (subjective report):  \"I'm good\"  Affect (objective appearance):  Mood congruent  Thought Process (Associations):  Logical, linear and goal directed  Thought content:  No evidence of suicidal or homicidal ideation,          no overt psychosis and                    patient does not appear to be responding to internal stimuli  Oriented to person, place, date/time   Attention Span and concentration: Intact   Memory:  Short-term memory intact and Long-term memory; Intact  Language:  Fluent   Fund of Knowledge/Intelligence:  Average  Use of language: Intact   Abstraction:  Normal  Insight:  Adequate  Judgment:  Adequate for safety    DSM DIAGNOSIS:  Attention-Deficit/Hyperactivity Disorder, Combined presentation (F90.2)  296.22 Major Depressive Disorder, Single Episode, Moderate _ and With anxious distress  300.01 (F41.0) Panic Disorder  300.02 (F41.1) Generalized Anxiety Disorder    Assessment:  Patient has noticed some improvement in ADHD symptoms, need increase.     Medication side effects and alternatives were reviewed.     Treatment Plan:  Increase to Amphetamine (Adderall) XR 30 mg in the morning.     Add amphetamine (Adderall) IR 20 mg in the afternoon.     Continue mirtazapine (Remeron) 30 mg at bedtime.     Follow up in 1-2 months.     - Recommend patient discuss medications with their pharmacist. Risks and benefits for medications were discussed including, but not limited to, side effects.   - Safety plan was reviewed; to the ER as needed or call after hours crisis line; [phone]  - Education and counseling was done regarding use of medications, psychotherapy options  - Call [phone] for appointment or to speak to a nurse.    -Office hours: Monday through Thursday 8:00 am to 4:30 pm; Friday 8:00 am to Noon.   - Patient " received a copy of this Treatment Plan today.    Patient will continue to be seen for ongoing consultation and stabilization.      Signed:  Carli Vee RN, MS, CNS-BC

## 2017-06-30 NOTE — PATIENT INSTRUCTIONS
Treatment Plan:  Increase to Amphetamine (Adderall) XR 30 mg in the morning.     Add amphetamine (Adderall) IR 20 mg in the afternoon.     Continue mirtazapine (Remeron) 30 mg at bedtime.     Follow up in 1-2 months.     - Recommend patient discuss medications with their pharmacist. Risks and benefits for medications were discussed including, but not limited to, side effects.   - Safety plan was reviewed; to the ER as needed or call after hours crisis line; [phone]  - Education and counseling was done regarding use of medications, psychotherapy options  - Call [phone] for appointment or to speak to a nurse.    -Office hours: Monday through Thursday 8:00 am to 4:30 pm; Friday 8:00 am to Noon.   - Patient received a copy of this Treatment Plan today.

## 2017-07-01 ASSESSMENT — ANXIETY QUESTIONNAIRES: GAD7 TOTAL SCORE: 2

## 2017-07-01 ASSESSMENT — PATIENT HEALTH QUESTIONNAIRE - PHQ9: SUM OF ALL RESPONSES TO PHQ QUESTIONS 1-9: 3

## 2017-07-17 NOTE — PROGRESS NOTES
Client arrived to take the MMPI.  There were no reported difficulties with taking the test.  Client will receive results of the testing once the evaluation is completed.

## 2017-07-18 ENCOUNTER — MYC MEDICAL ADVICE (OUTPATIENT)
Dept: PSYCHIATRY | Facility: CLINIC | Age: 37
End: 2017-07-18

## 2017-07-20 ENCOUNTER — MYC MEDICAL ADVICE (OUTPATIENT)
Dept: PSYCHIATRY | Facility: CLINIC | Age: 37
End: 2017-07-20

## 2017-07-20 NOTE — TELEPHONE ENCOUNTER
Continuation from previous encounter.    July 19, 2017   Cass Cameron, RN   to Timmy Yousif           10:51 AM   Michela Warner,     Thank you for the feedback.   Please discuss dose changes at your next appointment. Call to schedule [phone].     Thanks,   Cass Cameron RN-BC   Nurse Liaison   [facility]   [phone]            Last read by Timmy Yousif at 3:03 PM on 7/20/2017.    July 18, 2017   Timmy Yousif   to Carli Vee, APRN CNS           1:14 PM   Carli, I'm having much better results with the added afternoon dose of Adderal but do you think we could adjust the morning dosage slightly so I have similar results as the afternoon? Then I think we will have this figured out and thanks for your time.   Liban [phone]

## 2017-07-20 NOTE — TELEPHONE ENCOUNTER
It looks like he may be having better results with the IR formula versus the XR formula, so it's not just a matter of adjusting dose, we would need to discuss the formulation of the medication. Please ask that he schedule. Thanks.

## 2017-07-28 ENCOUNTER — OFFICE VISIT (OUTPATIENT)
Dept: PSYCHIATRY | Facility: CLINIC | Age: 37
End: 2017-07-28
Payer: COMMERCIAL

## 2017-07-28 VITALS
RESPIRATION RATE: 16 BRPM | SYSTOLIC BLOOD PRESSURE: 151 MMHG | BODY MASS INDEX: 24.13 KG/M2 | WEIGHT: 189.2 LBS | DIASTOLIC BLOOD PRESSURE: 89 MMHG | HEART RATE: 96 BPM

## 2017-07-28 DIAGNOSIS — I10 BENIGN ESSENTIAL HYPERTENSION: ICD-10-CM

## 2017-07-28 DIAGNOSIS — F90.2 ATTENTION DEFICIT HYPERACTIVITY DISORDER (ADHD), COMBINED TYPE: ICD-10-CM

## 2017-07-28 PROCEDURE — 99214 OFFICE O/P EST MOD 30 MIN: CPT | Performed by: CLINICAL NURSE SPECIALIST

## 2017-07-28 RX ORDER — LISINOPRIL 10 MG/1
10 TABLET ORAL DAILY
Qty: 90 TABLET | Refills: 3 | Status: CANCELLED | OUTPATIENT
Start: 2017-07-28

## 2017-07-28 RX ORDER — DEXTROAMPHETAMINE SACCHARATE, AMPHETAMINE ASPARTATE, DEXTROAMPHETAMINE SULFATE AND AMPHETAMINE SULFATE 5; 5; 5; 5 MG/1; MG/1; MG/1; MG/1
20 TABLET ORAL 2 TIMES DAILY
Qty: 60 TABLET | Refills: 0 | Status: SHIPPED | OUTPATIENT
Start: 2017-07-28 | End: 2017-08-22

## 2017-07-28 RX ORDER — DEXTROAMPHETAMINE SACCHARATE, AMPHETAMINE ASPARTATE MONOHYDRATE, DEXTROAMPHETAMINE SULFATE AND AMPHETAMINE SULFATE 7.5; 7.5; 7.5; 7.5 MG/1; MG/1; MG/1; MG/1
30 CAPSULE, EXTENDED RELEASE ORAL DAILY
Qty: 30 CAPSULE | Refills: 0 | Status: CANCELLED | OUTPATIENT
Start: 2017-07-28

## 2017-07-28 ASSESSMENT — ANXIETY QUESTIONNAIRES
5. BEING SO RESTLESS THAT IT IS HARD TO SIT STILL: NOT AT ALL
IF YOU CHECKED OFF ANY PROBLEMS ON THIS QUESTIONNAIRE, HOW DIFFICULT HAVE THESE PROBLEMS MADE IT FOR YOU TO DO YOUR WORK, TAKE CARE OF THINGS AT HOME, OR GET ALONG WITH OTHER PEOPLE: NOT DIFFICULT AT ALL
6. BECOMING EASILY ANNOYED OR IRRITABLE: SEVERAL DAYS
3. WORRYING TOO MUCH ABOUT DIFFERENT THINGS: SEVERAL DAYS
7. FEELING AFRAID AS IF SOMETHING AWFUL MIGHT HAPPEN: NOT AT ALL
1. FEELING NERVOUS, ANXIOUS, OR ON EDGE: NOT AT ALL
GAD7 TOTAL SCORE: 4
2. NOT BEING ABLE TO STOP OR CONTROL WORRYING: SEVERAL DAYS

## 2017-07-28 ASSESSMENT — PATIENT HEALTH QUESTIONNAIRE - PHQ9: 5. POOR APPETITE OR OVEREATING: SEVERAL DAYS

## 2017-07-28 NOTE — NURSING NOTE
"Chief Complaint   Patient presents with     Recheck Medication     Patient states everything seems to be going good. Patient is leaving on the 30th for a vacation and would like refills on medication.       Initial /89  Pulse 96  Resp 16  Wt 189 lb 3.2 oz (85.8 kg)  BMI 24.13 kg/m2 Estimated body mass index is 24.13 kg/(m^2) as calculated from the following:    Height as of 6/8/17: 6' 2.25\" (1.886 m).    Weight as of this encounter: 189 lb 3.2 oz (85.8 kg).  Medication Reconciliation: Complete    "

## 2017-07-28 NOTE — MR AVS SNAPSHOT
After Visit Summary   7/28/2017    Timmy Yousif    MRN: [id number]           Patient Information     Date Of Birth          1980        Visit Information        Provider Department      7/28/2017 8:15 AM Carli Vee APRN [facility]        Today's Diagnoses     Attention deficit hyperactivity disorder (ADHD), combined type        Benign essential hypertension          Care Instructions    Treatment Plan:  Amphetamine (Adderall) IR 20 mg twice daily.     Continue mirtazapine (Remeron) 30 mg at bedtime.     Follow up in three months.     - Recommend patient discuss medications with their pharmacist. Risks and benefits for medications were discussed including, but not limited to, side effects.   - Safety plan was reviewed; to the ER as needed or call after hours crisis line; [phone]  - Education and counseling was done regarding use of medications, psychotherapy options  - Call [phone] for appointment or to speak to a nurse.    -Office hours: Monday through Thursday 8:00 am to 4:30 pm; Friday 8:00 am to Noon.   - Patient received a copy of this Treatment Plan today.            Follow-ups after your visit        Who to contact     If you have questions or need follow up information about today's clinic visit or your schedule please contact [facility] directly at [phone].  Normal or non-critical lab and imaging results will be communicated to you by MyChart, letter or phone within 4 business days after the clinic has received the results. If you do not hear from us within 7 days, please contact the clinic through [employer] or phone. If you have a critical or abnormal lab result, we will notify you by phone as soon as possible.  Submit refill requests through [employer] or call your pharmacy and they will forward the refill request to us. Please allow 3 business days for your refill to be completed.          Additional Information About Your  Visit        [facility] [facility] Information     [employer] gives you secure access to your electronic health record. If you see a primary care provider, you can also send messages to your care team and make appointments. If you have questions, please call your primary care clinic.  If you do not have a primary care provider, please call [phone] and they will assist you.        Care EveryWhere ID     This is your Care EveryWhere ID. This could be used by other organizations to access your [place] medical records  [id number]        Your Vitals Were     Pulse Respirations BMI (Body Mass Index)             96 16 24.13 kg/m2          Blood Pressure from Last 3 Encounters:   07/28/17 151/89   06/30/17 (!) 135/97   06/08/17 136/69    Weight from Last 3 Encounters:   07/28/17 189 lb 3.2 oz (85.8 kg)   06/30/17 190 lb (86.2 kg)   06/08/17 196 lb 3 oz (89 kg)              Today, you had the following     No orders found for display         Today's Medication Changes          These changes are accurate as of: 7/28/17  8:37 AM.  If you have any questions, ask your nurse or doctor.               These medicines have changed or have updated prescriptions.        Dose/Directions    amphetamine-dextroamphetamine 20 MG per tablet   Commonly known as:  ADDERALL   This may have changed:    - when to take this  - Another medication with the same name was removed. Continue taking this medication, and follow the directions you see here.   Used for:  Attention deficit hyperactivity disorder (ADHD), combined type   Changed by:  Carli Vee APRN CNS        Dose:  20 mg   Take 1 tablet (20 mg) by mouth 2 times daily   Quantity:  60 tablet   Refills:  0            Where to get your medicines      Some of these will need a paper prescription and others can be bought over the counter.  Ask your nurse if you have questions.     Bring a paper prescription for each of these medications     amphetamine-dextroamphetamine 20 MG per tablet                 Primary Care Provider Office Phone # Fax #    Jair Huffman PA-C [phone]       [address] [address]        Equal Access to Services     MARCUS KIRKPATRICK : Hadii aad ku hadfilippoo Socristinaali, waaxda luqadaha, qaybta kaalmada adeegyada, collette boyce maura goff. So [facility] [phone].    ATENCIÓN: Si habla español, tiene a logan disposición servicios gratuitos de asistencia lingüística. [facility] [phone].    We comply with applicable federal civil rights laws and [place] laws. We do not discriminate on the basis of race, color, national origin, age, disability sex, sexual orientation or gender identity.            Thank you!     Thank you for choosing [facility]  for your care. Our goal is always to provide you with excellent care. Hearing back from our patients is one way we can continue to improve our services. Please take a few minutes to complete the written survey that you may receive in the mail after your visit with us. Thank you!             Your Updated Medication List - Protect others around you: Learn how to safely use, store and throw away your medicines at www.disposemymeds.org.          This list is accurate as of: 7/28/17  8:37 AM.  Always use your most recent med list.                   Brand Name Dispense Instructions for use Diagnosis    amphetamine-dextroamphetamine 20 MG per tablet    ADDERALL    60 tablet    Take 1 tablet (20 mg) by mouth 2 times daily    Attention deficit hyperactivity disorder (ADHD), combined type       cyclobenzaprine 10 MG tablet    FLEXERIL    90 tablet    Take 1 tablet (10 mg) by mouth 3 times daily as needed for muscle spasms    Acute bilateral low back pain with left-sided sciatica       lisinopril 10 MG tablet    PRINIVIL/ZESTRIL    90 tablet    Take 1 tablet (10 mg) by mouth daily    Benign essential hypertension       mirtazapine 30 MG tablet    REMERON    30 tablet    Take  1 tablet (30 mg) by mouth At Bedtime    Major depressive disorder, recurrent episode, moderate (H)       oxyCODONE-acetaminophen 5-325 MG per tablet    PERCOCET    20 tablet    Take 1-2 tablets by mouth every 4 hours as needed for moderate to severe pain

## 2017-07-28 NOTE — PROGRESS NOTES
"      Psychiatric Progress Note    Name: Timmy Yousif  Date: 7/28/2017  Length of Visit: 30 minutes  MRN: [id number]      Current Outpatient Prescriptions   Medication Sig     amphetamine-dextroamphetamine (ADDERALL XR) 30 MG per 24 hr capsule Take 1 capsule (30 mg) by mouth daily     amphetamine-dextroamphetamine (ADDERALL) 20 MG per tablet Take 1 tablet (20 mg) by mouth daily     mirtazapine (REMERON) 30 MG tablet Take 1 tablet (30 mg) by mouth At Bedtime     oxyCODONE-acetaminophen (PERCOCET) 5-325 MG per tablet Take 1-2 tablets by mouth every 4 hours as needed for moderate to severe pain (Patient not taking: Reported on 6/30/2017)     cyclobenzaprine (FLEXERIL) 10 MG tablet Take 1 tablet (10 mg) by mouth 3 times daily as needed for muscle spasms (Patient not taking: Reported on 6/30/2017)     lisinopril (PRINIVIL,ZESTRIL) 10 MG tablet Take 1 tablet (10 mg) by mouth daily     No current facility-administered medications for this visit.        Therapist:  None    PHQ-9:  PHQ-9 score:    PHQ-9 SCORE 6/30/2017   Total Score 3       ROSARIO-7:  ROSARIO-7 SCORE 5/10/2017 6/8/2017 6/30/2017   Total Score 11 9 2           Interim History:  Patient returns for follow up from appointment 6-. Amphetamine (Adderall) XR was increased to 30 mg in the morning and amphetamine (Adderall) IR 20 mg was added in the afternoon. Mirtazapine (Remeron) 30 mg at bedtime was continued. Patient phoned on 7- requesting to adjust or change formulation of the amphetamine (Adderall) to \"have similar results in the afternoon\" and appointment was scheduled.     Today patient reports the amphetamine (Adderall) XR \"comes on too slow\" and the IR tends to be more effective. Patient would like to try amphetamine (Adderall) IR 20 mg twice daily as it lasts from early afternoon until evening. We discussed options and patient would like to try amphetamine (Adderall) IR 20 mg twice daily. Patient denies side effects. Mirtazapine (Remeron) " "continues to be helpful for sleep and mood.       Past Medical History:   Diagnosis Date     Arthritis December 2009    Back     Benign essential hypertension        10 point ROS is negative except for those listed above.     Vital Signs:   /89  Pulse 96  Resp 16  Wt 189 lb 3.2 oz (85.8 kg)  BMI 24.13 kg/m2      Mental Status Assessment:  Appearance:  Well groomed      Behavior/relationship to examiner/demeanor:  Cooperative, engaged and pleasant  Motor activity:  Normal  Gait:  Normal   Speech:  Normal in volume, articulation, coherence   Mood (subjective report):  \"Great\"  Affect (objective appearance):  Mood congruent  Thought Process (Associations):  Logical, linear and goal directed  Thought content:  No evidence of suicidal or homicidal ideation,          no overt psychosis and                    patient does not appear to be responding to internal stimuli  Oriented to person, place, date/time   Attention Span and concentration: Intact   Memory:  Short-term memory intact and Long-term memory; Intact  Language:  Fluent   Fund of Knowledge/Intelligence:  Average  Use of language: Intact   Abstraction:  Normal  Insight:  Adequate  Judgment:  Adequate for safety    DSM DIAGNOSIS:  Attention-Deficit/Hyperactivity Disorder, Combined presentation (F90.2)  296.22 Major Depressive Disorder, Single Episode, Moderate _ and With anxious distress  300.01 (F41.0) Panic Disorder  300.02 (F41.1) Generalized Anxiety Disorder    Assessment:  Patient is responding well to amphetamine (Adderall) IR formulation and will try that twice daily.    Medication side effects and alternatives were reviewed.     Treatment Plan:  Amphetamine (Adderall) IR 20 mg twice daily. If this does not work well, phone to return to previous dose of amphetamine (Adderall) XR 30 mg in the morning and amphetamine (Adderall) IR 20 mg in the afternoon.     Continue mirtazapine (Remeron) 30 mg at bedtime.     Follow up in three months.     - " Recommend patient discuss medications with their pharmacist. Risks and benefits for medications were discussed including, but not limited to, side effects.   - Safety plan was reviewed; to the ER as needed or call after hours crisis line; [phone]  - Education and counseling was done regarding use of medications, psychotherapy options  - Call [phone] for appointment or to speak to a nurse.    -Office hours: Monday through Thursday 8:00 am to 4:30 pm; Friday 8:00 am to Noon.   - Patient received a copy of this Treatment Plan today.    Patient will continue to be seen for ongoing consultation and stabilization.      Signed:  Carli Vee RN, MS, CNS-BC

## 2017-07-28 NOTE — PATIENT INSTRUCTIONS
Treatment Plan:  Amphetamine (Adderall) IR 20 mg twice daily.     Continue mirtazapine (Remeron) 30 mg at bedtime.     Follow up in three months.     - Recommend patient discuss medications with their pharmacist. Risks and benefits for medications were discussed including, but not limited to, side effects.   - Safety plan was reviewed; to the ER as needed or call after hours crisis line; [phone]  - Education and counseling was done regarding use of medications, psychotherapy options  - Call [phone] for appointment or to speak to a nurse.    -Office hours: Monday through Thursday 8:00 am to 4:30 pm; Friday 8:00 am to Noon.   - Patient received a copy of this Treatment Plan today.

## 2017-07-29 ASSESSMENT — PATIENT HEALTH QUESTIONNAIRE - PHQ9: SUM OF ALL RESPONSES TO PHQ QUESTIONS 1-9: 2

## 2017-07-29 ASSESSMENT — ANXIETY QUESTIONNAIRES: GAD7 TOTAL SCORE: 4

## 2017-08-09 ENCOUNTER — TELEPHONE (OUTPATIENT)
Dept: PSYCHIATRY | Facility: CLINIC | Age: 37
End: 2017-08-09

## 2017-08-09 NOTE — TELEPHONE ENCOUNTER
Patient has available refills at his [facility] pharmacy. Message left of [facility] to do a pharmacy transfer.

## 2017-08-09 NOTE — TELEPHONE ENCOUNTER
Reason for call:  Other   Patient called regarding (reason for call): call back  Additional comments: Looking for where the refill request for Mirtazapine is in the process.       Phone number to reach patient:  Other phone number:  [phone]    Best Time:  ASAP     Can we leave a detailed message on this number?  YES

## 2017-08-16 DIAGNOSIS — F33.1 MAJOR DEPRESSIVE DISORDER, RECURRENT EPISODE, MODERATE (H): ICD-10-CM

## 2017-08-16 RX ORDER — MIRTAZAPINE 30 MG/1
30 TABLET, FILM COATED ORAL AT BEDTIME
Qty: 30 TABLET | Refills: 3 | Status: CANCELLED | OUTPATIENT
Start: 2017-08-16

## 2017-08-16 NOTE — TELEPHONE ENCOUNTER
Remeron       Last Written Prescription Date: 0608/2017  Last Fill Quantity: 30; # refills: 3  Last Office Visit with [facility], [facility] or  Health prescribing provider:  07/28/2017        Last PHQ-9 score on record=   PHQ-9 SCORE 7/28/2017   Total Score 2       Lab Results   Component Value Date    AST 20 02/09/2016     Lab Results   Component Value Date    ALT 31 02/09/2016

## 2017-08-18 ENCOUNTER — TELEPHONE (OUTPATIENT)
Dept: PSYCHIATRY | Facility: CLINIC | Age: 37
End: 2017-08-18

## 2017-08-18 NOTE — TELEPHONE ENCOUNTER
Reason for call:  Other   Patient called regarding (reason for call): prescription  Additional comments: Patient said two pharmacies have been calling for refills.  He would like to use the [facility] in [place] and is hoping to get them refilled today.       Phone number to reach patient:  Home number on file [phone] (home)    Best Time:  any    Can we leave a detailed message on this number?  YES

## 2017-08-21 NOTE — TELEPHONE ENCOUNTER
RN spoke to pharmacy again today. They will call and transfer the refills.       Carli Vee APRN CNS   Clinical Nurse Specialist    Conversation   (Newest Message First)   Me        8/9/17 1:12 PM   Note      Patient has available refills at his [facility] pharmacy. Message left of [facility] to do a pharmacy transfer.                   8/9/17 11:13 AM   Brittney Morejon routed this conversation to Psych Rn - Brittney Horton        8/9/17 11:12 AM   Note      Reason for call:  Other   Patient called regarding (reason for call): call back  Additional comments: Looking for where the refill request for Mirtazapine is in the process.         Phone number to reach patient:  Other phone number:  [phone]     Best Time:  ASAP                      Can we leave a detailed message on this number?  YES

## 2017-08-22 ENCOUNTER — MYC MEDICAL ADVICE (OUTPATIENT)
Dept: PSYCHIATRY | Facility: CLINIC | Age: 37
End: 2017-08-22

## 2017-08-22 ENCOUNTER — MYC REFILL (OUTPATIENT)
Dept: FAMILY MEDICINE | Facility: CLINIC | Age: 37
End: 2017-08-22

## 2017-08-22 DIAGNOSIS — F33.1 MAJOR DEPRESSIVE DISORDER, RECURRENT EPISODE, MODERATE (H): ICD-10-CM

## 2017-08-22 DIAGNOSIS — I10 BENIGN ESSENTIAL HYPERTENSION: ICD-10-CM

## 2017-08-22 DIAGNOSIS — F90.2 ATTENTION DEFICIT HYPERACTIVITY DISORDER (ADHD), COMBINED TYPE: ICD-10-CM

## 2017-08-22 RX ORDER — LISINOPRIL 10 MG/1
10 TABLET ORAL DAILY
Qty: 90 TABLET | Refills: 0 | Status: SHIPPED | OUTPATIENT
Start: 2017-08-22 | End: 2018-05-31

## 2017-08-22 RX ORDER — DEXTROAMPHETAMINE SACCHARATE, AMPHETAMINE ASPARTATE, DEXTROAMPHETAMINE SULFATE AND AMPHETAMINE SULFATE 5; 5; 5; 5 MG/1; MG/1; MG/1; MG/1
20 TABLET ORAL 2 TIMES DAILY
Qty: 60 TABLET | Refills: 0 | Status: SHIPPED | OUTPATIENT
Start: 2017-10-19 | End: 2017-12-01

## 2017-08-22 RX ORDER — DEXTROAMPHETAMINE SACCHARATE, AMPHETAMINE ASPARTATE, DEXTROAMPHETAMINE SULFATE AND AMPHETAMINE SULFATE 5; 5; 5; 5 MG/1; MG/1; MG/1; MG/1
20 TABLET ORAL 2 TIMES DAILY
Qty: 60 TABLET | Refills: 0 | Status: SHIPPED | OUTPATIENT
Start: 2017-09-20 | End: 2017-12-01

## 2017-08-22 RX ORDER — DEXTROAMPHETAMINE SACCHARATE, AMPHETAMINE ASPARTATE, DEXTROAMPHETAMINE SULFATE AND AMPHETAMINE SULFATE 5; 5; 5; 5 MG/1; MG/1; MG/1; MG/1
20 TABLET ORAL 2 TIMES DAILY
Qty: 60 TABLET | Refills: 0 | Status: SHIPPED | OUTPATIENT
Start: 2017-08-22 | End: 2017-08-22

## 2017-08-22 NOTE — TELEPHONE ENCOUNTER
Rx walked to the [facility]. Called and notified patient via voicemail that this has been done.  Rosemary Piedra CMA..........8/22/2017 3:52 PM

## 2017-08-22 NOTE — TELEPHONE ENCOUNTER
Patient requesting refill of Adderal. Not on RN refill protocol. He is due back in the end of October.             Last Written Prescription Date: 7/28/17  Last Fill Quantity: 30, # refills: 0    Last Office Visit with [facility], [facility] or [facility] prescribing provider:  7/28/17   Future Office Visit:    None scheduled.    BP Readings from Last 3 Encounters:   07/28/17 151/89   06/30/17 (!) 135/97   06/08/17 136/69          From 07/28/2017 office visit:  DSM DIAGNOSIS:  Attention-Deficit/Hyperactivity Disorder, Combined presentation (F90.2)  296.22 Major Depressive Disorder, Single Episode, Moderate _ and With anxious distress  300.01 (F41.0) Panic Disorder  300.02 (F41.1) Generalized Anxiety Disorder     Assessment:  Patient is responding well to amphetamine (Adderall) IR formulation and will try that twice daily.     Medication side effects and alternatives were reviewed.      Treatment Plan:  Amphetamine (Adderall) IR 20 mg twice daily. If this does not work well, phone to return to previous dose of amphetamine (Adderall) XR 30 mg in the morning and amphetamine (Adderall) IR 20 mg in the afternoon.      Continue mirtazapine (Remeron) 30 mg at bedtime.      Follow up in three months.      - Recommend patient discuss medications with their pharmacist. Risks and benefits for medications were discussed including, but not limited to, side effects.   - Safety plan was reviewed; to the ER as needed or call after hours crisis line; [phone]  - Education and counseling was done regarding use of medications, psychotherapy options  - Call [phone] for appointment or to speak to a nurse.    -Office hours: Monday through Thursday 8:00 am to 4:30 pm; Friday 8:00 am to Noon.   - Patient received a copy of this Treatment Plan today.     Patient will continue to be seen for ongoing consultation and stabilization.        Signed:  Carli Vee RN, MS, CNS-BC

## 2017-08-22 NOTE — TELEPHONE ENCOUNTER
Message from [employer]:  Original authorizing provider: ALEJANDRA Medina would like a refill of the following medications:  lisinopril (PRINIVIL,ZESTRIL) 10 MG tablet [Jair Huffman PA-C]    Preferred pharmacy: [facility] [id number] [facility], [place] - [address]    Comment:

## 2017-09-21 ENCOUNTER — MYC MEDICAL ADVICE (OUTPATIENT)
Dept: PSYCHIATRY | Facility: CLINIC | Age: 37
End: 2017-09-21

## 2017-10-17 ENCOUNTER — TELEPHONE (OUTPATIENT)
Dept: FAMILY MEDICINE | Facility: CLINIC | Age: 37
End: 2017-10-17

## 2017-10-17 ASSESSMENT — PATIENT HEALTH QUESTIONNAIRE - PHQ9: SUM OF ALL RESPONSES TO PHQ QUESTIONS 1-9: 2

## 2017-10-18 ENCOUNTER — MYC MEDICAL ADVICE (OUTPATIENT)
Dept: PSYCHIATRY | Facility: CLINIC | Age: 37
End: 2017-10-18

## 2017-10-19 NOTE — TELEPHONE ENCOUNTER
Called  pharmacy, and they have an active RX that they will prep for the Pt to  Friday 10/20    [facility] [place] Date: 10/19/17  Query Report Page#: 1  Patient Rx History Report  BROWN M  Search Criteria: Last Name Mervin' and First Name INDIRA' and  = '[birth year]' and Request Period = '10/19/16' to  '10/19/17' - 1 out of 2 Recipients Selected.  Fill Date Product, Str, Form Qty Days Pt ID Prescriber Written RX# N/R* Pharm **MED+  ---------- -------------------------------- ------ ---- --------- ---------- ---------- ------------ ----- --------- ------  2017 DEXTROAMP-AMPHETAMIN 20 MG TAB 60.00 30 [id number] 2017 [id number] N [id number] 00.0  2017 DEXTROAMP-AMPHETAMIN 20 MG TAB 60.00 30 [id number] 2017 [id number] N [id number] 00.0  2017 DEXTROAMP-AMPHETAMIN 20 MG TAB 60.00 30 [id number] 2017 [id number] N [id number] 00.0  2017 DEXTROAMP-AMPHET ER 30 MG CAP 30.00 30 [id number] 2017 [id number] N [id number] 00.0  2017 DEXTROAMP-AMPHETAMIN 20 MG TAB 30.00 30 [id number] 2017 [id number] N [id number] 00.0  2017 DEXTROAMP-AMPHET ER 20 MG CAP 30.00 30 [id number] 2017 [id number] N [id number] 00.0  2016 OXYCODONE-ACETAMINOPHEN 5-325 20.00 2 [id number] 2016 [id number] N [id number] 75.0  *N/R N=New R=Refill  +MED Daily  Prescribers for prescriptions listed  ----------------------------------------------------------------------------------------------------------------------------------  [id number] TYRELL LOCKHART MD; [facility], C/O MEDICAL STAFF OFFICE, [address] [place] [address]  [id number] SOLOMON VILLALTA ([facility]); [facility], [facility] [address]  Pharmacies that dispensed prescriptions listed  ----------------------------------------------------------------------------------------------------------------------------------  [id number] [facility]  [facility] [address] [facility] [place] [address],  Patients that match search criteria  ----------------------------------------------------------------------------------------------------------------------------------  [id number] SOHAN MAY [birth year]; [address], [place] [address]  MED Summary  This section displays cumulative MED values by unique recipient. The MED Max value is the maximum occurrence of cumulative MED  sustained for any 3 consecutive days. This value is calculated based on prescriptions dispensed during the date range requested.  -----------------------------------------------------------------------------------------------------------------------------------  0 GUIDO SCOTT; 1980; David PALACIOS, [place] [address]

## 2017-12-01 ENCOUNTER — OFFICE VISIT (OUTPATIENT)
Dept: PSYCHIATRY | Facility: CLINIC | Age: 37
End: 2017-12-01
Payer: COMMERCIAL

## 2017-12-01 VITALS
SYSTOLIC BLOOD PRESSURE: 156 MMHG | TEMPERATURE: 96.7 F | DIASTOLIC BLOOD PRESSURE: 97 MMHG | BODY MASS INDEX: 25.23 KG/M2 | WEIGHT: 197.8 LBS | HEART RATE: 106 BPM

## 2017-12-01 DIAGNOSIS — F33.1 MAJOR DEPRESSIVE DISORDER, RECURRENT EPISODE, MODERATE (H): ICD-10-CM

## 2017-12-01 DIAGNOSIS — F90.2 ATTENTION DEFICIT HYPERACTIVITY DISORDER (ADHD), COMBINED TYPE: ICD-10-CM

## 2017-12-01 PROCEDURE — 99213 OFFICE O/P EST LOW 20 MIN: CPT | Performed by: CLINICAL NURSE SPECIALIST

## 2017-12-01 RX ORDER — DEXTROAMPHETAMINE SACCHARATE, AMPHETAMINE ASPARTATE, DEXTROAMPHETAMINE SULFATE AND AMPHETAMINE SULFATE 5; 5; 5; 5 MG/1; MG/1; MG/1; MG/1
20 TABLET ORAL 2 TIMES DAILY
Qty: 60 TABLET | Refills: 0 | Status: SHIPPED | OUTPATIENT
Start: 2017-12-01 | End: 2018-02-27

## 2017-12-01 RX ORDER — DEXTROAMPHETAMINE SACCHARATE, AMPHETAMINE ASPARTATE, DEXTROAMPHETAMINE SULFATE AND AMPHETAMINE SULFATE 5; 5; 5; 5 MG/1; MG/1; MG/1; MG/1
20 TABLET ORAL 2 TIMES DAILY
Qty: 60 TABLET | Refills: 0 | Status: SHIPPED | OUTPATIENT
Start: 2017-12-28 | End: 2018-02-28

## 2017-12-01 RX ORDER — DEXTROAMPHETAMINE SACCHARATE, AMPHETAMINE ASPARTATE, DEXTROAMPHETAMINE SULFATE AND AMPHETAMINE SULFATE 5; 5; 5; 5 MG/1; MG/1; MG/1; MG/1
20 TABLET ORAL 2 TIMES DAILY
Qty: 60 TABLET | Refills: 0 | Status: SHIPPED | OUTPATIENT
Start: 2018-01-26 | End: 2018-02-28

## 2017-12-01 RX ORDER — MIRTAZAPINE 30 MG/1
30 TABLET, FILM COATED ORAL AT BEDTIME
Qty: 30 TABLET | Refills: 11 | Status: SHIPPED | OUTPATIENT
Start: 2017-12-01 | End: 2018-12-03

## 2017-12-01 ASSESSMENT — ANXIETY QUESTIONNAIRES
4. TROUBLE RELAXING: NOT AT ALL
5. BEING SO RESTLESS THAT IT IS HARD TO SIT STILL: NOT AT ALL
IF YOU CHECKED OFF ANY PROBLEMS ON THIS QUESTIONNAIRE, HOW DIFFICULT HAVE THESE PROBLEMS MADE IT FOR YOU TO DO YOUR WORK, TAKE CARE OF THINGS AT HOME, OR GET ALONG WITH OTHER PEOPLE: NOT DIFFICULT AT ALL
7. FEELING AFRAID AS IF SOMETHING AWFUL MIGHT HAPPEN: NOT AT ALL
3. WORRYING TOO MUCH ABOUT DIFFERENT THINGS: NOT AT ALL
1. FEELING NERVOUS, ANXIOUS, OR ON EDGE: NOT AT ALL
GAD7 TOTAL SCORE: 0
2. NOT BEING ABLE TO STOP OR CONTROL WORRYING: NOT AT ALL
6. BECOMING EASILY ANNOYED OR IRRITABLE: NOT AT ALL

## 2017-12-01 ASSESSMENT — PATIENT HEALTH QUESTIONNAIRE - PHQ9: SUM OF ALL RESPONSES TO PHQ QUESTIONS 1-9: 0

## 2017-12-01 NOTE — NURSING NOTE
"Chief Complaint   Patient presents with     Recheck Medication       Initial BP (!) 156/97 (BP Location: Left arm, Patient Position: Sitting, Cuff Size: Adult Regular)  Pulse 106  Temp 96.7  F (35.9  C) (Tympanic)  Wt 197 lb 12.8 oz (89.7 kg)  BMI 25.23 kg/m2 Estimated body mass index is 25.23 kg/(m^2) as calculated from the following:    Height as of 6/8/17: 6' 2.25\" (1.886 m).    Weight as of this encounter: 197 lb 12.8 oz (89.7 kg).  Medication Reconciliation: complete    "

## 2017-12-01 NOTE — PATIENT INSTRUCTIONS
Treatment Plan:  Continue mirtazapine (Remeron) 30 mg at bedtime and amphetamine (Adderall) 20 mg twice daily.     Follow up 6 months.    - Recommend patient discuss medications with their pharmacist. Risks and benefits for medications were discussed including, but not limited to, side effects.   - Safety plan was reviewed; to the ER as needed or call after hours crisis line; [phone]  - Education and counseling was done regarding use of medications, psychotherapy options  - Call [phone] for appointment or to speak to a nurse.    -Office hours: Monday through Thursday 8:00 am to 4:30 pm; Friday 8:00 am to Noon.   - Patient received a copy of this Treatment Plan today.

## 2017-12-01 NOTE — PROGRESS NOTES
"      Psychiatric Progress Note    Name: Timmy Yousif  Date: 12/1/2017  Length of Visit: Spent 18 minutes face to face with this patient, where at least 50 % of this time was spent in counseling on/or about managing 3 small children.     MRN: [id number]      Current Outpatient Prescriptions   Medication Sig     lisinopril (PRINIVIL/ZESTRIL) 10 MG tablet Take 1 tablet (10 mg) by mouth daily     amphetamine-dextroamphetamine (ADDERALL) 20 MG per tablet Take 1 tablet (20 mg) by mouth 2 times daily     mirtazapine (REMERON) 30 MG tablet Take 1 tablet (30 mg) by mouth At Bedtime     amphetamine-dextroamphetamine (ADDERALL) 20 MG per tablet Take 1 tablet (20 mg) by mouth 2 times daily     No current facility-administered medications for this visit.        Therapist:  None    PHQ-9:  PHQ-9 score:    PHQ-9 SCORE 10/17/2017   Total Score 2       ROSARIO-7:  ROSARIO-7 SCORE 6/8/2017 6/30/2017 7/28/2017   Total Score 9 2 4           Interim History:  Patient returns for follow up from appointment 7-. Mirtazapine (Remeron) 30 mg at bedtime was continued. Amphetamine (Adderall) was changed to 20 mg twice daily.    Patient reports the amphetamine (Adderall) 20 mg twice daily is working well. Patient denies side effects.      Patient reports URI, his children are also ill.     Past Medical History:   Diagnosis Date     Arthritis December 2009    Back     Benign essential hypertension        10 point ROS is negative except for those listed above.     Vital Signs:   BP (!) 156/97 (BP Location: Left arm, Patient Position: Sitting, Cuff Size: Adult Regular)  Pulse 106  Temp 96.7  F (35.9  C) (Tympanic)  Wt 197 lb 12.8 oz (89.7 kg)  BMI 25.23 kg/m2      Mental Status Assessment:  Appearance:  Well groomed      Behavior/relationship to examiner/demeanor:  Cooperative, engaged and pleasant  Motor activity:  Normal  Gait:  Normal   Speech:  Normal in volume, articulation, coherence   Mood (subjective report):  \"Good\"  Affect " (objective appearance):  Mood congruent  Thought Process (Associations):  Logical, linear and goal directed  Thought content:  No evidence of suicidal or homicidal ideation,          no overt psychosis and                    patient does not appear to be responding to internal stimuli  Oriented to person, place, date/time   Attention Span and concentration: Intact   Memory:  Short-term memory intact and Long-term memory; Intact  Language:  Fluent   Fund of Knowledge/Intelligence:  Average  Use of language: Intact   Abstraction:  Normal  Insight:  Adequate  Judgment:  Adequate for safety    DSM DIAGNOSIS:  Attention-Deficit/Hyperactivity Disorder, Combined presentation (F90.2)  296.22 Major Depressive Disorder, Single Episode, Moderate _ and With anxious distress  300.01 (F41.0) Panic Disorder  300.02 (F41.1) Generalized Anxiety Disorder    Assessment:  Patient is stable on current medications. Patient denies side effects.     Medication side effects and alternatives were reviewed.     Treatment Plan:  Continue mirtazapine (Remeron) 30 mg at bedtime and amphetamine (Adderall) 20 mg twice daily.     Follow up 6 months. Phone for refills in 3 months.    - Recommend patient discuss medications with their pharmacist. Risks and benefits for medications were discussed including, but not limited to, side effects.   - Safety plan was reviewed; to the ER as needed or call after hours crisis line; [phone]  - Education and counseling was done regarding use of medications, psychotherapy options  - Call [phone] for appointment or to speak to a nurse.    -Office hours: Monday through Thursday 8:00 am to 4:30 pm; Friday 8:00 am to Noon.   - Patient received a copy of this Treatment Plan today.    Patient will continue to be seen for ongoing consultation and stabilization.      Signed:  Carli Vee, RN, MS, CNS-BC

## 2017-12-01 NOTE — MR AVS SNAPSHOT
After Visit Summary   12/1/2017    Timmy Yousif    MRN: [id number]           Patient Information     Date Of Birth          1980        Visit Information        Provider Department      12/1/2017 9:15 AM Carli Vee APRN [facility]        Today's Diagnoses     Major depressive disorder, recurrent episode, moderate (H)        Attention deficit hyperactivity disorder (ADHD), combined type          Care Instructions    Treatment Plan:  Continue mirtazapine (Remeron) 30 mg at bedtime and amphetamine (Adderall) 20 mg twice daily.     Follow up 6 months.    - Recommend patient discuss medications with their pharmacist. Risks and benefits for medications were discussed including, but not limited to, side effects.   - Safety plan was reviewed; to the ER as needed or call after hours crisis line; [phone]  - Education and counseling was done regarding use of medications, psychotherapy options  - Call [phone] for appointment or to speak to a nurse.    -Office hours: Monday through Thursday 8:00 am to 4:30 pm; Friday 8:00 am to Noon.   - Patient received a copy of this Treatment Plan today.            Follow-ups after your visit        Who to contact     If you have questions or need follow up information about today's clinic visit or your schedule please contact [facility] directly at [phone].  Normal or non-critical lab and imaging results will be communicated to you by MyChart, letter or phone within 4 business days after the clinic has received the results. If you do not hear from us within 7 days, please contact the clinic through MyChart or phone. If you have a critical or abnormal lab result, we will notify you by phone as soon as possible.  Submit refill requests through [employer] or call your pharmacy and they will forward the refill request to us. Please allow 3 business days for your refill to be completed.          Additional  Information About Your Visit        [email or web address] Information     [employer] gives you secure access to your electronic health record. If you see a primary care provider, you can also send messages to your care team and make appointments. If you have questions, please call your primary care clinic.  If you do not have a primary care provider, please call [phone] and they will assist you.        Care EveryWhere ID     This is your Care EveryWhere ID. This could be used by other organizations to access your [place] medical records  [id number]        Your Vitals Were     Pulse Temperature BMI (Body Mass Index)             106 96.7  F (35.9  C) (Tympanic) 25.23 kg/m2          Blood Pressure from Last 3 Encounters:   12/01/17 (!) 156/97   07/28/17 151/89   06/30/17 (!) 135/97    Weight from Last 3 Encounters:   12/01/17 197 lb 12.8 oz (89.7 kg)   07/28/17 189 lb 3.2 oz (85.8 kg)   06/30/17 190 lb (86.2 kg)              Today, you had the following     No orders found for display         Today's Medication Changes          These changes are accurate as of: 12/1/17  9:32 AM.  If you have any questions, ask your nurse or doctor.               These medicines have changed or have updated prescriptions.        Dose/Directions    * amphetamine-dextroamphetamine 20 MG per tablet   Commonly known as:  ADDERALL   This may have changed:  Another medication with the same name was added. Make sure you understand how and when to take each.   Used for:  Attention deficit hyperactivity disorder (ADHD), combined type   Changed by:  Carli Vee APRN CNS        Dose:  20 mg   Take 1 tablet (20 mg) by mouth 2 times daily   Quantity:  60 tablet   Refills:  0       * amphetamine-dextroamphetamine 20 MG per tablet   Commonly known as:  ADDERALL   This may have changed:  Another medication with the same name was added. Make sure you understand how and when to take each.   Used for:  Attention deficit hyperactivity disorder (ADHD),  combined type   Changed by:  Carli Vee APRN CNS        Dose:  20 mg   Start taking on:  12/28/2017   Take 1 tablet (20 mg) by mouth 2 times daily   Quantity:  60 tablet   Refills:  0       * amphetamine-dextroamphetamine 20 MG per tablet   Commonly known as:  ADDERALL   This may have changed:  You were already taking a medication with the same name, and this prescription was added. Make sure you understand how and when to take each.   Used for:  Attention deficit hyperactivity disorder (ADHD), combined type   Changed by:  Carli Vee APRN CNS        Dose:  20 mg   Start taking on:  1/26/2018   Take 1 tablet (20 mg) by mouth 2 times daily   Quantity:  60 tablet   Refills:  0       * Notice:  This list has 3 medication(s) that are the same as other medications prescribed for you. Read the directions carefully, and ask your doctor or other care provider to review them with you.      Stop taking these medicines if you haven't already. Please contact your care team if you have questions.     cyclobenzaprine 10 MG tablet   Commonly known as:  FLEXERIL   Stopped by:  Carli Vee APRN CNS           oxyCODONE-acetaminophen 5-325 MG per tablet   Commonly known as:  PERCOCET   Stopped by:  Carli Vee APRN CNS                Where to get your medicines      These medications were sent to [facility] [place], [place] - [address]  [address] [address]     Phone:  [phone]     mirtazapine 30 MG tablet         Some of these will need a paper prescription and others can be bought over the counter.  Ask your nurse if you have questions.     Bring a paper prescription for each of these medications     amphetamine-dextroamphetamine 20 MG per tablet    amphetamine-dextroamphetamine 20 MG per tablet    amphetamine-dextroamphetamine 20 MG per tablet                Primary Care Provider Office Phone # Fax #    Jair Huffman PA-C [phone]  [phone]       [address] [place] [address]        Equal Access to Services     MARCUS KAYA : Hadii aad ku hadfilippogustavo Solucia, waaxda lugildardoadaha, qaybta kimberlyeddda vikibhavikbobo, collette de la o shiloheulalio turciosdamon janegentryerica goff. So [facility] [phone].    ATENCIÓN: Si habla español, tiene a logan disposición servicios gratuitos de asistencia lingüística. Llame al [phone].    We comply with applicable federal civil rights laws and [place] laws. We do not discriminate on the basis of race, color, national origin, age, disability, sex, sexual orientation, or gender identity.            Thank you!     Thank you for choosing [facility]  for your care. Our goal is always to provide you with excellent care. Hearing back from our patients is one way we can continue to improve our services. Please take a few minutes to complete the written survey that you may receive in the mail after your visit with us. Thank you!             Your Updated Medication List - Protect others around you: Learn how to safely use, store and throw away your medicines at www.disposemymeds.org.          This list is accurate as of: 12/1/17  9:32 AM.  Always use your most recent med list.                   Brand Name Dispense Instructions for use Diagnosis    * amphetamine-dextroamphetamine 20 MG per tablet    ADDERALL    60 tablet    Take 1 tablet (20 mg) by mouth 2 times daily    Attention deficit hyperactivity disorder (ADHD), combined type       * amphetamine-dextroamphetamine 20 MG per tablet   Start taking on:  12/28/2017    ADDERALL    60 tablet    Take 1 tablet (20 mg) by mouth 2 times daily    Attention deficit hyperactivity disorder (ADHD), combined type       * amphetamine-dextroamphetamine 20 MG per tablet   Start taking on:  1/26/2018    ADDERALL    60 tablet    Take 1 tablet (20 mg) by mouth 2 times daily    Attention deficit hyperactivity disorder (ADHD), combined type       lisinopril 10 MG tablet    PRINIVIL/ZESTRIL    90  tablet    Take 1 tablet (10 mg) by mouth daily    Benign essential hypertension       mirtazapine 30 MG tablet    REMERON    30 tablet    Take 1 tablet (30 mg) by mouth At Bedtime    Major depressive disorder, recurrent episode, moderate (H)       * Notice:  This list has 3 medication(s) that are the same as other medications prescribed for you. Read the directions carefully, and ask your doctor or other care provider to review them with you.

## 2017-12-02 ASSESSMENT — ANXIETY QUESTIONNAIRES: GAD7 TOTAL SCORE: 0

## 2017-12-26 ENCOUNTER — MYC MEDICAL ADVICE (OUTPATIENT)
Dept: PSYCHIATRY | Facility: CLINIC | Age: 37
End: 2017-12-26

## 2017-12-27 ENCOUNTER — TELEPHONE (OUTPATIENT)
Dept: FAMILY MEDICINE | Facility: CLINIC | Age: 37
End: 2017-12-27

## 2017-12-27 NOTE — TELEPHONE ENCOUNTER
[facility] is requesting Rx of Mirtazapine be transferred to them in [place]

## 2018-02-27 ENCOUNTER — TELEPHONE (OUTPATIENT)
Dept: PSYCHIATRY | Facility: CLINIC | Age: 38
End: 2018-02-27

## 2018-02-27 DIAGNOSIS — F90.2 ATTENTION DEFICIT HYPERACTIVITY DISORDER (ADHD), COMBINED TYPE: ICD-10-CM

## 2018-02-27 NOTE — TELEPHONE ENCOUNTER
Reason for call:  Medication   If this is a refill request, has the caller requested the refill from the pharmacy already? Yes  Will the patient be using a [facility]? Yes  Name of the pharmacy and phone number for the current request: [facility]    Name of the medication requested: Adderall    Other request: Pt will be going out of town on Thursday, March 1st, needs to  prescription tomorrow 2/28.    Phone number to reach patient:  Home number on file [phone] (home)    Best Time:  anytime    Can we leave a detailed message on this number?  YES

## 2018-02-28 RX ORDER — DEXTROAMPHETAMINE SACCHARATE, AMPHETAMINE ASPARTATE, DEXTROAMPHETAMINE SULFATE AND AMPHETAMINE SULFATE 5; 5; 5; 5 MG/1; MG/1; MG/1; MG/1
20 TABLET ORAL 2 TIMES DAILY
Qty: 60 TABLET | Refills: 0 | Status: SHIPPED | OUTPATIENT
Start: 2018-03-28 | End: 2018-05-31

## 2018-02-28 RX ORDER — DEXTROAMPHETAMINE SACCHARATE, AMPHETAMINE ASPARTATE, DEXTROAMPHETAMINE SULFATE AND AMPHETAMINE SULFATE 5; 5; 5; 5 MG/1; MG/1; MG/1; MG/1
20 TABLET ORAL 2 TIMES DAILY
Qty: 60 TABLET | Refills: 0 | Status: SHIPPED | OUTPATIENT
Start: 2018-04-28 | End: 2018-05-31

## 2018-02-28 RX ORDER — DEXTROAMPHETAMINE SACCHARATE, AMPHETAMINE ASPARTATE, DEXTROAMPHETAMINE SULFATE AND AMPHETAMINE SULFATE 5; 5; 5; 5 MG/1; MG/1; MG/1; MG/1
20 TABLET ORAL 2 TIMES DAILY
Qty: 60 TABLET | Refills: 0 | Status: SHIPPED | OUTPATIENT
Start: 2018-02-28 | End: 2018-05-31

## 2018-02-28 NOTE — TELEPHONE ENCOUNTER
Patient phoning is for refills per plan. Will route to provider.     From 12/1/2017:  Treatment Plan:  Continue mirtazapine (Remeron) 30 mg at bedtime and amphetamine (Adderall) 20 mg twice daily.      Follow up 6 months. Phone for refills in 3 months.     - Recommend patient discuss medications with their pharmacist. Risks and benefits for medications were discussed including, but not limited to, side effects.   - Safety plan was reviewed; to the ER as needed or call after hours crisis line; [phone]  - Education and counseling was done regarding use of medications, psychotherapy options  - Call [phone] for appointment or to speak to a nurse.    -Office hours: Monday through Thursday 8:00 am to 4:30 pm; Friday 8:00 am to Noon.   - Patient received a copy of this Treatment Plan today.     Patient will continue to be seen for ongoing consultation and stabilization.        Signed:  Carli Vee RN, MS, CNS-BC

## 2018-02-28 NOTE — TELEPHONE ENCOUNTER
[facility] at [facility]. Patient is doing well and please ask about returning to PCP for future refills. Thanks.

## 2018-02-28 NOTE — TELEPHONE ENCOUNTER
RN left vm for patient that refill is ready.  Requested a return call to discuss transferring back to his PCP.

## 2018-05-29 ENCOUNTER — MYC MEDICAL ADVICE (OUTPATIENT)
Dept: PHARMACY | Facility: OTHER | Age: 38
End: 2018-05-29
Payer: COMMERCIAL

## 2018-05-29 ENCOUNTER — TELEPHONE (OUTPATIENT)
Dept: PSYCHIATRY | Facility: CLINIC | Age: 38
End: 2018-05-29

## 2018-05-29 DIAGNOSIS — F90.2 ATTENTION DEFICIT HYPERACTIVITY DISORDER (ADHD), COMBINED TYPE: ICD-10-CM

## 2018-05-29 RX ORDER — DEXTROAMPHETAMINE SACCHARATE, AMPHETAMINE ASPARTATE, DEXTROAMPHETAMINE SULFATE AND AMPHETAMINE SULFATE 5; 5; 5; 5 MG/1; MG/1; MG/1; MG/1
20 TABLET ORAL 2 TIMES DAILY
Qty: 60 TABLET | Refills: 0 | Status: CANCELLED | OUTPATIENT
Start: 2018-05-29

## 2018-05-29 NOTE — TELEPHONE ENCOUNTER
RN spoke to patient. He requested refill of his Adderall.  RN advised patient to contact his PCP for an appointment to discuss ongoing refills.      Patient was aware that Carli JACK has resigned from clinic, with her last day being 6/8/18.    Thank you,  Cass Cameron RN-BC  Nurse Liaison   [facility] Psych[facility]ry Services: [phone]  Addiction Medicine Services: [phone]       Patient did not return call from February, see below.     February 28, 2018   Me        2:35 PM   Note      RN left vm for patient that refill is ready.  Requested a return call to discuss transferring back to his PCP.          Carli Vee APRN CNS   to Psych Rn - [facility]        10:36 AM   Note      Refills at [facility]. Patient is doing well and please ask about returning to PCP for future refills. Thanks.                   9:18 AM   You routed this conversation to Carli Vee APRN CNS    Me        9:16 AM   Note      Patient phoning is for refills per plan. Will route to provider.      From 12/1/2017:  Treatment Plan:  Continue mirtazapine (Remeron) 30 mg at bedtime and amphetamine (Adderall) 20 mg twice daily.       Follow up 6 months. Phone for refills in 3 months.      - Recommend patient discuss medications with their pharmacist. Risks and benefits for medications were discussed including, but not limited to, side effects.   - Safety plan was reviewed; to the ER as needed or call after hours crisis line; [phone]  - Education and counseling was done regarding use of medications, psychotherapy options  - Call [phone] for appointment or to speak to a nurse.    -Office hours: Monday through Thursday 8:00 am to 4:30 pm; Friday 8:00 am to Noon.   - Patient received a copy of this Treatment Plan today.      Patient will continue to be seen for ongoing consultation and stabilization.          Signed:  Carli Vee RN, MS, CNS-BC

## 2018-05-29 NOTE — TELEPHONE ENCOUNTER
Reason for Call:  Other prescription    Detailed comments: Pt would like a call back to discuss questions her has regarding his medication.     Phone Number Patient can be reached at: Home number on file [phone] (home)    Best Time: anytime    Can we leave a detailed message on this number? YES    Call taken on 5/29/2018 at 9:20 AM by Cheryl Santacruz

## 2018-05-29 NOTE — TELEPHONE ENCOUNTER
Thank you,  Cordelia Snow - Pharmacy Technician  [facility]  [phone]

## 2018-05-31 ENCOUNTER — OFFICE VISIT (OUTPATIENT)
Dept: FAMILY MEDICINE | Facility: CLINIC | Age: 38
End: 2018-05-31
Payer: COMMERCIAL

## 2018-05-31 VITALS
HEART RATE: 92 BPM | WEIGHT: 187 LBS | DIASTOLIC BLOOD PRESSURE: 78 MMHG | SYSTOLIC BLOOD PRESSURE: 132 MMHG | TEMPERATURE: 98 F | BODY MASS INDEX: 24 KG/M2 | HEIGHT: 74 IN

## 2018-05-31 DIAGNOSIS — I10 BENIGN ESSENTIAL HYPERTENSION: ICD-10-CM

## 2018-05-31 DIAGNOSIS — F90.2 ATTENTION DEFICIT HYPERACTIVITY DISORDER (ADHD), COMBINED TYPE: Primary | ICD-10-CM

## 2018-05-31 PROCEDURE — 99213 OFFICE O/P EST LOW 20 MIN: CPT | Performed by: NURSE PRACTITIONER

## 2018-05-31 RX ORDER — DEXTROAMPHETAMINE SACCHARATE, AMPHETAMINE ASPARTATE, DEXTROAMPHETAMINE SULFATE AND AMPHETAMINE SULFATE 5; 5; 5; 5 MG/1; MG/1; MG/1; MG/1
20 TABLET ORAL 2 TIMES DAILY
Qty: 60 TABLET | Refills: 0 | Status: SHIPPED | OUTPATIENT
Start: 2018-06-29 | End: 2018-07-29

## 2018-05-31 RX ORDER — DEXTROAMPHETAMINE SACCHARATE, AMPHETAMINE ASPARTATE, DEXTROAMPHETAMINE SULFATE AND AMPHETAMINE SULFATE 5; 5; 5; 5 MG/1; MG/1; MG/1; MG/1
20 TABLET ORAL DAILY
Qty: 30 TABLET | Status: CANCELLED | OUTPATIENT
Start: 2018-08-01 | End: 2018-08-31

## 2018-05-31 RX ORDER — DEXTROAMPHETAMINE SACCHARATE, AMPHETAMINE ASPARTATE, DEXTROAMPHETAMINE SULFATE AND AMPHETAMINE SULFATE 5; 5; 5; 5 MG/1; MG/1; MG/1; MG/1
20 TABLET ORAL DAILY
Qty: 30 TABLET | Refills: 0 | Status: CANCELLED | OUTPATIENT
Start: 2018-05-31 | End: 2018-06-30

## 2018-05-31 RX ORDER — DEXTROAMPHETAMINE SACCHARATE, AMPHETAMINE ASPARTATE, DEXTROAMPHETAMINE SULFATE AND AMPHETAMINE SULFATE 5; 5; 5; 5 MG/1; MG/1; MG/1; MG/1
20 TABLET ORAL 2 TIMES DAILY
Qty: 60 TABLET | Refills: 0 | Status: SHIPPED | OUTPATIENT
Start: 2018-05-31 | End: 2018-08-31

## 2018-05-31 RX ORDER — DEXTROAMPHETAMINE SACCHARATE, AMPHETAMINE ASPARTATE, DEXTROAMPHETAMINE SULFATE AND AMPHETAMINE SULFATE 5; 5; 5; 5 MG/1; MG/1; MG/1; MG/1
20 TABLET ORAL DAILY
Qty: 30 TABLET | Refills: 0 | Status: CANCELLED | OUTPATIENT
Start: 2018-07-01 | End: 2018-07-31

## 2018-05-31 RX ORDER — DEXTROAMPHETAMINE SACCHARATE, AMPHETAMINE ASPARTATE, DEXTROAMPHETAMINE SULFATE AND AMPHETAMINE SULFATE 5; 5; 5; 5 MG/1; MG/1; MG/1; MG/1
20 TABLET ORAL 2 TIMES DAILY
Qty: 60 TABLET | Refills: 0 | Status: SHIPPED | OUTPATIENT
Start: 2018-07-31 | End: 2018-08-30

## 2018-05-31 ASSESSMENT — PATIENT HEALTH QUESTIONNAIRE - PHQ9: 5. POOR APPETITE OR OVEREATING: NOT AT ALL

## 2018-05-31 ASSESSMENT — ANXIETY QUESTIONNAIRES
GAD7 TOTAL SCORE: 0
5. BEING SO RESTLESS THAT IT IS HARD TO SIT STILL: NOT AT ALL
6. BECOMING EASILY ANNOYED OR IRRITABLE: NOT AT ALL
2. NOT BEING ABLE TO STOP OR CONTROL WORRYING: NOT AT ALL
7. FEELING AFRAID AS IF SOMETHING AWFUL MIGHT HAPPEN: NOT AT ALL
1. FEELING NERVOUS, ANXIOUS, OR ON EDGE: NOT AT ALL
3. WORRYING TOO MUCH ABOUT DIFFERENT THINGS: NOT AT ALL

## 2018-05-31 NOTE — PROGRESS NOTES
"  SUBJECTIVE:   Timmy Yousif is a 37 year old male who presents to clinic today for the following health issues:      ADHD Follow-Up    Date of last ADHD office visit: 12/1/2017  Status since last visit: Stable  Taking controlled (daily) medications as prescribed: Yes                       Parent/Patient Concerns with Medications: None  ADHD Medication     Amphetamines Disp Start End    amphetamine-dextroamphetamine (ADDERALL) 20 MG per tablet 60 tablet 2/28/2018     Sig - Route: Take 1 tablet (20 mg) by mouth 2 times daily - Oral    Class: Local Print    amphetamine-dextroamphetamine (ADDERALL) 20 MG per tablet 60 tablet 3/28/2018     Sig - Route: Take 1 tablet (20 mg) by mouth 2 times daily - Oral    Class: Local Print    amphetamine-dextroamphetamine (ADDERALL) 20 MG per tablet 60 tablet 4/28/2018     Sig - Route: Take 1 tablet (20 mg) by mouth 2 times daily - Oral    Class: Local Print          Sleep: no problems  Home/Family Concerns: None    Co-Morbid Diagnosis: Depression    Currently in counseling: Yes      Medication Benefits:   Controlled symptoms: Hyperactivity - motor restlessness, Attention span, Distractability, Finishing tasks, Impulse control and Frustration tolerance  Uncontrolled Symptoms: None    Medication side effects:  Side effects noted: none  Denies: appetite suppression, weight loss, insomnia, tics, palpitations, stomach ache, headache, emotional lability, rebound irritability, drowsiness, \"zombie\" effect, growth suppression and dry mouth    Blood pressure  Has not been taking Lisinopril for the past 6 months    Problem list and histories reviewed & adjusted, as indicated.  Additional history: as documented    Patient Active Problem List   Diagnosis     CARDIOVASCULAR SCREENING; LDL GOAL LESS THAN 160     Lumbar disc herniation with radiculopathy     ELISHA (obstructive sleep apnea)     Benign essential hypertension     History reviewed. No pertinent surgical history.    Social History " "  Substance Use Topics     Smoking status: Never Smoker     Smokeless tobacco: Never Used     Alcohol use Yes      Comment: occ     Family History   Problem Relation Age of Onset     CANCER Father      bladder cancer     Other Cancer Father      Bladder     DIABETES Mother      Type 2-managed by diet         Current Outpatient Prescriptions   Medication Sig Dispense Refill     amphetamine-dextroamphetamine (ADDERALL) 20 MG per tablet Take 1 tablet (20 mg) by mouth 2 times daily 60 tablet 0     amphetamine-dextroamphetamine (ADDERALL) 20 MG per tablet Take 1 tablet (20 mg) by mouth 2 times daily 60 tablet 0     amphetamine-dextroamphetamine (ADDERALL) 20 MG per tablet Take 1 tablet (20 mg) by mouth 2 times daily 60 tablet 0     lisinopril (PRINIVIL/ZESTRIL) 10 MG tablet Take 1 tablet (10 mg) by mouth daily 90 tablet 0     mirtazapine (REMERON) 30 MG tablet Take 1 tablet (30 mg) by mouth At Bedtime 30 tablet 11     Allergies   Allergen Reactions     Penicillin [Esters]      Labs reviewed in EPIC    Reviewed and updated as needed this visit by clinical staff       Reviewed and updated as needed this visit by Provider         ROS:  Constitutional, HEENT, cardiovascular, pulmonary, gi and gu systems are negative, except as otherwise noted.    OBJECTIVE:     /78 (Cuff Size: Adult Large)  Pulse 92  Temp 98  F (36.7  C) (Tympanic)  Ht 6' 2.25\" (1.886 m)  Wt 187 lb (84.8 kg)  BMI 23.85 kg/m2  Body mass index is 23.85 kg/(m^2).  GENERAL: healthy, alert and no distress  CV: regular rate and rhythm, normal S1 S2, no S3 or S4, no murmur  NEURO: Normal strength and tone, mentation intact and speech normal  PSYCH: mentation appears normal, affect normal/bright    Diagnostic Test Results:  none     ASSESSMENT/PLAN:     1. Attention deficit hyperactivity disorder (ADHD), combined type  Controlled.  Adderall refilled.  Follow up in 3 months.  - NEW PT ESTABLISHED ADHD 30 DAY RECHECK NOT INDICATED  - " amphetamine-dextroamphetamine (ADDERALL) 20 MG per tablet; Take 1 tablet (20 mg) by mouth 2 times daily  Dispense: 60 tablet; Refill: 0  - amphetamine-dextroamphetamine (ADDERALL) 20 MG per tablet; Take 1 tablet (20 mg) by mouth 2 times daily  Dispense: 60 tablet; Refill: 0  - amphetamine-dextroamphetamine (ADDERALL) 20 MG per tablet; Take 1 tablet (20 mg) by mouth 2 times daily  Dispense: 60 tablet; Refill: 0    2. Benign essential hypertension  Controlled.  Ok to stay off Lisinopril for now.  Will monitor.    Home care instructions were reviewed with the patient. The risks, benefits and treatment options of prescribed medications or other treatments have been discussed with the patient. The patient verbalized their understanding and should call or follow up if no improvement or if they develop further problems.    CARIE Morfin [facility]

## 2018-05-31 NOTE — LETTER
My Depression Action Plan  Name: Timmy Yousif   Date of Birth 1980  Date: 5/31/2018    My doctor: Jair Huffman   My clinic: [address] [id number] [phone]          GREEN    ZONE   Good Control    What it looks like:     Things are going generally well. You have normal up s and down s. You may even feel depressed from time to time, but bad moods usually last less than a day.   What you need to do:  1. Continue to care for yourself (see self care plan)  2. Check your depression survival kit and update it as needed  3. Follow your physician s recommendations including any medication.  4. Do not stop taking medication unless you consult with your physician first.           YELLOW         ZONE Getting Worse    What it looks like:     Depression is starting to interfere with your life.     It may be hard to get out of bed; you may be starting to isolate yourself from others.    Symptoms of depression are starting to last most all day and this has happened for several days.     You may have suicidal thoughts but they are not constant.   What you need to do:     1. Call your care team, your response to treatment will improve if you keep your care team informed of your progress. Yellow periods are signs an adjustment may need to be made.     2. Continue your self-care, even if you have to fake it!    3. Talk to someone in your support network    4. Open up your depression survival kit           RED    ZONE Medical Alert - Get Help    What it looks like:     Depression is seriously interfering with your life.     You may experience these or other symptoms: You can t get out of bed most days, can t work or engage in other necessary activities, you have trouble taking care of basic hygiene, or basic responsibilities, thoughts of suicide or death that will not go away, self-injurious behavior.     What you need to do:  1. Call your care team and  request a same-day appointment. If they are not available (weekends or after hours) call your local crisis line, emergency room or 911.            Depression Self Care Plan / Survival Kit    Self-Care for Depression  Here s the deal. Your body and mind are really not as separate as most people think.  What you do and think affects how you feel and how you feel influences what you do and think. This means if you do things that people who feel good do, it will help you feel better.  Sometimes this is all it takes.  There is also a place for medication and therapy depending on how severe your depression is, so be sure to consult with your medical provider and/ or Behavioral Health Consultant if your symptoms are worsening or not improving.     In order to better manage my stress, I will:    Exercise  Get some form of exercise, every day. This will help reduce pain and release endorphins, the  feel good  chemicals in your brain. This is almost as good as taking antidepressants!  This is not the same as joining a gym and then never going! (they count on that by the way ) It can be as simple as just going for a walk or doing some gardening, anything that will get you moving.      Hygiene   Maintain good hygiene (Get out of bed in the morning, Make your bed, Brush your teeth, Take a shower, and Get dressed like you were going to work, even if you are unemployed).  If your clothes don't fit try to get ones that do.    Diet  I will strive to eat foods that are good for me, drink plenty of water, and avoid excessive sugar, caffeine, alcohol, and other mood-altering substances.  Some foods that are helpful in depression are: complex carbohydrates, B vitamins, flaxseed, fish or fish oil, fresh fruits and vegetables.    Psychotherapy  I agree to participate in Individual Therapy (if recommended).    Medication  If prescribed medications, I agree to take them.  Missing doses can result in serious side effects.  I understand that  drinking alcohol, or other illicit drug use, may cause potential side effects.  I will not stop my medication abruptly without first discussing it with my provider.    Staying Connected With Others  I will stay in touch with my friends, family members, and my primary care provider/team.    Use your imagination  Be creative.  We all have a creative side; it doesn t matter if it s oil painting, sand castles, or mud pies! This will also kick up the endorphins.    Witness Beauty  (AKA stop and smell the roses) Take a look outside, even in mid-winter. Notice colors, textures. Watch the squirrels and birds.     Service to others  Be of service to others.  There is always someone else in need.  By helping others we can  get out of ourselves  and remember the really important things.  This also provides opportunities for practicing all the other parts of the program.    Humor  Laugh and be silly!  Adjust your TV habits for less news and crime-drama and more comedy.    Control your stress  Try breathing deep, massage therapy, biofeedback, and meditation. Find time to relax each day.     My support system    Clinic Contact:  Phone number:    Contact 1:  Phone number:    Contact 2:  Phone number:    Christianity/:  Phone number:    Therapist:  Phone number:    Local crisis center:    Phone number:    Other community support:  Phone number:

## 2018-05-31 NOTE — MR AVS SNAPSHOT
After Visit Summary   5/31/2018    Timmy Yousif    MRN: [id number]           Patient Information     Date Of Birth          1980        Visit Information        Provider Department      5/31/2018 7:40 AM Beatriz Krishna APRN CNP [facility]        Today's Diagnoses     Attention deficit hyperactivity disorder (ADHD), combined type    -  1    Benign essential hypertension           Follow-ups after your visit        Follow-up notes from your care team     Return in 3 months (on 8/31/2018) for ADHD MED RECHECK (3 MONTHS).      Who to contact     If you have questions or need follow up information about today's clinic visit or your schedule please contact [facility] directly at [phone].  Normal or non-critical lab and imaging results will be communicated to you by [employer], letter or phone within 4 business days after the clinic has received the results. If you do not hear from us within 7 days, please contact the clinic through [employer] or phone. If you have a critical or abnormal lab result, we will notify you by phone as soon as possible.  Submit refill requests through [employer] or call your pharmacy and they will forward the refill request to us. Please allow 3 business days for your refill to be completed.          Additional Information About Your Visit        MyChart Information     [employer] gives you secure access to your electronic health record. If you see a primary care provider, you can also send messages to your care team and make appointments. If you have questions, please call your primary care clinic.  If you do not have a primary care provider, please call [phone] and they will assist you.        Care EveryWhere ID     This is your Care EveryWhere ID. This could be used by other organizations to access your [facility] medical records  [id number]        Your Vitals Were     Pulse Temperature Height BMI (Body Mass Index)          92  "98  F (36.7  C) (Tympanic) 6' 2.25\" (1.886 m) 23.85 kg/m2         Blood Pressure from Last 3 Encounters:   05/31/18 132/78   12/01/17 (!) 156/97   07/28/17 151/89    Weight from Last 3 Encounters:   05/31/18 187 lb (84.8 kg)   12/01/17 197 lb 12.8 oz (89.7 kg)   07/28/17 189 lb 3.2 oz (85.8 kg)              We Performed the Following     DEPRESSION ACTION PLAN (DAP)     NEW PT ESTABLISHED ADHD 30 DAY RECHECK NOT INDICATED          Today's Medication Changes          These changes are accurate as of 5/31/18  8:15 AM.  If you have any questions, ask your nurse or doctor.               These medicines have changed or have updated prescriptions.        Dose/Directions    * amphetamine-dextroamphetamine 20 MG per tablet   Commonly known as:  ADDERALL   This may have changed:  Another medication with the same name was changed. Make sure you understand how and when to take each.   Used for:  Attention deficit hyperactivity disorder (ADHD), combined type   Changed by:  Beatriz Krishna APRN CNP        Dose:  20 mg   Take 1 tablet (20 mg) by mouth 2 times daily   Quantity:  60 tablet   Refills:  0       * amphetamine-dextroamphetamine 20 MG per tablet   Commonly known as:  ADDERALL   This may have changed:  These instructions start on 6/29/2018. If you are unsure what to do until then, ask your doctor or other care provider.   Used for:  Attention deficit hyperactivity disorder (ADHD), combined type   Changed by:  Beatriz Krishna APRN CNP        Dose:  20 mg   Start taking on:  6/29/2018   Take 1 tablet (20 mg) by mouth 2 times daily   Quantity:  60 tablet   Refills:  0       * amphetamine-dextroamphetamine 20 MG per tablet   Commonly known as:  ADDERALL   This may have changed:  These instructions start on 7/31/2018. If you are unsure what to do until then, ask your doctor or other care provider.   Used for:  Attention deficit hyperactivity disorder (ADHD), combined type   Changed by:  Beatriz Krishna, " APRN CNP        Dose:  20 mg   Start taking on:  7/31/2018   Take 1 tablet (20 mg) by mouth 2 times daily   Quantity:  60 tablet   Refills:  0       * Notice:  This list has 3 medication(s) that are the same as other medications prescribed for you. Read the directions carefully, and ask your doctor or other care provider to review them with you.      Stop taking these medicines if you haven't already. Please contact your care team if you have questions.     lisinopril 10 MG tablet   Commonly known as:  PRINIVIL/ZESTRIL   Stopped by:  Beatriz Krishna APRN CNP                Where to get your medicines      Some of these will need a paper prescription and others can be bought over the counter.  Ask your nurse if you have questions.     Bring a paper prescription for each of these medications     amphetamine-dextroamphetamine 20 MG per tablet    amphetamine-dextroamphetamine 20 MG per tablet    amphetamine-dextroamphetamine 20 MG per tablet                Primary Care Provider Office Phone # Fax #    Jair Huffman PA-C [phone] [address] [employer], [place] [id number]        Equal Access to Services     [facility]: Hadii saroj ku hadasho Solucia, waaxda luqadaha, qaybta kaalmada adedamonyabobo, collette lorenzo . So [facility] [phone].    ATENCIÓN: Si habla español, tiene a logan disposición servicios gratuitos de asistencia lingüística. Llame al [phone].    We comply with applicable federal civil rights laws and [place] laws. We do not discriminate on the basis of race, color, national origin, age, disability, sex, sexual orientation, or gender identity.            Thank you!     Thank you for choosing [facility]  for your care. Our goal is always to provide you with excellent care. Hearing back from our patients is one way we can continue to improve our services. Please take a few minutes to complete the written survey that you may receive in the  mail after your visit with us. Thank you!             Your Updated Medication List - Protect others around you: Learn how to safely use, store and throw away your medicines at www.disposemymeds.org.          This list is accurate as of 5/31/18  8:15 AM.  Always use your most recent med list.                   Brand Name Dispense Instructions for use Diagnosis    * amphetamine-dextroamphetamine 20 MG per tablet    ADDERALL    60 tablet    Take 1 tablet (20 mg) by mouth 2 times daily    Attention deficit hyperactivity disorder (ADHD), combined type       * amphetamine-dextroamphetamine 20 MG per tablet   Start taking on:  6/29/2018    ADDERALL    60 tablet    Take 1 tablet (20 mg) by mouth 2 times daily    Attention deficit hyperactivity disorder (ADHD), combined type       * amphetamine-dextroamphetamine 20 MG per tablet   Start taking on:  7/31/2018    ADDERALL    60 tablet    Take 1 tablet (20 mg) by mouth 2 times daily    Attention deficit hyperactivity disorder (ADHD), combined type       mirtazapine 30 MG tablet    REMERON    30 tablet    Take 1 tablet (30 mg) by mouth At Bedtime    Major depressive disorder, recurrent episode, moderate (H)       * Notice:  This list has 3 medication(s) that are the same as other medications prescribed for you. Read the directions carefully, and ask your doctor or other care provider to review them with you.

## 2018-06-01 ASSESSMENT — ANXIETY QUESTIONNAIRES: GAD7 TOTAL SCORE: 0

## 2018-06-01 ASSESSMENT — PATIENT HEALTH QUESTIONNAIRE - PHQ9: SUM OF ALL RESPONSES TO PHQ QUESTIONS 1-9: 0

## 2018-06-03 ENCOUNTER — OFFICE VISIT (OUTPATIENT)
Dept: URGENT CARE | Facility: URGENT CARE | Age: 38
End: 2018-06-03
Payer: COMMERCIAL

## 2018-06-03 VITALS
BODY MASS INDEX: 23.98 KG/M2 | WEIGHT: 188 LBS | HEART RATE: 117 BPM | OXYGEN SATURATION: 99 % | DIASTOLIC BLOOD PRESSURE: 91 MMHG | SYSTOLIC BLOOD PRESSURE: 148 MMHG | TEMPERATURE: 97.9 F
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
## 2018-06-03 PROCEDURE — 99213 OFFICE O/P EST LOW 20 MIN: CPT | Performed by: FAMILY MEDICINE

## 2018-06-03 RX ORDER — DOXYCYCLINE 100 MG/1
100 CAPSULE ORAL 2 TIMES DAILY
Qty: 28 CAPSULE | Refills: 0 | Status: SHIPPED | OUTPATIENT
Start: 2018-06-03 | End: 2018-06-17

## 2018-06-03 NOTE — MR AVS SNAPSHOT
After Visit Summary   6/3/2018    Timmy Yousif    MRN: [id number]           Patient Information     Date Of Birth          1980        Visit Information        Provider Department      6/3/2018 12:35 PM Tashia Reyes MD [facility]        Today's Diagnoses     Tick bite, initial encounter    -  1       Follow-ups after your visit        Who to contact     If you have questions or need follow up information about today's clinic visit or your schedule please contact [facility] directly at [phone].  Normal or non-critical lab and imaging results will be communicated to you by [employer]hart, letter or phone within 4 business days after the clinic has received the results. If you do not hear from us within 7 days, please contact the clinic through "[employer]t or phone. If you have a critical or abnormal lab result, we will notify you by phone as soon as possible.  Submit refill requests through Attune or call your pharmacy and they will forward the refill request to us. Please allow 3 business days for your refill to be completed.          Additional Information About Your Visit        MyChart Information     [employer] gives you secure access to your electronic health record. If you see a primary care provider, you can also send messages to your care team and make appointments. If you have questions, please call your primary care clinic.  If you do not have a primary care provider, please call [phone] and they will assist you.        Care EveryWhere ID     This is your Care EveryWhere ID. This could be used by other organizations to access your [place] medical records  [id number]        Your Vitals Were     Pulse Temperature Pulse Oximetry BMI (Body Mass Index)          117 97.9  F (36.6  C) (Tympanic) 99% 23.98 kg/m2         Blood Pressure from Last 3 Encounters:   06/03/18 (!) 148/91   05/31/18 132/78   12/01/17 (!) 156/97    Weight from Last 3 Encounters:   06/03/18  188 lb (85.3 kg)   05/31/18 187 lb (84.8 kg)   12/01/17 197 lb 12.8 oz (89.7 kg)              Today, you had the following     No orders found for display         Today's Medication Changes          These changes are accurate as of 6/3/18  2:32 PM.  If you have any questions, ask your nurse or doctor.               Start taking these medicines.        Dose/Directions    doxycycline monohydrate 100 MG capsule   Used for:  Tick bite, initial encounter        Dose:  100 mg   Take 1 capsule (100 mg) by mouth 2 times daily for 14 days   Quantity:  28 capsule   Refills:  0            Where to get your medicines      These medications were sent to [facility] [id number] [place], [place] - [address]  [address] [facility] [address]     Phone:  [phone]     doxycycline monohydrate 100 MG capsule                Primary Care Provider Office Phone # Fax #    Jair Huffman PA-C [phone] [address] [address]        Equal Access to Services     ZELALEM KIRKPATRICK : Hadii aad ku hadasho Soomaali, waaxda luqadaha, qaybta kaalmada adeegyada, waxay idiin hayjosen viki khamanda lorenzo . So [facility] [phone].    ATENCIÓN: Si habla español, tiene a logan disposición servicios gratuitos de asistencia lingüística. Llame al [phone].    We comply with applicable federal civil rights laws and [place] laws. We do not discriminate on the basis of race, color, national origin, age, disability, sex, sexual orientation, or gender identity.            Thank you!     Thank you for choosing [facility]  for your care. Our goal is always to provide you with excellent care. Hearing back from our patients is one way we can continue to improve our services. Please take a few minutes to complete the written survey that you may receive in the mail after your visit with us. Thank you!             Your Updated Medication List - Protect others around you: Learn how to safely use, store  and throw away your medicines at www.disposemymeds.org.          This list is accurate as of 6/3/18  2:32 PM.  Always use your most recent med list.                   Brand Name Dispense Instructions for use Diagnosis    * amphetamine-dextroamphetamine 20 MG per tablet    ADDERALL    60 tablet    Take 1 tablet (20 mg) by mouth 2 times daily    Attention deficit hyperactivity disorder (ADHD), combined type       * amphetamine-dextroamphetamine 20 MG per tablet   Start taking on:  6/29/2018    ADDERALL    60 tablet    Take 1 tablet (20 mg) by mouth 2 times daily    Attention deficit hyperactivity disorder (ADHD), combined type       * amphetamine-dextroamphetamine 20 MG per tablet   Start taking on:  7/31/2018    ADDERALL    60 tablet    Take 1 tablet (20 mg) by mouth 2 times daily    Attention deficit hyperactivity disorder (ADHD), combined type       doxycycline monohydrate 100 MG capsule     28 capsule    Take 1 capsule (100 mg) by mouth 2 times daily for 14 days    Tick bite, initial encounter       mirtazapine 30 MG tablet    REMERON    30 tablet    Take 1 tablet (30 mg) by mouth At Bedtime    Major depressive disorder, recurrent episode, moderate (H)       * Notice:  This list has 3 medication(s) that are the same as other medications prescribed for you. Read the directions carefully, and ask your doctor or other care provider to review them with you.

## 2018-06-03 NOTE — PROGRESS NOTES
SUBJECTIVE:  Timmy Yousif is a 37 year old male who is here because of a redness and concerns of tick bite on the left leg  Location: on his left leg  Onset was how long ago?: noticed it this morning  Associated symptoms?: red and central clearing (has ticks in the back yard, has found three) does not recall if there was a tick on his leg or not.  Took a shower last night, doesn't remember looking at his leg.   It has not spread.  Rash is sudden onset.  .  Patient has not had similar rash in the past.  Initial treatment: Patient has tried nothing   Other symptoms: No  Other Recent illnesses: None      ROS:  5-Point Review of Systems Negative-- Except as stated above.    EXAM:   BP (!) 148/91  Pulse 117  Temp 97.9  F (36.6  C) (Tympanic)  Wt 188 lb (85.3 kg)  SpO2 99%  BMI 23.98 kg/m2  GENERAL: alert, no acute distress.  SKIN: There is a central lesion with surrounding erythema noted on his left lower leg about 4 cm in width, no induration or infection noted.      ASSESSMENT/PLAN:  (W57.XXXA) Tick bite, initial encounter  (primary encounter diagnosis)  Comment: discussed pros and cons of prophylaxis and prevention treatment.   Plan: doxycycline monohydrate 100 MG capsule (patient chose to have the abx risks vs benefits)        F/U with PCP as needed.  Educated in detail transmission, spread and etiology of lymes disease.

## 2018-08-31 ENCOUNTER — OFFICE VISIT (OUTPATIENT)
Dept: FAMILY MEDICINE | Facility: CLINIC | Age: 38
End: 2018-08-31
Payer: COMMERCIAL

## 2018-08-31 VITALS
WEIGHT: 175 LBS | RESPIRATION RATE: 16 BRPM | BODY MASS INDEX: 22.46 KG/M2 | HEART RATE: 100 BPM | TEMPERATURE: 98.2 F | DIASTOLIC BLOOD PRESSURE: 70 MMHG | HEIGHT: 74 IN | SYSTOLIC BLOOD PRESSURE: 124 MMHG

## 2018-08-31 DIAGNOSIS — F90.2 ATTENTION DEFICIT HYPERACTIVITY DISORDER (ADHD), COMBINED TYPE: ICD-10-CM

## 2018-08-31 DIAGNOSIS — F11.21 OPIOID DEPENDENCE IN REMISSION (H): Primary | ICD-10-CM

## 2018-08-31 PROCEDURE — 99213 OFFICE O/P EST LOW 20 MIN: CPT | Performed by: NURSE PRACTITIONER

## 2018-08-31 PROCEDURE — 99000 SPECIMEN HANDLING OFFICE-LAB: CPT | Performed by: NURSE PRACTITIONER

## 2018-08-31 PROCEDURE — 80307 DRUG TEST PRSMV CHEM ANLYZR: CPT | Mod: 90 | Performed by: NURSE PRACTITIONER

## 2018-08-31 RX ORDER — BUPRENORPHINE AND NALOXONE 8; 2 MG/1; MG/1
2.5 FILM, SOLUBLE BUCCAL; SUBLINGUAL
COMMUNITY
Start: 2018-08-13 | End: 2019-02-28

## 2018-08-31 RX ORDER — DEXTROAMPHETAMINE SACCHARATE, AMPHETAMINE ASPARTATE, DEXTROAMPHETAMINE SULFATE AND AMPHETAMINE SULFATE 5; 5; 5; 5 MG/1; MG/1; MG/1; MG/1
20 TABLET ORAL 2 TIMES DAILY
Qty: 60 TABLET | Refills: 0 | Status: CANCELLED | OUTPATIENT
Start: 2018-08-31 | End: 2018-09-30

## 2018-08-31 RX ORDER — DEXTROAMPHETAMINE SACCHARATE, AMPHETAMINE ASPARTATE, DEXTROAMPHETAMINE SULFATE AND AMPHETAMINE SULFATE 5; 5; 5; 5 MG/1; MG/1; MG/1; MG/1
20 TABLET ORAL 2 TIMES DAILY
Qty: 60 TABLET | Refills: 0 | Status: SHIPPED | OUTPATIENT
Start: 2018-11-01 | End: 2019-02-28

## 2018-08-31 RX ORDER — DEXTROAMPHETAMINE SACCHARATE, AMPHETAMINE ASPARTATE, DEXTROAMPHETAMINE SULFATE AND AMPHETAMINE SULFATE 5; 5; 5; 5 MG/1; MG/1; MG/1; MG/1
20 TABLET ORAL 2 TIMES DAILY
Qty: 60 TABLET | Refills: 0 | Status: SHIPPED | OUTPATIENT
Start: 2018-10-01 | End: 2019-02-28

## 2018-08-31 RX ORDER — POLYETHYLENE GLYCOL 3350 17 G/17G
17 POWDER, FOR SOLUTION ORAL
COMMUNITY
Start: 2018-06-08 | End: 2018-12-03

## 2018-08-31 RX ORDER — DEXTROAMPHETAMINE SACCHARATE, AMPHETAMINE ASPARTATE, DEXTROAMPHETAMINE SULFATE AND AMPHETAMINE SULFATE 5; 5; 5; 5 MG/1; MG/1; MG/1; MG/1
20 TABLET ORAL 2 TIMES DAILY
Qty: 60 TABLET | Refills: 0 | Status: SHIPPED | OUTPATIENT
Start: 2018-08-31 | End: 2018-12-03

## 2018-08-31 NOTE — MR AVS SNAPSHOT
After Visit Summary   8/31/2018    Timmy Yousif    MRN: [id number]           Patient Information     Date Of Birth          1980        Visit Information        Provider Department      8/31/2018 9:40 AM Beatriz Krishna APRN CNP [facility]        Today's Diagnoses     Opioid dependence in remission (H)    -  1    Attention deficit hyperactivity disorder (ADHD), combined type           Follow-ups after your visit        Follow-up notes from your care team     Return in about 3 months (around 11/30/2018).      Who to contact     If you have questions or need follow up information about today's clinic visit or your schedule please contact [facility] directly at [phone].  Normal or non-critical lab and imaging results will be communicated to you by [employer], letter or phone within 4 business days after the clinic has received the results. If you do not hear from us within 7 days, please contact the clinic through [employer] or phone. If you have a critical or abnormal lab result, we will notify you by phone as soon as possible.  Submit refill requests through [employer] or call your pharmacy and they will forward the refill request to us. Please allow 3 business days for your refill to be completed.          Additional Information About Your Visit        MyChart Information     [employer] gives you secure access to your electronic health record. If you see a primary care provider, you can also send messages to your care team and make appointments. If you have questions, please call your primary care clinic.  If you do not have a primary care provider, please call [phone] and they will assist you.        Care EveryWhere ID     This is your Care EveryWhere ID. This could be used by other organizations to access your [place] medical records  [id number]        Your Vitals Were     Pulse Temperature Respirations Height BMI (Body Mass Index)       100 98.2  " F (36.8  C) (Tympanic) 16 6' 2.25\" (1.886 m) 22.32 kg/m2        Blood Pressure from Last 3 Encounters:   08/31/18 124/70   06/03/18 (!) 148/91   05/31/18 132/78    Weight from Last 3 Encounters:   08/31/18 175 lb (79.4 kg)   06/03/18 188 lb (85.3 kg)   05/31/18 187 lb (84.8 kg)              We Performed the Following     Drug  Screen Comprehensive , Urine with Reported Meds (MedTox) (Pain Care Package)          Today's Medication Changes          These changes are accurate as of 8/31/18 10:38 AM.  If you have any questions, ask your nurse or doctor.               Start taking these medicines.        Dose/Directions    * amphetamine-dextroamphetamine 20 MG per tablet   Commonly known as:  ADDERALL   Used for:  Attention deficit hyperactivity disorder (ADHD), combined type   Started by:  Beatriz Krishna APRN CNP        Dose:  20 mg   Take 1 tablet (20 mg) by mouth 2 times daily   Quantity:  60 tablet   Refills:  0       * amphetamine-dextroamphetamine 20 MG per tablet   Commonly known as:  ADDERALL   Used for:  Attention deficit hyperactivity disorder (ADHD), combined type   Started by:  Beatriz Krishna APRN CNP        Dose:  20 mg   Start taking on:  10/1/2018   Take 1 tablet (20 mg) by mouth 2 times daily   Quantity:  60 tablet   Refills:  0       * amphetamine-dextroamphetamine 20 MG per tablet   Commonly known as:  ADDERALL   Used for:  Attention deficit hyperactivity disorder (ADHD), combined type   Started by:  Beatriz Krishna APRN CNP        Dose:  20 mg   Start taking on:  11/1/2018   Take 1 tablet (20 mg) by mouth 2 times daily   Quantity:  60 tablet   Refills:  0       * Notice:  This list has 3 medication(s) that are the same as other medications prescribed for you. Read the directions carefully, and ask your doctor or other care provider to review them with you.         Where to get your medicines      Some of these will need a paper prescription and others can be bought over the " counter.  Ask your nurse if you have questions.     Bring a paper prescription for each of these medications     amphetamine-dextroamphetamine 20 MG per tablet    amphetamine-dextroamphetamine 20 MG per tablet    amphetamine-dextroamphetamine 20 MG per tablet                Primary Care Provider Office Phone # Fax #    [facility] [phone] [address] [place] [address]        Equal Access to Services     MARCUS KIRKPATRICK : Hadii aad ku hadasho Soomaali, waaxda luqadaha, qaybta kaalmada adeegyada, waxay idiin hayaan adeeg janegentryerica lazbigniew ah. So [facility] [phone].    ATENCIÓN: Si habla español, tiene a logan disposición servicios gratuitos de asistencia lingüística. Llame al [phone].    We comply with applicable federal civil rights laws and [place] laws. We do not discriminate on the basis of race, color, national origin, age, disability, sex, sexual orientation, or gender identity.            Thank you!     Thank you for choosing [facility]  for your care. Our goal is always to provide you with excellent care. Hearing back from our patients is one way we can continue to improve our services. Please take a few minutes to complete the written survey that you may receive in the mail after your visit with us. Thank you!             Your Updated Medication List - Protect others around you: Learn how to safely use, store and throw away your medicines at www.disposemymeds.org.          This list is accurate as of 8/31/18 10:38 AM.  Always use your most recent med list.                   Brand Name Dispense Instructions for use Diagnosis    * amphetamine-dextroamphetamine 20 MG per tablet    ADDERALL    60 tablet    Take 1 tablet (20 mg) by mouth 2 times daily    Attention deficit hyperactivity disorder (ADHD), combined type       * amphetamine-dextroamphetamine 20 MG per tablet   Start taking on:  10/1/2018    ADDERALL    60 tablet    Take 1 tablet (20 mg)  by mouth 2 times daily    Attention deficit hyperactivity disorder (ADHD), combined type       * amphetamine-dextroamphetamine 20 MG per tablet   Start taking on:  11/1/2018    ADDERALL    60 tablet    Take 1 tablet (20 mg) by mouth 2 times daily    Attention deficit hyperactivity disorder (ADHD), combined type       buprenorphine HCl-naloxone HCl 8-2 MG per film    SUBOXONE     Place 2.5 Film under the tongue    Attention deficit hyperactivity disorder (ADHD), combined type       mirtazapine 30 MG tablet    REMERON    30 tablet    Take 1 tablet (30 mg) by mouth At Bedtime    Major depressive disorder, recurrent episode, moderate (H)       polyethylene glycol powder    MIRALAX/GLYCOLAX     Take 17 g by mouth    Attention deficit hyperactivity disorder (ADHD), combined type       * Notice:  This list has 3 medication(s) that are the same as other medications prescribed for you. Read the directions carefully, and ask your doctor or other care provider to review them with you.

## 2018-08-31 NOTE — PROGRESS NOTES
"  SUBJECTIVE:   Timmy Yousif is a 37 year old male who presents to clinic today for the following health issues:    ADHD Follow-Up    Date of last ADHD office visit: 5/31/20185  Status since last visit: Stable  Taking controlled (daily) medications as prescribed: Yes                       Parent/Patient Concerns with Medications: None  ADHD Medication     Amphetamines Disp Start End    amphetamine-dextroamphetamine (ADDERALL) 20 MG per tablet 60 tablet 10/1/2018 10/31/2018    Sig - Route: Take 1 tablet (20 mg) by mouth 2 times daily - Oral    Class: Local Print    amphetamine-dextroamphetamine (ADDERALL) 20 MG per tablet 60 tablet 11/1/2018 12/1/2018    Sig - Route: Take 1 tablet (20 mg) by mouth 2 times daily - Oral    Class: Local Print    amphetamine-dextroamphetamine (ADDERALL) 20 MG per tablet 60 tablet 8/31/2018     Sig - Route: Take 1 tablet (20 mg) by mouth 2 times daily - Oral    Class: Local Print        Sleep: no problems  Home/Family Concerns: Stable    Currently in counseling: Yes-3 times weekly for opioid abuse  On Suboxone  States started with chronic low back pain and got out of control-decide to get help this spring  Getting drug tested every two weeks and meeting with provider  Things are going really well-[place] and three daughters 2, 4, 5 YO  Timmy has been fixing up the house    Medication Benefits:   Controlled symptoms: Attention span, Distractability, Finishing tasks and Impulse control  Uncontrolled Symptoms: None    Medication side effects:  Side effects noted: none  Denies: appetite suppression, weight loss, insomnia, tics, palpitations, stomach ache, headache, emotional lability, rebound irritability, drowsiness, \"zombie\" effect, growth suppression and dry mouth    Problem list and histories reviewed & adjusted, as indicated.  Additional history: as documented    Patient Active Problem List   Diagnosis     CARDIOVASCULAR SCREENING; LDL GOAL LESS " "THAN 160     Lumbar disc herniation with radiculopathy     ELISHA (obstructive sleep apnea)     Benign essential hypertension     History reviewed. No pertinent surgical history.    Social History   Substance Use Topics     Smoking status: Never Smoker     Smokeless tobacco: Never Used     Alcohol use Yes      Comment: occ     Family History   Problem Relation Age of Onset     Cancer Father      bladder cancer     Other Cancer Father      Bladder     Diabetes Mother      Type 2-managed by diet         Current Outpatient Prescriptions   Medication Sig Dispense Refill     [START ON 10/1/2018] amphetamine-dextroamphetamine (ADDERALL) 20 MG per tablet Take 1 tablet (20 mg) by mouth 2 times daily 60 tablet 0     [START ON 11/1/2018] amphetamine-dextroamphetamine (ADDERALL) 20 MG per tablet Take 1 tablet (20 mg) by mouth 2 times daily 60 tablet 0     amphetamine-dextroamphetamine (ADDERALL) 20 MG per tablet Take 1 tablet (20 mg) by mouth 2 times daily 60 tablet 0     buprenorphine HCl-naloxone HCl (SUBOXONE) 8-2 MG per film Place 2.5 Film under the tongue       mirtazapine (REMERON) 30 MG tablet Take 1 tablet (30 mg) by mouth At Bedtime 30 tablet 11     polyethylene glycol (MIRALAX/GLYCOLAX) powder Take 17 g by mouth       Allergies   Allergen Reactions     Penicillin [Penicillins]      Labs reviewed in EPIC    Reviewed and updated as needed this visit by clinical staff  Tobacco  Allergies  Meds  Med Hx  Surg Hx  Fam Hx  Soc Hx      Reviewed and updated as needed this visit by Provider         ROS:  Constitutional, HEENT, cardiovascular, pulmonary, gi and gu systems are negative, except as otherwise noted.    OBJECTIVE:     /70 (Cuff Size: Adult Regular)  Pulse 100  Temp 98.2  F (36.8  C) (Tympanic)  Resp 16  Ht 6' 2.25\" (1.886 m)  Wt 175 lb (79.4 kg)  BMI 22.32 kg/m2  Body mass index is 22.32 kg/(m^2).  GENERAL: healthy, alert and no distress  RESP: lungs clear to auscultation - no rales, rhonchi or " wheezes  CV: regular rate and rhythm, normal S1 S2, no S3 or S4, no murmur, click or rub, no peripheral edema and peripheral pulses strong  NEURO: Normal strength and tone, mentation intact and speech normal  PSYCH: mentation appears normal, affect normal/bright    Diagnostic Test Results:  none     ASSESSMENT/PLAN:     1. Attention deficit hyperactivity disorder (ADHD), combined type  Amphetamines negative in past 2 drug screens at [facility]-comprehensive drug screen sent.  Timmy did take this Adderall this morning and reports daily use.  - amphetamine-dextroamphetamine (ADDERALL) 20 MG per tablet; Take 1 tablet (20 mg) by mouth 2 times daily  Dispense: 60 tablet; Refill: 0  - amphetamine-dextroamphetamine (ADDERALL) 20 MG per tablet; Take 1 tablet (20 mg) by mouth 2 times daily  Dispense: 60 tablet; Refill: 0  - buprenorphine HCl-naloxone HCl (SUBOXONE) 8-2 MG per film; Place 2.5 Film under the tongue  - polyethylene glycol (MIRALAX/GLYCOLAX) powder; Take 17 g by mouth  - amphetamine-dextroamphetamine (ADDERALL) 20 MG per tablet; Take 1 tablet (20 mg) by mouth 2 times daily  Dispense: 60 tablet; Refill: 0  - Drug  Screen Comprehensive , Urine with Reported Meds (MedTox) (Pain Care Package)    2. Opioid dependence in remission (H)  Stable-currently in ongoing treatment at [facility] and on Suboxone.    Home care instructions were reviewed with the patient. The risks, benefits and treatment options of prescribed medications or other treatments have been discussed with the patient. The patient verbalized their understanding and should call or follow up if no improvement or if they develop further problems.    CARIE Morfin [facility]

## 2018-09-05 LAB — PAIN DRUG SCR UR W RPTD MEDS: NORMAL

## 2018-09-11 ENCOUNTER — OFFICE VISIT (OUTPATIENT)
Dept: FAMILY MEDICINE | Facility: CLINIC | Age: 38
End: 2018-09-11
Payer: COMMERCIAL

## 2018-09-11 VITALS
SYSTOLIC BLOOD PRESSURE: 154 MMHG | OXYGEN SATURATION: 98 % | DIASTOLIC BLOOD PRESSURE: 86 MMHG | TEMPERATURE: 100.2 F | HEART RATE: 57 BPM | BODY MASS INDEX: 22.96 KG/M2 | WEIGHT: 180 LBS

## 2018-09-11 DIAGNOSIS — R63.4 LOSS OF WEIGHT: ICD-10-CM

## 2018-09-11 DIAGNOSIS — D64.9 ANEMIA, UNSPECIFIED TYPE: ICD-10-CM

## 2018-09-11 DIAGNOSIS — M25.50 MULTIPLE JOINT PAIN: Primary | ICD-10-CM

## 2018-09-11 DIAGNOSIS — R51.9 ACUTE NONINTRACTABLE HEADACHE, UNSPECIFIED HEADACHE TYPE: ICD-10-CM

## 2018-09-11 DIAGNOSIS — R07.0 THROAT PAIN: ICD-10-CM

## 2018-09-11 LAB
ALBUMIN SERPL-MCNC: 3.7 G/DL (ref 3.4–5)
ALP SERPL-CCNC: 68 U/L (ref 40–150)
ALT SERPL W P-5'-P-CCNC: 75 U/L (ref 0–70)
ANION GAP SERPL CALCULATED.3IONS-SCNC: 5 MMOL/L (ref 3–14)
AST SERPL W P-5'-P-CCNC: 60 U/L (ref 0–45)
BASOPHILS # BLD AUTO: 0 10E9/L (ref 0–0.2)
BASOPHILS NFR BLD AUTO: 0.4 %
BILIRUB SERPL-MCNC: 0.2 MG/DL (ref 0.2–1.3)
BUN SERPL-MCNC: 10 MG/DL (ref 7–30)
CALCIUM SERPL-MCNC: 8.2 MG/DL (ref 8.5–10.1)
CHLORIDE SERPL-SCNC: 107 MMOL/L (ref 94–109)
CO2 SERPL-SCNC: 29 MMOL/L (ref 20–32)
CREAT SERPL-MCNC: 0.82 MG/DL (ref 0.66–1.25)
CRP SERPL-MCNC: 51.6 MG/L (ref 0–8)
DEPRECATED S PYO AG THROAT QL EIA: NORMAL
DIFFERENTIAL METHOD BLD: ABNORMAL
EOSINOPHIL # BLD AUTO: 0 10E9/L (ref 0–0.7)
EOSINOPHIL NFR BLD AUTO: 0.7 %
ERYTHROCYTE [DISTWIDTH] IN BLOOD BY AUTOMATED COUNT: 14.1 % (ref 10–15)
ERYTHROCYTE [SEDIMENTATION RATE] IN BLOOD BY WESTERGREN METHOD: 8 MM/H (ref 0–15)
GFR SERPL CREATININE-BSD FRML MDRD: >90 ML/MIN/1.7M2
GLUCOSE SERPL-MCNC: 112 MG/DL (ref 70–99)
HCT VFR BLD AUTO: 37.1 % (ref 40–53)
HGB BLD-MCNC: 12.3 G/DL (ref 13.3–17.7)
LYMPHOCYTES # BLD AUTO: 0.6 10E9/L (ref 0.8–5.3)
LYMPHOCYTES NFR BLD AUTO: 11.3 %
MCH RBC QN AUTO: 30.3 PG (ref 26.5–33)
MCHC RBC AUTO-ENTMCNC: 33.2 G/DL (ref 31.5–36.5)
MCV RBC AUTO: 91 FL (ref 78–100)
MONOCYTES # BLD AUTO: 0.5 10E9/L (ref 0–1.3)
MONOCYTES NFR BLD AUTO: 8.9 %
NEUTROPHILS # BLD AUTO: 4.3 10E9/L (ref 1.6–8.3)
NEUTROPHILS NFR BLD AUTO: 78.7 %
PLATELET # BLD AUTO: 253 10E9/L (ref 150–450)
POTASSIUM SERPL-SCNC: 3.8 MMOL/L (ref 3.4–5.3)
PROT SERPL-MCNC: 6.7 G/DL (ref 6.8–8.8)
RBC # BLD AUTO: 4.06 10E12/L (ref 4.4–5.9)
SODIUM SERPL-SCNC: 141 MMOL/L (ref 133–144)
SPECIMEN SOURCE: NORMAL
WBC # BLD AUTO: 5.4 10E9/L (ref 4–11)

## 2018-09-11 PROCEDURE — 80053 COMPREHEN METABOLIC PANEL: CPT | Performed by: PHYSICIAN ASSISTANT

## 2018-09-11 PROCEDURE — 86038 ANTINUCLEAR ANTIBODIES: CPT | Performed by: PHYSICIAN ASSISTANT

## 2018-09-11 PROCEDURE — 87880 STREP A ASSAY W/OPTIC: CPT | Performed by: PHYSICIAN ASSISTANT

## 2018-09-11 PROCEDURE — 85652 RBC SED RATE AUTOMATED: CPT | Performed by: PHYSICIAN ASSISTANT

## 2018-09-11 PROCEDURE — 99214 OFFICE O/P EST MOD 30 MIN: CPT | Performed by: PHYSICIAN ASSISTANT

## 2018-09-11 PROCEDURE — 86140 C-REACTIVE PROTEIN: CPT | Performed by: PHYSICIAN ASSISTANT

## 2018-09-11 PROCEDURE — 86431 RHEUMATOID FACTOR QUANT: CPT | Performed by: PHYSICIAN ASSISTANT

## 2018-09-11 PROCEDURE — 86618 LYME DISEASE ANTIBODY: CPT | Performed by: PHYSICIAN ASSISTANT

## 2018-09-11 PROCEDURE — 87081 CULTURE SCREEN ONLY: CPT | Performed by: PHYSICIAN ASSISTANT

## 2018-09-11 PROCEDURE — 85025 COMPLETE CBC W/AUTO DIFF WBC: CPT | Performed by: PHYSICIAN ASSISTANT

## 2018-09-11 PROCEDURE — 36415 COLL VENOUS BLD VENIPUNCTURE: CPT | Performed by: PHYSICIAN ASSISTANT

## 2018-09-11 RX ORDER — DOXYCYCLINE 100 MG/1
100 CAPSULE ORAL 2 TIMES DAILY
Qty: 28 CAPSULE | Refills: 0 | Status: SHIPPED | OUTPATIENT
Start: 2018-09-11 | End: 2019-02-28

## 2018-09-11 NOTE — MR AVS SNAPSHOT
After Visit Summary   9/11/2018    Timmy Yousif    MRN: [id number]           Patient Information     Date Of Birth          1980        Visit Information        Provider Department      9/11/2018 12:20 PM Brittney Mancia PA-C [facility]        Today's Diagnoses     Multiple joint pain    -  1    Acute nonintractable headache, unspecified headache type        Loss of weight        Throat pain        Anemia, unspecified type           Follow-ups after your visit        Additional Services     INFECTIOUS DISEASE REFERRAL       Your provider has referred you to: U[facility]: [facility] [place] [phone]   [email or web address]  UMP: [facility] (Infectious Disease and HIV Clinic) - [place] [phone]   [email or web address]  N: [employer] [facility] [phone]   [email or web address]    Please be aware that coverage of these services is subject to the terms and limitations of your health insurance plan.  Call member services at your health plan with any benefit or coverage questions.      Please bring the following with you to your appointment:    (1) Any X-Rays, CTs or MRIs which have been performed.  Contact the facility where they were done to arrange for  prior to your scheduled appointment.    (2) List of current medications   (3) This referral request   (4) Any documents/labs given to you for this referral                  Who to contact     If you have questions or need follow up information about today's clinic visit or your schedule please contact [facility] directly at [phone].  Normal or non-critical lab and imaging results will be communicated to you by MyChart, letter or phone within 4 business days after the clinic has received the results. If you do not  hear from us within 7 days, please contact the clinic through [employer] or phone. If you have a critical or abnormal lab result, we will notify you by phone as soon as possible.  Submit refill requests through [employer] or call your pharmacy and they will forward the refill request to us. Please allow 3 business days for your refill to be completed.          Additional Information About Your Visit        [email or web address] Information     [employer] gives you secure access to your electronic health record. If you see a primary care provider, you can also send messages to your care team and make appointments. If you have questions, please call your primary care clinic.  If you do not have a primary care provider, please call [phone] and they will assist you.        Care EveryWhere ID     This is your Care EveryWhere ID. This could be used by other organizations to access your [place] medical records  [id number]        Your Vitals Were     Pulse Temperature Pulse Oximetry BMI (Body Mass Index)          57 100.2  F (37.9  C) (Oral) 98% 22.96 kg/m2         Blood Pressure from Last 3 Encounters:   09/11/18 154/86   08/31/18 124/70   06/03/18 (!) 148/91    Weight from Last 3 Encounters:   09/11/18 180 lb (81.6 kg)   08/31/18 175 lb (79.4 kg)   06/03/18 188 lb (85.3 kg)              We Performed the Following     Anti Nuclear Anusha IgG by IFA with Reflex     Beta strep group A culture     CBC with platelets differential     Comprehensive metabolic panel     CRP inflammation     Erythrocyte sedimentation rate auto     INFECTIOUS DISEASE REFERRAL     Lyme Disease Anusha with reflex to WB Serum     Rapid strep screen     Rheumatoid factor          Today's Medication Changes          These changes are accurate as of 9/11/18  1:32 PM.  If you have any questions, ask your nurse or doctor.               Start taking these medicines.        Dose/Directions    doxycycline 100 MG capsule   Commonly known as:  VIBRAMYCIN   Used for:  Multiple joint  pain   Started by:  Brittney Mancia PA-C        Dose:  100 mg   Take 1 capsule (100 mg) by mouth 2 times daily for 14 days   Quantity:  28 capsule   Refills:  0            Where to get your medicines      These medications were sent to [place], [place] - [address], [address]  [address], [address], [facility] [address]     Phone:  [phone]     doxycycline 100 MG capsule                Primary Care Provider Office Phone # Fax #    [facility] [phone] [address] [place] [address]        Equal Access to Services     [facility]: Hadii saroj morris hadasho Soomaali, waaxda luqadaha, qaybta kaalmada adeegyada, collette lorenzo . So [facility] [phone].    ATENCIÓN: Si habla español, tiene a logan disposición servicios gratuitos de asistencia lingüística. Llame al [phone].    We comply with applicable federal civil rights laws and [place] laws. We do not discriminate on the basis of race, color, national origin, age, disability, sex, sexual orientation, or gender identity.            Thank you!     Thank you for choosing [facility]  for your care. Our goal is always to provide you with excellent care. Hearing back from our patients is one way we can continue to improve our services. Please take a few minutes to complete the written survey that you may receive in the mail after your visit with us. Thank you!             Your Updated Medication List - Protect others around you: Learn how to safely use, store and throw away your medicines at www.disposemymeds.org.          This list is accurate as of 9/11/18  1:32 PM.  Always use your most recent med list.                   Brand Name Dispense Instructions for use Diagnosis    * amphetamine-dextroamphetamine 20 MG per tablet    ADDERALL    60 tablet    Take 1 tablet (20 mg) by mouth 2 times daily    Attention deficit hyperactivity disorder  (ADHD), combined type       * amphetamine-dextroamphetamine 20 MG per tablet   Start taking on:  10/1/2018    ADDERALL    60 tablet    Take 1 tablet (20 mg) by mouth 2 times daily    Attention deficit hyperactivity disorder (ADHD), combined type       * amphetamine-dextroamphetamine 20 MG per tablet   Start taking on:  11/1/2018    ADDERALL    60 tablet    Take 1 tablet (20 mg) by mouth 2 times daily    Attention deficit hyperactivity disorder (ADHD), combined type       buprenorphine HCl-naloxone HCl 8-2 MG per film    SUBOXONE     Place 2.5 Film under the tongue    Attention deficit hyperactivity disorder (ADHD), combined type       doxycycline 100 MG capsule    VIBRAMYCIN    28 capsule    Take 1 capsule (100 mg) by mouth 2 times daily for 14 days    Multiple joint pain       mirtazapine 30 MG tablet    REMERON    30 tablet    Take 1 tablet (30 mg) by mouth At Bedtime    Major depressive disorder, recurrent episode, moderate (H)       polyethylene glycol powder    MIRALAX/GLYCOLAX     Take 17 g by mouth    Attention deficit hyperactivity disorder (ADHD), combined type       * Notice:  This list has 3 medication(s) that are the same as other medications prescribed for you. Read the directions carefully, and ask your doctor or other care provider to review them with you.

## 2018-09-11 NOTE — PROGRESS NOTES
SUBJECTIVE:   Timmy Yousif is a 37 year old male who presents to clinic today for the following health issues:      RESPIRATORY SYMPTOMS      Duration: X 2 days    Description  nasal congestion, sore throat, fever, headache, fatigue/malaise and myalgias    Severity: moderate    Accompanying signs and symptoms: None    History (predisposing factors):  none    Precipitating or alleviating factors: None    Therapies tried and outcome:  none  Seen in June for tick bite right calf.  Was placed on doxycycline  for 2 weeks although it was felt it was not likely Lyme's and not a bull's-eye rash.    Sore throat for 1-2 days.  Bilateral hand and knee pain for about 1-2 weeks.  He does report a.m. stiffness.  Headache.  Also some weight loss.  8 pounds since June.      Allergies   Allergen Reactions     Penicillin [Penicillins]        Past Medical History:   Diagnosis Date     Arthritis December 2009    Back     Benign essential hypertension          Current Outpatient Prescriptions on File Prior to Visit:  [START ON 10/1/2018] amphetamine-dextroamphetamine (ADDERALL) 20 MG per tablet Take 1 tablet (20 mg) by mouth 2 times daily   [START ON 11/1/2018] amphetamine-dextroamphetamine (ADDERALL) 20 MG per tablet Take 1 tablet (20 mg) by mouth 2 times daily   amphetamine-dextroamphetamine (ADDERALL) 20 MG per tablet Take 1 tablet (20 mg) by mouth 2 times daily   buprenorphine HCl-naloxone HCl (SUBOXONE) 8-2 MG per film Place 2.5 Film under the tongue   mirtazapine (REMERON) 30 MG tablet Take 1 tablet (30 mg) by mouth At Bedtime   polyethylene glycol (MIRALAX/GLYCOLAX) powder Take 17 g by mouth     No current facility-administered medications on file prior to visit.     Social History   Substance Use Topics     Smoking status: Never Smoker     Smokeless tobacco: Never Used     Alcohol use Yes      Comment: occ       ROS:  CONSTITUTIONAL: Negative for fatigue or fever.  EYES: Negative for eye problems.  ENT: As above.  RESP: As  above.  CV: Negative for chest pains.  GI: Negative for vomiting.  MUSCULOSKELETAL:  Negative for significant muscle or joint pains.  NEUROLOGIC: Negative for headaches.  SKIN: Negative for rash.    OBJECTIVE:  /86  Pulse 57  Temp 100.2  F (37.9  C) (Oral)  Wt 180 lb (81.6 kg)  SpO2 98%  BMI 22.96 kg/m2  GENERAL APPEARANCE: Healthy, alert and no distress.  EYES:Conjunctiva/sclera clear.  EARS: No cerumen.   Ear canals w/o erythema.  TM's intact w/o erythema.    NOSE/MOUTH: Nose without ulcers, erythema or lesions.  SINUSES: No maxillary sinus tenderness.  THROAT: Mild erythema w/o tonsillar enlargement . No exudates.  NECK: Supple, nontender, no lymphadenopathy.  RESP: Lungs clear to auscultation - no rales, rhonchi or wheezes  CV: Regular rate and rhythm, normal S1 S2, no murmur noted.  NEURO: Awake, alert    SKIN: No rashes  Hands- no obvious swelling. No localized area of tenderness.  Results for orders placed or performed in visit on 09/11/18   CBC with platelets differential   Result Value Ref Range    WBC 5.4 4.0 - 11.0 10e9/L    RBC Count 4.06 (L) 4.4 - 5.9 10e12/L    Hemoglobin 12.3 (L) 13.3 - 17.7 g/dL    Hematocrit 37.1 (L) 40.0 - 53.0 %    MCV 91 78 - 100 fl    MCH 30.3 26.5 - 33.0 pg    MCHC 33.2 31.5 - 36.5 g/dL    RDW 14.1 10.0 - 15.0 %    Platelet Count 253 150 - 450 10e9/L    % Neutrophils 78.7 %    % Lymphocytes 11.3 %    % Monocytes 8.9 %    % Eosinophils 0.7 %    % Basophils 0.4 %    Absolute Neutrophil 4.3 1.6 - 8.3 10e9/L    Absolute Lymphocytes 0.6 (L) 0.8 - 5.3 10e9/L    Absolute Monocytes 0.5 0.0 - 1.3 10e9/L    Absolute Eosinophils 0.0 0.0 - 0.7 10e9/L    Absolute Basophils 0.0 0.0 - 0.2 10e9/L    Diff Method Automated Method    Erythrocyte sedimentation rate auto   Result Value Ref Range    Sed Rate 8 0 - 15 mm/h   Rapid strep screen   Result Value Ref Range    Specimen Description Throat     Rapid Strep A Screen       NEGATIVE: No Group A streptococcal antigen detected by  immunoassay, await culture report.         ASSESSMENT:       ICD-10-CM    1. Multiple joint pain M25.50 CBC with platelets differential     Lyme Disease Anusha with reflex to WB Serum     Rheumatoid factor     CRP inflammation     Erythrocyte sedimentation rate auto     Anti Nuclear Anusha IgG by IFA with Reflex     Comprehensive metabolic panel     INFECTIOUS DISEASE REFERRAL     doxycycline (VIBRAMYCIN) 100 MG capsule   2. Acute nonintractable headache, unspecified headache type R51 INFECTIOUS DISEASE REFERRAL   3. Loss of weight R63.4 INFECTIOUS DISEASE REFERRAL   4. Throat pain R07.0 Rapid strep screen     Beta strep group A culture     INFECTIOUS DISEASE REFERRAL   5. Anemia, unspecified type D64.9 INFECTIOUS DISEASE REFERRAL         PLAN: Patient is very concerned about Lyme's.  Will start on another course of doxy.  If Lymes comes back negative he can stop.  With headache, fever, joint pains I feel like he needs referral to infectious disease which is given today.  Also found to be anemic today.  He denies any blood in his stools.  Last hemoglobin was 2 years ago which was 14.4. OTC iron supplement. OTC Aleve.    Brittney Mancia PA-C

## 2018-09-12 ENCOUNTER — TELEPHONE (OUTPATIENT)
Dept: FAMILY MEDICINE | Facility: CLINIC | Age: 38
End: 2018-09-12

## 2018-09-12 LAB
ANA SER QL IF: NEGATIVE
B BURGDOR IGG+IGM SER QL: 0.01 (ref 0–0.89)
BACTERIA SPEC CULT: NORMAL
RHEUMATOID FACT SER NEPH-ACNC: <20 IU/ML (ref 0–20)
SPECIMEN SOURCE: NORMAL

## 2018-09-12 NOTE — TELEPHONE ENCOUNTER
Patient was Seen yesterday by Brittney Mancia PA-C,  He was referred to [facility]ious [facility].  He has an appointment tomorrow Morning at 8:30am and needs all of the visit notes, referral and labs from this date of service faxed to [facility]  Attn: Dr. Palacios Fax: [phone], by the end of the day today or they will not see him.  Thank you

## 2018-09-12 NOTE — TELEPHONE ENCOUNTER
Faxed requested information to Dr Munguia's office [facility]. Some Labs still in process unable to send.  ISAURO Laurent

## 2018-09-28 ENCOUNTER — MYC MEDICAL ADVICE (OUTPATIENT)
Dept: FAMILY MEDICINE | Facility: CLINIC | Age: 38
End: 2018-09-28

## 2018-10-11 ENCOUNTER — ALLIED HEALTH/NURSE VISIT (OUTPATIENT)
Dept: NURSING | Facility: CLINIC | Age: 38
End: 2018-10-11
Payer: COMMERCIAL

## 2018-10-11 DIAGNOSIS — Z23 NEED FOR PROPHYLACTIC VACCINATION AND INOCULATION AGAINST INFLUENZA: Primary | ICD-10-CM

## 2018-10-11 PROCEDURE — 90686 IIV4 VACC NO PRSV 0.5 ML IM: CPT

## 2018-10-11 PROCEDURE — 99207 ZZC NO CHARGE NURSE ONLY: CPT

## 2018-10-11 PROCEDURE — 90471 IMMUNIZATION ADMIN: CPT

## 2018-10-11 NOTE — MR AVS SNAPSHOT
After Visit Summary   10/11/2018    Timmy Yousif    MRN: [id number]           Patient Information     Date Of Birth          1980        Visit Information        Provider Department      10/11/2018 10:15 AM [facility]        Today's Diagnoses     Need for prophylactic vaccination and inoculation against influenza    -  1       Follow-ups after your visit        Who to contact     If you have questions or need follow up information about today's clinic visit or your schedule please contact [facility] directly at [phone].  Normal or non-critical lab and imaging results will be communicated to you by [employer], letter or phone within 4 business days after the clinic has received the results. If you do not hear from us within 7 days, please contact the clinic through [employer]t or phone. If you have a critical or abnormal lab result, we will notify you by phone as soon as possible.  Submit refill requests through [employer] or call your pharmacy and they will forward the refill request to us. Please allow 3 business days for your refill to be completed.          Additional Information About Your Visit        MyChart Information     [employer] gives you secure access to your electronic health record. If you see a primary care provider, you can also send messages to your care team and make appointments. If you have questions, please call your primary care clinic.  If you do not have a primary care provider, please call [phone] and they will assist you.        Care EveryWhere ID     This is your Care EveryWhere ID. This could be used by other organizations to access your [facility] medical records  [id number]         Blood Pressure from Last 3 Encounters:   09/11/18 154/86   08/31/18 124/70   06/03/18 (!) 148/91    Weight from Last 3 Encounters:   09/11/18 180 lb (81.6 kg)   08/31/18 175 lb (79.4 kg)   06/03/18 188 lb (85.3 kg)              We Performed the  Following     FLU VACCINE, SPLIT VIRUS, IM (QUADRIVALENT) [61557]- >3 YRS     Vaccine Administration, Initial [13036]        Primary Care Provider Office Phone # Fax #    [facility] [place] [phone] [address] [place] [address]        Equal Access to Services     MARCUS KIRKPATRICK : Hadii aad ku hadasho Soomaali, waaxda luqadaha, qaybta kaalmada vikibhavikda, collette laragentryerica goff. So [facility] [phone].    ATENCIÓN: Si habla español, tiene a logan disposición servicios gratuitos de asistencia lingüística. [facility] [phone].    We comply with applicable federal civil rights laws and [place] laws. We do not discriminate on the basis of race, color, national origin, age, disability, sex, sexual orientation, or gender identity.            Thank you!     Thank you for choosing [facility]  for your care. Our goal is always to provide you with excellent care. Hearing back from our patients is one way we can continue to improve our services. Please take a few minutes to complete the written survey that you may receive in the mail after your visit with us. Thank you!             Your Updated Medication List - Protect others around you: Learn how to safely use, store and throw away your medicines at www.disposemymeds.org.          This list is accurate as of 10/11/18 11:21 AM.  Always use your most recent med list.                   Brand Name Dispense Instructions for use Diagnosis    * amphetamine-dextroamphetamine 20 MG per tablet    ADDERALL    60 tablet    Take 1 tablet (20 mg) by mouth 2 times daily    Attention deficit hyperactivity disorder (ADHD), combined type       * amphetamine-dextroamphetamine 20 MG per tablet    ADDERALL    60 tablet    Take 1 tablet (20 mg) by mouth 2 times daily    Attention deficit hyperactivity disorder (ADHD), combined type       * amphetamine-dextroamphetamine 20 MG per tablet   Start taking on:  11/1/2018     ADDERALL    60 tablet    Take 1 tablet (20 mg) by mouth 2 times daily    Attention deficit hyperactivity disorder (ADHD), combined type       mirtazapine 30 MG tablet    REMERON    30 tablet    Take 1 tablet (30 mg) by mouth At Bedtime    Major depressive disorder, recurrent episode, moderate (H)       polyethylene glycol powder    MIRALAX/GLYCOLAX     Take 17 g by mouth    Attention deficit hyperactivity disorder (ADHD), combined type       * Notice:  This list has 3 medication(s) that are the same as other medications prescribed for you. Read the directions carefully, and ask your doctor or other care provider to review them with you.

## 2018-10-11 NOTE — PROGRESS NOTES

## 2018-10-29 ENCOUNTER — HOSPITAL ENCOUNTER (EMERGENCY)
Facility: CLINIC | Age: 38
Discharge: HOME | End: 2018-10-29
Attending: EMERGENCY MEDICINE | Admitting: EMERGENCY MEDICINE
Payer: COMMERCIAL

## 2018-10-29 VITALS
SYSTOLIC BLOOD PRESSURE: 156 MMHG | RESPIRATION RATE: 16 BRPM | DIASTOLIC BLOOD PRESSURE: 95 MMHG | OXYGEN SATURATION: 100 % | TEMPERATURE: 98.6 F

## 2018-10-29 DIAGNOSIS — S61.310A LACERATION OF RIGHT INDEX FINGER WITHOUT FOREIGN BODY WITH DAMAGE TO NAIL, INITIAL ENCOUNTER: ICD-10-CM

## 2018-10-29 PROCEDURE — 12041 INTMD RPR N-HF/GENIT 2.5CM/<: CPT | Performed by: EMERGENCY MEDICINE

## 2018-10-29 PROCEDURE — 12041 INTMD RPR N-HF/GENIT 2.5CM/<: CPT | Mod: Z6 | Performed by: EMERGENCY MEDICINE

## 2018-10-29 PROCEDURE — 99284 EMERGENCY DEPT VISIT MOD MDM: CPT | Mod: 25 | Performed by: EMERGENCY MEDICINE

## 2018-10-29 PROCEDURE — 99283 EMERGENCY DEPT VISIT LOW MDM: CPT | Mod: 25 | Performed by: EMERGENCY MEDICINE

## 2018-10-29 RX ORDER — CEPHALEXIN 500 MG/1
500 CAPSULE ORAL 3 TIMES DAILY
Qty: 15 CAPSULE | Refills: 0 | Status: SHIPPED | OUTPATIENT
Start: 2018-10-29 | End: 2018-11-02

## 2018-10-29 RX ORDER — BUPIVACAINE HYDROCHLORIDE 5 MG/ML
INJECTION, SOLUTION PERINEURAL
Status: DISCONTINUED
Start: 2018-10-29 | End: 2018-10-30 | Stop reason: HOSPADM

## 2018-10-29 NOTE — ED AVS SNAPSHOT
[facility] Emergency Department    [address] [address]    Phone:  [phone]    Fax:  [phone]                                       Timmy Yousif   MRN: [id number]    Department:  [facility] Emergency Department   Date of Visit:  10/29/2018           After Visit Summary Signature Page     I have received my discharge instructions, and my questions have been answered. I have discussed any challenges I see with this plan with the nurse or doctor.    ..........................................................................................................................................  Patient/Patient Representative Signature      ..........................................................................................................................................  Patient Representative Print Name and Relationship to Patient    ..................................................               ................................................  Date                                   Time    ..........................................................................................................................................  Reviewed by Signature/Title    ...................................................              ..............................................  Date                                               Time          22EPIC Rev 08/18

## 2018-10-29 NOTE — ED AVS SNAPSHOT
[facility] Emergency Department    [address] [address]    Phone:  [phone]    Fax:  [phone]                                       Timmy Yousif   MRN: [id number]    Department:  [facility] Emergency Department   Date of Visit:  10/29/2018           Patient Information     Date Of Birth          1980        Your diagnoses for this visit were:     Laceration of right index finger without foreign body with damage to nail, initial encounter        You were seen by Adama Ni MD.        Discharge Instructions       Return to the emergency department if you have increased redness, discharge from the wound, severe pain, or other concerns.  Follow-up with orthopedic surgery clinic for reassessment of the wound and close monitoring.  Stitches out in 10-14 days.    24 Hour Appointment Hotline       To make an appointment at any [facility] clinic, call [phone] ([phone]). If you don't have a family doctor or clinic, we will help you find one. [facility] clinics are conveniently located to serve the needs of you and your family.          ED Discharge Orders     ORTHO  REFERRAL       [facility] is referring you to the Orthopedic  Services at [facility].       The  Representative will assist you in the coordination of your Orthopedic and Musculoskeletal Care as prescribed by your physician.    The  Representative will call you within 1 business day to help schedule your appointment, or you may contact the  Representative at:    All areas ~ [phone]     Type of Referral : Surgical / Specialist       Timeframe requested: 3 - 5 days    Coverage of these services is subject to the terms and limitations of your health insurance plan.  Please call member services at your health plan with any benefit or coverage questions.      If X-rays, CT or MRI's have been performed, please  contact the facility where they were done to arrange for , prior to your scheduled appointment.  Please bring this referral request to your appointment and present it to your specialist.                     Review of your medicines      START taking        Dose / Directions Last dose taken    cephALEXin 500 MG capsule   Commonly known as:  KEFLEX   Dose:  500 mg   Quantity:  15 capsule        Take 1 capsule (500 mg) by mouth 3 times daily for 5 days   Refills:  0          Our records show that you are taking the medicines listed below. If these are incorrect, please call your family doctor or clinic.        Dose / Directions Last dose taken    * amphetamine-dextroamphetamine 20 MG per tablet   Commonly known as:  ADDERALL   Dose:  20 mg   Quantity:  60 tablet        Take 1 tablet (20 mg) by mouth 2 times daily   Refills:  0        * amphetamine-dextroamphetamine 20 MG per tablet   Commonly known as:  ADDERALL   Dose:  20 mg   Quantity:  60 tablet        Take 1 tablet (20 mg) by mouth 2 times daily   Refills:  0        * amphetamine-dextroamphetamine 20 MG per tablet   Commonly known as:  ADDERALL   Dose:  20 mg   Quantity:  60 tablet   Start taking on:  11/1/2018        Take 1 tablet (20 mg) by mouth 2 times daily   Refills:  0        mirtazapine 30 MG tablet   Commonly known as:  REMERON   Dose:  30 mg   Quantity:  30 tablet        Take 1 tablet (30 mg) by mouth At Bedtime   Refills:  11        polyethylene glycol powder   Commonly known as:  MIRALAX/GLYCOLAX   Dose:  17 g        Take 17 g by mouth   Refills:  0        * Notice:  This list has 3 medication(s) that are the same as other medications prescribed for you. Read the directions carefully, and ask your doctor or other care provider to review them with you.            Prescriptions were sent or printed at these locations (1 Prescription)                   Other Prescriptions                Printed at Department/Unit printer (1 of 1)         cephALEXin  (KEFLEX) 500 MG capsule                Orders Needing Specimen Collection     None      Pending Results     No orders found from 10/27/2018 to 10/30/2018.            Pending Culture Results     No orders found from 10/27/2018 to 10/30/2018.            Pending Results Instructions     If you had any lab results that were not finalized at the time of your Discharge, you can call the ED Lab Result RN at [phone]. You will be contacted by this team for any positive Lab results or changes in treatment. The nurses are available 7 days a week from 10A to 6:30P.  You can leave a message 24 hours per day and they will return your call.        Test Results From Your Hospital Stay               Thank you for choosing [facility]       Thank you for choosing [facility] for your care. Our goal is always to provide you with excellent care. Hearing back from our patients is one way we can continue to improve our services. Please take a few minutes to complete the written survey that you may receive in the mail after you visit with us. Thank you!        [employer] Information     [employer] gives you secure access to your electronic health record. If you see a primary care provider, you can also send messages to your care team and make appointments. If you have questions, please call your primary care clinic.  If you do not have a primary care provider, please call [phone] and they will assist you.        Care EveryWhere ID     This is your Care EveryWhere ID. This could be used by other organizations to access your [facility] medical records  [id number]        Equal Access to Services     MARCUS KIRKPATRICK : Hadii saroj Estrada, wasimonda tomas, qaybta kimberlyalmacollette powers . So [facility] [phone].    ATENCIÓN: Si habla español, tiene a logan disposición servicios gratuitos de asistencia lingüística. Llame al [phone].    We comply with applicable federal civil rights laws and [place]  laws. We do not discriminate on the basis of race, color, national origin, age, disability, sex, sexual orientation, or gender identity.            After Visit Summary       This is your record. Keep this with you and show to your community pharmacist(s) and doctor(s) at your next visit.

## 2018-10-30 NOTE — DISCHARGE INSTRUCTIONS
Return to the emergency department if you have increased redness, discharge from the wound, severe pain, or other concerns.  Follow-up with orthopedic surgery clinic for reassessment of the wound and close monitoring.  Stitches out in 10-14 days.

## 2018-10-30 NOTE — ED NOTES
Patient states about an hour ago had pocket knife didn't lock and closed on right second digit tip.  Patient states cut is almost all the way through and finger was gushing prior to him taping and hooding pressure with washcloth.  Bleeding controled at this time.

## 2018-10-30 NOTE — ED PROVIDER NOTES
History     Chief Complaint   Patient presents with     Laceration     right 2nd finger- cut with a pocket knife     HPI  Timmy Yousif is a 38 year old male who presents for laceration.  Localized to right index finger.  Occurred when he accidentally cut himself with his pocket knife just prior to arrival.  Pain is burning, located at the tip of the index finger on the radial aspect, he has not take anything for the pain.  He does note some decreased perception of sensation of the radial aspect of the tip of the index finger. Tetanus immunization status is up to date.  No other injuries.     Problem List:    Patient Active Problem List    Diagnosis Date Noted     Opioid dependence in remission (H) 06/07/2018     Priority: Medium     Benign essential hypertension 02/16/2016     Priority: Medium     ELISHA (obstructive sleep apnea) 09/01/2015     Priority: Medium     Overview:   Mild, wears a mouth guard   Uses mouthpiece       Lumbar disc herniation with radiculopathy 01/21/2014     Priority: Medium     7/10/15-same day appointment, flare in back pain, desires long term follow up with Dr. Hensley,  needs narcotic pain contract at 7/13/15 follow up  appointment with Dr. Hensley  L5/S1.  In 2009 saw neurosurgery.  Occasional flares responding well to robaxin, steroids.  Bilateral L5-S1 transforaminal blocks helpful.       CARDIOVASCULAR SCREENING; LDL GOAL LESS THAN 160 10/31/2010     Priority: Medium        Past Medical History:    Past Medical History:   Diagnosis Date     Arthritis December 2009     Benign essential hypertension        Past Surgical History:    No past surgical history on file.    Family History:    Family History   Problem Relation Age of Onset     Cancer Father      bladder cancer     Other Cancer Father      Bladder     Diabetes Mother      Type 2-managed by diet       Social History:  Marital Status:   [2]  Social History   Substance Use Topics     Smoking status: Never Smoker     Smokeless  tobacco: Never Used     Alcohol use Yes      Comment: occ        Medications:      cephALEXin (KEFLEX) 500 MG capsule   amphetamine-dextroamphetamine (ADDERALL) 20 MG per tablet   [START ON 11/1/2018] amphetamine-dextroamphetamine (ADDERALL) 20 MG per tablet   amphetamine-dextroamphetamine (ADDERALL) 20 MG per tablet   mirtazapine (REMERON) 30 MG tablet   polyethylene glycol (MIRALAX/GLYCOLAX) powder         Review of Systems  A 4 point review of systems was performed. All pertinent positives and negatives were listed in the HPI and rest of ROS were otherwise negative.    Physical Exam   BP: (!) 156/95  Heart Rate: 110  Temp: 98.6  F (37  C)  Resp: 16  SpO2: 100 %      Physical Exam   Constitutional: He is oriented to person, place, and time. He appears well-developed and well-nourished. No distress.   HENT:   Head: Normocephalic and atraumatic.   Eyes: No scleral icterus.   Neck: Normal range of motion. Neck supple.   Neurological: He is alert and oriented to person, place, and time.   Skin: Skin is warm and dry. No rash noted. He is not diaphoretic. No erythema. No pallor.   Right index finger: linear laceration of the distal index finger involving the nail bed; decreased sensation of light touch, two point discrimination is intact. Once anesthesia is obtained, deep expiration of the wound shows exposed bone.       ED Course     ED Course     Laceration repair  Date/Time: 10/29/2018 11:06 PM  Performed by: DAVID LEMA  Authorized by: DAVID LEMA   Consent: Verbal consent obtained.  Consent given by: patient  Patient identity confirmed: verbally with patient  Body area: upper extremity  Location details: right index finger  Laceration length: 2.3 cm  Foreign bodies: no foreign bodies  Tendon involvement: none  Nerve involvement: none  Vascular damage: no  Anesthesia: digital block    Anesthesia:  Local Anesthetic: bupivacaine 0.5% without epinephrine  Preparation: Patient was prepped and draped  in the usual sterile fashion.  Irrigation solution: tap water  Irrigation method: tap  Amount of cleaning: extensive  Debridement: none  Degree of undermining: none  Skin closure: 5-0 nylon  Subcutaneous closure: 5-0 Chromic gut  Number of sutures: 7  Technique: simple  Approximation: close  Approximation difficulty: simple  Dressing: antibiotic ointment and tube gauze  Patient tolerance: Patient tolerated the procedure well with no immediate complications  Comments: Partial nailbed removal to facilitate repair                     Critical Care time:  none               No results found for this or any previous visit (from the past 24 hour(s)).    Medications   bupivacaine (MARCAINE) 0.5 % injection (not administered)       Assessments & Plan (with Medical Decision Making)   Patient remained stable.  Based on mechanism of injury, I am not concerned for retained foreign body.  The laceration was anesthetized, irrigated and closed, see associated procedure note.  Will prescribe abx; cephalexin indicated for this pattern of injury.  He will be discharged home.  Will return to the ED for signs of infection, otherwise follow-up in orthopedic surgery clinic for a recheck.    I have reviewed the nursing notes.    I have reviewed the findings, diagnosis, plan and need for follow up with the patient.       New Prescriptions    CEPHALEXIN (KEFLEX) 500 MG CAPSULE    Take 1 capsule (500 mg) by mouth 3 times daily for 5 days       Final diagnoses:   Laceration of right index finger without foreign body with damage to nail, initial encounter       10/29/2018   [facility] EMERGENCY DEPARTMENT     Adama Ni MD  10/29/18 0116

## 2018-11-02 ENCOUNTER — OFFICE VISIT (OUTPATIENT)
Dept: ORTHOPEDICS | Facility: CLINIC | Age: 38
End: 2018-11-02
Payer: COMMERCIAL

## 2018-11-02 ENCOUNTER — RADIANT APPOINTMENT (OUTPATIENT)
Dept: GENERAL RADIOLOGY | Facility: CLINIC | Age: 38
End: 2018-11-02
Attending: PHYSICIAN ASSISTANT
Payer: COMMERCIAL

## 2018-11-02 VITALS — DIASTOLIC BLOOD PRESSURE: 71 MMHG | HEART RATE: 91 BPM | TEMPERATURE: 98 F | SYSTOLIC BLOOD PRESSURE: 129 MMHG

## 2018-11-02 DIAGNOSIS — S61.310A LACERATION OF RIGHT INDEX FINGER WITHOUT FOREIGN BODY WITH DAMAGE TO NAIL, INITIAL ENCOUNTER: Primary | ICD-10-CM

## 2018-11-02 DIAGNOSIS — S61.210A LACERATION OF RIGHT INDEX FINGER, FOREIGN BODY PRESENCE UNSPECIFIED, NAIL DAMAGE STATUS UNSPECIFIED, INITIAL ENCOUNTER: ICD-10-CM

## 2018-11-02 PROCEDURE — 73140 X-RAY EXAM OF FINGER(S): CPT | Mod: RT

## 2018-11-02 PROCEDURE — 99203 OFFICE O/P NEW LOW 30 MIN: CPT | Performed by: ORTHOPAEDIC SURGERY

## 2018-11-02 RX ORDER — IBUPROFEN 800 MG/1
800 TABLET, FILM COATED ORAL EVERY 8 HOURS PRN
Qty: 30 TABLET | Refills: 1 | Status: SHIPPED | OUTPATIENT
Start: 2018-11-02 | End: 2018-12-03

## 2018-11-02 RX ORDER — CEPHALEXIN 500 MG/1
500 CAPSULE ORAL 3 TIMES DAILY
Qty: 27 CAPSULE | Refills: 0 | Status: SHIPPED | OUTPATIENT
Start: 2018-11-02 | End: 2019-02-28

## 2018-11-02 ASSESSMENT — PAIN SCALES - GENERAL: PAINLEVEL: MILD PAIN (2)

## 2018-11-02 NOTE — LETTER
11/2/2018         RE: Timmy Yousif  [address] [place] [address]        Dear Colleague,    Thank you for referring your patient, Timmy Yousif, to the [facility]. Please see a copy of my visit note below.    Chief Complaint:   Chief Complaint   Patient presents with     Finger     right finger laceration on 10/29/2018      Timmy Yousif is seen today in the [facility] for evaluation of right index finger laceration at the request of Dr. Adama Ni     HPI: Timmy Yousif is a 38 year old male, right-hand dominant, who presents for evaluation and management of a right index finger injury. He injured his hand on 10/29/2018. Pocketknife opened while his hand was in his pocket and cut his finger. Was seen at ED that day and treated with sutures and antibiotics. It has been 4 days since the initial injury. Pain is and throbbing mild, rated a 2/10. Pain is located at laceration site and diffuse at the distal finger. Told to follow up with orthopaedics. Takes over-the-counter ibuprofen for pain. Overall he thinks the finger is improving and healing.    He reports having mild pain/discomfort around the injury site. He denies numbness or tingling now but has had in the past. He denies any other injuries to his upper extremity.     Symptoms: mild pain.  Location: distal finger.  Pain severity: 2/10  Pain quality: dull ache  Frequency of symptoms: frequently.  Aggravating factors: with any palpation or light touch.  Relieving factors: at rest, with splint.    Previous treatment: ibuprofen and splint     Past medical history:  has a past medical history of Arthritis (December 2009) and Benign essential hypertension. He also has no past medical history of Cancer (H); Cerebral infarction (H); Congestive heart failure, unspecified; COPD (chronic obstructive pulmonary disease) (H); Coronary artery disease; Depressive disorder; Diabetes (H); History of blood  transfusion; Thyroid disease; or Uncomplicated asthma.   Patient Active Problem List    Diagnosis Date Noted     Opioid dependence in remission (H) 06/07/2018     Priority: Medium     Benign essential hypertension 02/16/2016     Priority: Medium     ELISHA (obstructive sleep apnea) 09/01/2015     Priority: Medium     Overview:   Mild, wears a mouth guard   Uses mouthpiece       Lumbar disc herniation with radiculopathy 01/21/2014     Priority: Medium     7/10/15-same day appointment, flare in back pain, desires long term follow up with Dr. Hensley,  needs narcotic pain contract at 7/13/15 follow up  appointment with Dr. Hensley  L5/S1.  In 2009 saw neurosurgery.  Occasional flares responding well to robaxin, steroids.  Bilateral L5-S1 transforaminal blocks helpful.       CARDIOVASCULAR SCREENING; LDL GOAL LESS THAN 160 10/31/2010     Priority: Medium       Past surgical history: Reviewed. No pertinent surgical history.     Medications:    Current Outpatient Prescriptions   Medication Sig Dispense Refill     amphetamine-dextroamphetamine (ADDERALL) 20 MG per tablet Take 1 tablet (20 mg) by mouth 2 times daily 60 tablet 0     amphetamine-dextroamphetamine (ADDERALL) 20 MG per tablet Take 1 tablet (20 mg) by mouth 2 times daily 60 tablet 0     cephALEXin (KEFLEX) 500 MG capsule Take 1 capsule (500 mg) by mouth 3 times daily for 9 days 27 capsule 0     ibuprofen (ADVIL/MOTRIN) 800 MG tablet Take 1 tablet (800 mg) by mouth every 8 hours as needed for moderate pain 30 tablet 1     mirtazapine (REMERON) 30 MG tablet Take 1 tablet (30 mg) by mouth At Bedtime 30 tablet 11     polyethylene glycol (MIRALAX/GLYCOLAX) powder Take 17 g by mouth          Allergies:     Allergies   Allergen Reactions     Penicillin [Penicillins] Rash        Family History: family history includes Cancer in his father; Diabetes in his mother; Other Cancer in his father.     Social History:   reports that he has never smoked. He has never used smokeless  tobacco. He reports that he drinks alcohol. He reports that he does not use illicit drugs.    Review of Systems:  ROS: 10 point ROS neg other than the symptoms noted above in the HPI and past medical history.    This document serves as a record of the services and decisions personally performed and made by Jackson Haywood MD. It was created on his behalf by Shira Chavez, a trained medical scribe. The creation of this document is based the provider's statements to the medical scribe.    Scribe Shira Chavez 11:47 AM 11/2/2018     Physical Exam  GENERAL APPEARANCE: healthy, alert, no distress.   SKIN: no suspicious lesions or rashes  NEURO: Normal strength and tone, mentation intact and speech normal  PSYCH:  mentation appears normal and affect normal. Not anxious.  RESPIRATORY: No increased work of breathing.    /71  Pulse 91  Temp 98  F (36.7  C)     RIGHT HAND EXAM:    The splint was removed.  Oblique laceration of the dorsal and radial aspect of the right index finger. Does cut through the nail plate.   There is  mild swelling in the index finger.  There is mild tenderness over laceration site.  There is no ecchymosis.  There is no erythema of the surrounding skin.  There is no maceration of the skin.  There is no deformity in the area.  Range of motion: stiff at the proximal interphalangeal joint and (any)movements are slight painful.  Intact sensation median, radial, ulnar nerves of the hand, and intact sensation to light touch radial and ulnar digital nerves to fingers.  Brisk capillary refill to all fingers.   Palpable radial pulse, 2+  Intact epl fpl fdp fds edc wrist flexion/extension biceps triceps deltoid.    X-rays:  3 views right index finger from 11/2/2018 were reviewed personally in clinic today. On my review of the Xrays, No obvious fracture or dislocation. No obvious bony abnormality or lesion. No obvious foreign body.    Impression:  38 year old male with right index finger laceration with injury  to nail.    Plan:  * Reviewed imaging with patient. Also, clinical exam findings.  * Regarding the status of the nail, will partially fall off. Nail bed underneath likely injured.  * Immobilization: No immobilization needed at this time unless per comfort. OK to wear splint with hand activities.  * Work on proximal interphalangeal and interphalangeal joint range of motion per comfort.     * Rest  * Activity modification - avoid activities that aggravate symptoms.  * Ibuprofen 800 mg prescribed.   * Oral Keflex prescribed as antibiotics.   * NSAIDS - regular use for inflammation, with food, as long as no contra-indications. Please discuss with pcp if needed.  * Elevation of extremity to reduce swelling  * Tylenol as needed for pain  * Return to clinic in 1 week for suture removal, or sooner as needed.     The information in this document, created by a scribe for me, accurately reflects the services I personally performed and the decisions made by me. I have reviewed and approved this document for accuracy.        Jackson Haywood M.D., M.S.  Dept. of Orthopaedic Surgery  [facility]      Again, thank you for allowing me to participate in the care of your patient.        Sincerely,        Jackson Haywood MD

## 2018-11-02 NOTE — MR AVS SNAPSHOT
After Visit Summary   11/2/2018    Timmy Yousif    MRN: [id number]           Patient Information     Date Of Birth          1980        Visit Information        Provider Department      11/2/2018 10:00 AM Jackson Haywood MD [facility]        Today's Diagnoses     Laceration of right index finger without foreign body with damage to nail, initial encounter    -  1       Follow-ups after your visit        Follow-up notes from your care team     Return in about 1 week (around 11/9/2018) for wound check.      Who to contact     If you have questions or need follow up information about today's clinic visit or your schedule please contact [facility] directly at [phone].  Normal or non-critical lab and imaging results will be communicated to you by MyChart, letter or phone within 4 business days after the clinic has received the results. If you do not hear from us within 7 days, please contact the clinic through [employer] or phone. If you have a critical or abnormal lab result, we will notify you by phone as soon as possible.  Submit refill requests through [employer] or call your pharmacy and they will forward the refill request to us. Please allow 3 business days for your refill to be completed.          Additional Information About Your Visit        MyChart Information     [employer] gives you secure access to your electronic health record. If you see a primary care provider, you can also send messages to your care team and make appointments. If you have questions, please call your primary care clinic.  If you do not have a primary care provider, please call [phone] and they will assist you.        Care EveryWhere ID     This is your Care EveryWhere ID. This could be used by other organizations to access your [place] medical records  [id number]        Your Vitals Were     Pulse Temperature                91 98  F (36.7  C)           Blood Pressure from Last 3  Encounters:   11/02/18 129/71   10/29/18 (!) 156/95   09/11/18 154/86    Weight from Last 3 Encounters:   09/11/18 180 lb (81.6 kg)   08/31/18 175 lb (79.4 kg)   06/03/18 188 lb (85.3 kg)                 Today's Medication Changes          These changes are accurate as of 11/2/18 11:59 PM.  If you have any questions, ask your nurse or doctor.               Start taking these medicines.        Dose/Directions    ibuprofen 800 MG tablet   Commonly known as:  ADVIL/MOTRIN   Used for:  Laceration of right index finger without foreign body with damage to nail, initial encounter   Started by:  Jackson Haywood MD        Dose:  800 mg   Take 1 tablet (800 mg) by mouth every 8 hours as needed for moderate pain   Quantity:  30 tablet   Refills:  1            Where to get your medicines      These medications were sent to [facility] - [place], [place] - [address]  [address], [place] [facility] [place] [facility] [address]     Phone:  [phone]     cephALEXin 500 MG capsule    ibuprofen 800 MG tablet                Primary Care Provider Office Phone # Fax #    [facility] [phone] [address] [address]        Equal Access to Services     MARCUS KIRKPATRICK AH: Hadii saroj morris hadasho Soomaali, waaxda luqadaha, qaybta kaalmada adeegyada, waxay idiin hayrandy goff. So [facility] [phone].    ATENCIÓN: Si habla español, tiene a logan disposición servicios gratuitos de asistencia lingüística. Llame al [phone].    We comply with applicable federal civil rights laws and [place] laws. We do not discriminate on the basis of race, color, national origin, age, disability, sex, sexual orientation, or gender identity.            Thank you!     Thank you for choosing [facility]  for your care. Our goal is always to provide you with excellent care. Hearing back from our patients is one way we can continue to improve our services. Please take a  few minutes to complete the written survey that you may receive in the mail after your visit with us. Thank you!             Your Updated Medication List - Protect others around you: Learn how to safely use, store and throw away your medicines at www.disposemymeds.org.          This list is accurate as of 11/2/18 11:59 PM.  Always use your most recent med list.                   Brand Name Dispense Instructions for use Diagnosis    * amphetamine-dextroamphetamine 20 MG per tablet    ADDERALL    60 tablet    Take 1 tablet (20 mg) by mouth 2 times daily    Attention deficit hyperactivity disorder (ADHD), combined type       * amphetamine-dextroamphetamine 20 MG per tablet    ADDERALL    60 tablet    Take 1 tablet (20 mg) by mouth 2 times daily    Attention deficit hyperactivity disorder (ADHD), combined type       cephALEXin 500 MG capsule    KEFLEX    27 capsule    Take 1 capsule (500 mg) by mouth 3 times daily for 9 days    Laceration of right index finger without foreign body with damage to nail, initial encounter       ibuprofen 800 MG tablet    ADVIL/MOTRIN    30 tablet    Take 1 tablet (800 mg) by mouth every 8 hours as needed for moderate pain    Laceration of right index finger without foreign body with damage to nail, initial encounter       mirtazapine 30 MG tablet    REMERON    30 tablet    Take 1 tablet (30 mg) by mouth At Bedtime    Major depressive disorder, recurrent episode, moderate (H)       polyethylene glycol powder    MIRALAX/GLYCOLAX     Take 17 g by mouth    Attention deficit hyperactivity disorder (ADHD), combined type       * Notice:  This list has 2 medication(s) that are the same as other medications prescribed for you. Read the directions carefully, and ask your doctor or other care provider to review them with you.

## 2018-11-02 NOTE — PROGRESS NOTES
Chief Complaint:   Chief Complaint   Patient presents with     Finger     right finger laceration on 10/29/2018      Timmy Yousif is seen today in the [facility] for evaluation of right index finger laceration at the request of Dr. Adama Ni     HPI: Timmy Yousif is a 38 year old male, right-hand dominant, who presents for evaluation and management of a right index finger injury. He injured his hand on 10/29/2018. Pocketknife opened while his hand was in his pocket and cut his finger. Was seen at ED that day and treated with sutures and antibiotics. It has been 4 days since the initial injury. Pain is and throbbing mild, rated a 2/10. Pain is located at laceration site and diffuse at the distal finger. Told to follow up with orthopaedics. Takes over-the-counter ibuprofen for pain. Overall he thinks the finger is improving and healing.    He reports having mild pain/discomfort around the injury site. He denies numbness or tingling now but has had in the past. He denies any other injuries to his upper extremity.     Symptoms: mild pain.  Location: distal finger.  Pain severity: 2/10  Pain quality: dull ache  Frequency of symptoms: frequently.  Aggravating factors: with any palpation or light touch.  Relieving factors: at rest, with splint.    Previous treatment: ibuprofen and splint     Past medical history:  has a past medical history of Arthritis (December 2009) and Benign essential hypertension. He also has no past medical history of Cancer (H); Cerebral infarction (H); Congestive heart failure, unspecified; COPD (chronic obstructive pulmonary disease) (H); Coronary artery disease; Depressive disorder; Diabetes (H); History of blood transfusion; Thyroid disease; or Uncomplicated asthma.   Patient Active Problem List    Diagnosis Date Noted     Opioid dependence in remission (H) 06/07/2018     Priority: Medium     Benign essential hypertension 02/16/2016     Priority: Medium      ELISHA (obstructive sleep apnea) 09/01/2015     Priority: Medium     Overview:   Mild, wears a mouth guard   Uses mouthpiece       Lumbar disc herniation with radiculopathy 01/21/2014     Priority: Medium     7/10/15-same day appointment, flare in back pain, desires long term follow up with Dr. Hensley,  needs narcotic pain contract at 7/13/15 follow up  appointment with Dr. Hensley  L5/S1.  In 2009 saw neurosurgery.  Occasional flares responding well to robaxin, steroids.  Bilateral L5-S1 transforaminal blocks helpful.       CARDIOVASCULAR SCREENING; LDL GOAL LESS THAN 160 10/31/2010     Priority: Medium       Past surgical history: Reviewed. No pertinent surgical history.     Medications:    Current Outpatient Prescriptions   Medication Sig Dispense Refill     amphetamine-dextroamphetamine (ADDERALL) 20 MG per tablet Take 1 tablet (20 mg) by mouth 2 times daily 60 tablet 0     amphetamine-dextroamphetamine (ADDERALL) 20 MG per tablet Take 1 tablet (20 mg) by mouth 2 times daily 60 tablet 0     cephALEXin (KEFLEX) 500 MG capsule Take 1 capsule (500 mg) by mouth 3 times daily for 9 days 27 capsule 0     ibuprofen (ADVIL/MOTRIN) 800 MG tablet Take 1 tablet (800 mg) by mouth every 8 hours as needed for moderate pain 30 tablet 1     mirtazapine (REMERON) 30 MG tablet Take 1 tablet (30 mg) by mouth At Bedtime 30 tablet 11     polyethylene glycol (MIRALAX/GLYCOLAX) powder Take 17 g by mouth          Allergies:     Allergies   Allergen Reactions     Penicillin [Penicillins] Rash        Family History: family history includes Cancer in his father; Diabetes in his mother; Other Cancer in his father.     Social History:   reports that he has never smoked. He has never used smokeless tobacco. He reports that he drinks alcohol. He reports that he does not use illicit drugs.    Review of Systems:  ROS: 10 point ROS neg other than the symptoms noted above in the HPI and past medical history.    This document serves as a record of the  services and decisions personally performed and made by Jackson Haywood MD. It was created on his behalf by Shira Chavez, a trained medical scribe. The creation of this document is based the provider's statements to the medical scribe.    Joelleibisaias Chavez 11:47 AM 11/2/2018     Physical Exam  GENERAL APPEARANCE: healthy, alert, no distress.   SKIN: no suspicious lesions or rashes  NEURO: Normal strength and tone, mentation intact and speech normal  PSYCH:  mentation appears normal and affect normal. Not anxious.  RESPIRATORY: No increased work of breathing.    /71  Pulse 91  Temp 98  F (36.7  C)     RIGHT HAND EXAM:    The splint was removed.  Oblique laceration of the dorsal and radial aspect of the right index finger. Does cut through the nail plate.   There is  mild swelling in the index finger.  There is mild tenderness over laceration site.  There is no ecchymosis.  There is no erythema of the surrounding skin.  There is no maceration of the skin.  There is no deformity in the area.  Range of motion: stiff at the proximal interphalangeal joint and (any)movements are slight painful.  Intact sensation median, radial, ulnar nerves of the hand, and intact sensation to light touch radial and ulnar digital nerves to fingers.  Brisk capillary refill to all fingers.   Palpable radial pulse, 2+  Intact epl fpl fdp fds edc wrist flexion/extension biceps triceps deltoid.    X-rays:  3 views right index finger from 11/2/2018 were reviewed personally in clinic today. On my review of the Xrays, No obvious fracture or dislocation. No obvious bony abnormality or lesion. No obvious foreign body.    Impression:  38 year old male with right index finger laceration with injury to nail.    Plan:  * Reviewed imaging with patient. Also, clinical exam findings.  * Regarding the status of the nail, will partially fall off. Nail bed underneath likely injured.  * Immobilization: No immobilization needed at this time unless per  comfort. OK to wear splint with hand activities.  * Work on proximal interphalangeal and interphalangeal joint range of motion per comfort.     * Rest  * Activity modification - avoid activities that aggravate symptoms.  * Ibuprofen 800 mg prescribed.   * Oral Keflex prescribed as antibiotics.   * NSAIDS - regular use for inflammation, with food, as long as no contra-indications. Please discuss with pcp if needed.  * Elevation of extremity to reduce swelling  * Tylenol as needed for pain  * Return to clinic in 1 week for suture removal, or sooner as needed.     The information in this document, created by a scribe for me, accurately reflects the services I personally performed and the decisions made by me. I have reviewed and approved this document for accuracy.        Jackson Haywood M.D., M.S.  Dept. of Orthopaedic Surgery  [facility]

## 2018-12-03 ENCOUNTER — OFFICE VISIT (OUTPATIENT)
Dept: FAMILY MEDICINE | Facility: CLINIC | Age: 38
End: 2018-12-03
Payer: COMMERCIAL

## 2018-12-03 VITALS
RESPIRATION RATE: 18 BRPM | TEMPERATURE: 98.8 F | BODY MASS INDEX: 24.38 KG/M2 | HEIGHT: 74 IN | DIASTOLIC BLOOD PRESSURE: 80 MMHG | HEART RATE: 116 BPM | WEIGHT: 190 LBS | SYSTOLIC BLOOD PRESSURE: 136 MMHG

## 2018-12-03 DIAGNOSIS — F33.1 MAJOR DEPRESSIVE DISORDER, RECURRENT EPISODE, MODERATE (H): Primary | ICD-10-CM

## 2018-12-03 DIAGNOSIS — F15.20 STIMULANT DEPENDENCE (H): ICD-10-CM

## 2018-12-03 DIAGNOSIS — F90.2 ATTENTION DEFICIT HYPERACTIVITY DISORDER (ADHD), COMBINED TYPE: ICD-10-CM

## 2018-12-03 PROCEDURE — 99214 OFFICE O/P EST MOD 30 MIN: CPT | Performed by: NURSE PRACTITIONER

## 2018-12-03 RX ORDER — DEXTROAMPHETAMINE SACCHARATE, AMPHETAMINE ASPARTATE, DEXTROAMPHETAMINE SULFATE AND AMPHETAMINE SULFATE 5; 5; 5; 5 MG/1; MG/1; MG/1; MG/1
20 TABLET ORAL 2 TIMES DAILY
Qty: 60 TABLET | Refills: 0 | COMMUNITY
Start: 2019-01-03 | End: 2019-02-28

## 2018-12-03 RX ORDER — DEXTROAMPHETAMINE SACCHARATE, AMPHETAMINE ASPARTATE, DEXTROAMPHETAMINE SULFATE AND AMPHETAMINE SULFATE 5; 5; 5; 5 MG/1; MG/1; MG/1; MG/1
20 TABLET ORAL 2 TIMES DAILY
Qty: 60 TABLET | Refills: 0 | COMMUNITY
Start: 2018-12-03 | End: 2019-02-28

## 2018-12-03 RX ORDER — DEXTROAMPHETAMINE SACCHARATE, AMPHETAMINE ASPARTATE, DEXTROAMPHETAMINE SULFATE AND AMPHETAMINE SULFATE 5; 5; 5; 5 MG/1; MG/1; MG/1; MG/1
20 TABLET ORAL DAILY
Qty: 30 TABLET | Refills: 0 | Status: SHIPPED | OUTPATIENT
Start: 2018-12-03 | End: 2018-12-03

## 2018-12-03 RX ORDER — DEXTROAMPHETAMINE SACCHARATE, AMPHETAMINE ASPARTATE, DEXTROAMPHETAMINE SULFATE AND AMPHETAMINE SULFATE 5; 5; 5; 5 MG/1; MG/1; MG/1; MG/1
20 TABLET ORAL DAILY
Qty: 30 TABLET | Refills: 0 | Status: SHIPPED | OUTPATIENT
Start: 2019-02-03 | End: 2018-12-03

## 2018-12-03 RX ORDER — BUPRENORPHINE HYDROCHLORIDE AND NALOXONE HYDROCHLORIDE DIHYDRATE 8; 2 MG/1; MG/1
2.5 TABLET SUBLINGUAL
COMMUNITY
Start: 2018-12-26 | End: 2019-01-25

## 2018-12-03 RX ORDER — MIRTAZAPINE 30 MG/1
30 TABLET, FILM COATED ORAL AT BEDTIME
Qty: 30 TABLET | Refills: 11 | Status: SHIPPED | OUTPATIENT
Start: 2018-12-03 | End: 2019-08-28

## 2018-12-03 RX ORDER — DEXTROAMPHETAMINE SACCHARATE, AMPHETAMINE ASPARTATE, DEXTROAMPHETAMINE SULFATE AND AMPHETAMINE SULFATE 5; 5; 5; 5 MG/1; MG/1; MG/1; MG/1
20 TABLET ORAL DAILY
Qty: 30 TABLET | Refills: 0 | Status: SHIPPED | OUTPATIENT
Start: 2019-01-03 | End: 2018-12-03

## 2018-12-03 RX ORDER — DEXTROAMPHETAMINE SACCHARATE, AMPHETAMINE ASPARTATE, DEXTROAMPHETAMINE SULFATE AND AMPHETAMINE SULFATE 5; 5; 5; 5 MG/1; MG/1; MG/1; MG/1
20 TABLET ORAL 2 TIMES DAILY
Qty: 60 TABLET | Refills: 0 | Status: SHIPPED | OUTPATIENT
Start: 2018-12-03 | End: 2019-02-28

## 2018-12-03 ASSESSMENT — ANXIETY QUESTIONNAIRES
3. WORRYING TOO MUCH ABOUT DIFFERENT THINGS: NOT AT ALL
1. FEELING NERVOUS, ANXIOUS, OR ON EDGE: NOT AT ALL
GAD7 TOTAL SCORE: 0
7. FEELING AFRAID AS IF SOMETHING AWFUL MIGHT HAPPEN: NOT AT ALL
6. BECOMING EASILY ANNOYED OR IRRITABLE: NOT AT ALL
5. BEING SO RESTLESS THAT IT IS HARD TO SIT STILL: NOT AT ALL
2. NOT BEING ABLE TO STOP OR CONTROL WORRYING: NOT AT ALL

## 2018-12-03 ASSESSMENT — PATIENT HEALTH QUESTIONNAIRE - PHQ9
5. POOR APPETITE OR OVEREATING: NOT AT ALL
SUM OF ALL RESPONSES TO PHQ QUESTIONS 1-9: 0

## 2018-12-03 NOTE — PROGRESS NOTES
"  SUBJECTIVE:   Timmy Yousif is a 38 year old male who presents to clinic today for the following health issues:      Depression Followup    Status since last visit: Stable     See PHQ-9 for current symptoms.  Other associated symptoms: None    Complicating factors:   Significant life event:  No   Current substance abuse:  None  Anxiety or Panic symptoms:  No    PHQ 10/17/2017 12/1/2017 5/31/2018   PHQ-9 Total Score 2 0 0   Q9: Suicide Ideation Not at all Not at all Not at all     PHQ-9  English  PHQ-9   Any Language  Suicide Assessment Five-step Evaluation and Treatment (SAFE-T)    ADHD Follow-Up    Date of last ADHD office visit: 8/31/2018  Status since last visit: Stable  Taking controlled (daily) medications as prescribed: Yes                       Parent/Patient Concerns with Medications: None  ADHD Medication     Amphetamines Disp Start End    amphetamine-dextroamphetamine (ADDERALL) 20 MG per tablet 60 tablet 8/31/2018     Sig - Route: Take 1 tablet (20 mg) by mouth 2 times daily - Oral    Class: Local Print          Sleep: no problems  Home/Family Concerns: None  Peer Concerns: None      Medication Benefits:   Controlled symptoms: Hyperactivity - motor restlessness, Attention span, Distractability and Finishing tasks  Uncontrolled Symptoms: None    Medication side effects:  Side effects noted: appetite suppression, weight loss, insomnia, tics, palpitations, stomach ache, headache, emotional lability, rebound irritability, drowsiness, \"zombie\" effect, growth suppression and dry mouth  Denies: appetite suppression, weight loss, insomnia, tics, palpitations, stomach ache, headache, emotional lability, rebound irritability, drowsiness, \"zombie\" effect, growth suppression and dry mouth      Problem list and histories reviewed & adjusted, as indicated.  Additional history: as documented    Patient Active Problem List   Diagnosis     CARDIOVASCULAR SCREENING; LDL GOAL LESS THAN 160     Lumbar disc herniation " "with radiculopathy     ELISHA (obstructive sleep apnea)     Benign essential hypertension     Opioid dependence in remission (H)     Attention deficit hyperactivity disorder (ADHD), combined type     History reviewed. No pertinent surgical history.    Social History   Substance Use Topics     Smoking status: Never Smoker     Smokeless tobacco: Never Used     Alcohol use Yes      Comment: occ     Family History   Problem Relation Age of Onset     Cancer Father      bladder cancer     Other Cancer Father      Bladder     Diabetes Mother      Type 2-managed by diet         Current Outpatient Prescriptions   Medication Sig Dispense Refill     amphetamine-dextroamphetamine (ADDERALL) 20 MG per tablet Take 1 tablet (20 mg) by mouth 2 times daily 60 tablet 0     [START ON 12/26/2018] buprenorphine-naloxone (SUBOXONE) 8-2 MG SUBL sublingual tablet Place 2.5 tablets under the tongue       mirtazapine (REMERON) 30 MG tablet Take 1 tablet (30 mg) by mouth At Bedtime 30 tablet 11     Allergies   Allergen Reactions     Penicillin [Penicillins] Rash     Labs reviewed in EPIC    Reviewed and updated as needed this visit by clinical staff  Tobacco  Allergies  Meds  Med Hx  Surg Hx  Fam Hx  Soc Hx      Reviewed and updated as needed this visit by Provider         ROS:  Constitutional, HEENT, cardiovascular, pulmonary, gi and gu systems are negative, except as otherwise noted.    OBJECTIVE:     /80 (Cuff Size: Adult Large)  Pulse 116  Temp 98.8  F (37.1  C) (Tympanic)  Resp 18  Ht 6' 2.25\" (1.886 m)  Wt 190 lb (86.2 kg)  BMI 24.23 kg/m2  Body mass index is 24.23 kg/(m^2).  GENERAL: healthy, alert and no distress  RESP: lungs clear to auscultation - no rales, rhonchi or wheezes  CV: regular rate and rhythm, normal S1 S2, no S3 or S4, no murmur, click or rub  NEURO: Normal strength and tone, mentation intact and speech normal  PSYCH: mentation appears normal, affect normal/bright    Diagnostic Test Results:  none "     ASSESSMENT/PLAN:     1. Major depressive disorder, recurrent episode, moderate (H)  Stable.   - mirtazapine (REMERON) 30 MG tablet; Take 1 tablet (30 mg) by mouth At Bedtime  Dispense: 30 tablet; Refill: 11    2. Attention deficit hyperactivity disorder (ADHD), combined type  Stable. Recheck in 3 months  - amphetamine-dextroamphetamine (ADDERALL) 20 MG tablet; Take 1 tablet (20 mg) by mouth BID  Dispense: 30 tablet; Refill: 0  - amphetamine-dextroamphetamine (ADDERALL) 20 MG tablet; Take 1 tablet (20 mg) by mouth BID  Dispense: 30 tablet; Refill: 0  - amphetamine-dextroamphetamine (ADDERALL) 20 MG tablet; Take 1 tablet (20 mg) by mouth BID Dispense: 30 tablet; Refill: 0    3. Stimulant dependence (H)  Stable.   - amphetamine-dextroamphetamine (ADDERALL) 20 MG tablet; Take 1 tablet (20 mg) by mouth BID  Dispense: 30 tablet; Refill: 0  - amphetamine-dextroamphetamine (ADDERALL) 20 MG tablet; Take 1 tablet (20 mg) by mouth BID  Dispense: 30 tablet; Refill: 0  - amphetamine-dextroamphetamine (ADDERALL) 20 MG tablet; Take 1 tablet (20 mg) by mouth BID Dispense: 30 tablet; Refill: 0    Home care instructions were reviewed with the patient. The risks, benefits and treatment options of prescribed medications or other treatments have been discussed with the patient. The patient verbalized their understanding and should call or follow up if no improvement or if they develop further problems.      CARIE Morfin [facility]

## 2018-12-03 NOTE — MR AVS SNAPSHOT
After Visit Summary   12/3/2018    Timmy Yousif    MRN: [id number]           Patient Information     Date Of Birth          1980        Visit Information        Provider Department      12/3/2018 11:00 AM Beatriz Krishna APRN CNP [facility]        Today's Diagnoses     Major depressive disorder, recurrent episode, moderate (H)    -  1    Attention deficit hyperactivity disorder (ADHD), combined type        Stimulant dependence (H)           Follow-ups after your visit        Follow-up notes from your care team     Return in about 3 months (around 3/3/2019) for ADHD.      Who to contact     If you have questions or need follow up information about today's clinic visit or your schedule please contact [facility] directly at [phone].  Normal or non-critical lab and imaging results will be communicated to you by DailyBurnhart, letter or phone within 4 business days after the clinic has received the results. If you do not hear from us within 7 days, please contact the clinic through DailyBurnhart or phone. If you have a critical or abnormal lab result, we will notify you by phone as soon as possible.  Submit refill requests through Instablogs or call your pharmacy and they will forward the refill request to us. Please allow 3 business days for your refill to be completed.          Additional Information About Your Visit        MyChart Information     Inst[employer]ogs gives you secure access to your electronic health record. If you see a primary care provider, you can also send messages to your care team and make appointments. If you have questions, please call your primary care clinic.  If you do not have a primary care provider, please call [phone] and they will assist you.        Care EveryWhere ID     This is your Care EveryWhere ID. This could be used by other organizations to access your [place] medical records  [id number]        Your Vitals Were     Pulse  "Temperature Respirations Height BMI (Body Mass Index)       116 98.8  F (37.1  C) (Tympanic) 18 6' 2.25\" (1.886 m) 24.23 kg/m2        Blood Pressure from Last 3 Encounters:   12/03/18 136/80   11/02/18 129/71   10/29/18 (!) 156/95    Weight from Last 3 Encounters:   12/03/18 190 lb (86.2 kg)   09/11/18 180 lb (81.6 kg)   08/31/18 175 lb (79.4 kg)              Today, you had the following     No orders found for display         Today's Medication Changes          These changes are accurate as of 12/3/18 11:32 AM.  If you have any questions, ask your nurse or doctor.               These medicines have changed or have updated prescriptions.        Dose/Directions    * amphetamine-dextroamphetamine 20 MG tablet   Commonly known as:  ADDERALL   This may have changed:  Another medication with the same name was added. Make sure you understand how and when to take each.   Used for:  Attention deficit hyperactivity disorder (ADHD), combined type   Changed by:  Beatriz Krishna APRN CNP        Dose:  20 mg   Take 1 tablet (20 mg) by mouth 2 times daily   Quantity:  60 tablet   Refills:  0       * amphetamine-dextroamphetamine 20 MG tablet   Commonly known as:  ADDERALL   This may have changed:  You were already taking a medication with the same name, and this prescription was added. Make sure you understand how and when to take each.   Used for:  Attention deficit hyperactivity disorder (ADHD), combined type, Stimulant dependence (H)   Changed by:  Beatriz Krishna APRN CNP        Dose:  20 mg   Take 1 tablet (20 mg) by mouth daily   Quantity:  30 tablet   Refills:  0       * amphetamine-dextroamphetamine 20 MG tablet   Commonly known as:  ADDERALL   This may have changed:  You were already taking a medication with the same name, and this prescription was added. Make sure you understand how and when to take each.   Used for:  Attention deficit hyperactivity disorder (ADHD), combined type, Stimulant dependence (H) "   Changed by:  Beatriz Krishna APRN CNP        Dose:  20 mg   Start taking on:  1/3/2019   Take 1 tablet (20 mg) by mouth daily   Quantity:  30 tablet   Refills:  0       * amphetamine-dextroamphetamine 20 MG tablet   Commonly known as:  ADDERALL   This may have changed:  You were already taking a medication with the same name, and this prescription was added. Make sure you understand how and when to take each.   Used for:  Attention deficit hyperactivity disorder (ADHD), combined type, Stimulant dependence (H)   Changed by:  Beatriz Krishna APRN CNP        Dose:  20 mg   Start taking on:  2/3/2019   Take 1 tablet (20 mg) by mouth daily   Quantity:  30 tablet   Refills:  0       * Notice:  This list has 4 medication(s) that are the same as other medications prescribed for you. Read the directions carefully, and ask your doctor or other care provider to review them with you.      Stop taking these medicines if you haven't already. Please contact your care team if you have questions.     ibuprofen 800 MG tablet   Commonly known as:  ADVIL/MOTRIN   Stopped by:  Beatriz Krisnha APRN CNP           polyethylene glycol powder   Commonly known as:  MIRALAX/GLYCOLAX   Stopped by:  Beatriz Krishna APRN CNP                Where to get your medicines      These medications were sent to [facility] [address] [address]     Phone:  [phone]     mirtazapine 30 MG tablet         Some of these will need a paper prescription and others can be bought over the counter.  Ask your nurse if you have questions.     Bring a paper prescription for each of these medications     amphetamine-dextroamphetamine 20 MG tablet    amphetamine-dextroamphetamine 20 MG tablet    amphetamine-dextroamphetamine 20 MG tablet                Primary Care Provider Office Phone # Fax #    [facility] [phone]        [address] [facility] [address]        Equal Access to Services     MARCUS KIRKPATRICK : Hadii aad ku hadfilippogustavo Estrada, wasimonda tomas, qadianne ortegamabobo ghotra, collette laragentryerica goff. So [facility] [phone].    ATENCIÓN: Si habla español, tiene a logan disposición servicios gratuitos de asistencia lingüística. Llame al [phone].    We comply with applicable federal civil rights laws and [place] laws. We do not discriminate on the basis of race, color, national origin, age, disability, sex, sexual orientation, or gender identity.            Thank you!     Thank you for choosing [facility]  for your care. Our goal is always to provide you with excellent care. Hearing back from our patients is one way we can continue to improve our services. Please take a few minutes to complete the written survey that you may receive in the mail after your visit with us. Thank you!             Your Updated Medication List - Protect others around you: Learn how to safely use, store and throw away your medicines at www.disposemymeds.org.          This list is accurate as of 12/3/18 11:32 AM.  Always use your most recent med list.                   Brand Name Dispense Instructions for use Diagnosis    * amphetamine-dextroamphetamine 20 MG tablet    ADDERALL    60 tablet    Take 1 tablet (20 mg) by mouth 2 times daily    Attention deficit hyperactivity disorder (ADHD), combined type       * amphetamine-dextroamphetamine 20 MG tablet    ADDERALL    30 tablet    Take 1 tablet (20 mg) by mouth daily    Attention deficit hyperactivity disorder (ADHD), combined type, Stimulant dependence (H)       * amphetamine-dextroamphetamine 20 MG tablet   Start taking on:  1/3/2019    ADDERALL    30 tablet    Take 1 tablet (20 mg) by mouth daily    Attention deficit hyperactivity disorder (ADHD), combined type, Stimulant dependence (H)       * amphetamine-dextroamphetamine 20 MG tablet   Start taking on:  2/3/2019     ADDERALL    30 tablet    Take 1 tablet (20 mg) by mouth daily    Attention deficit hyperactivity disorder (ADHD), combined type, Stimulant dependence (H)       buprenorphine-naloxone 8-2 MG Subl sublingual tablet   Start taking on:  12/26/2018    SUBOXONE     Place 2.5 tablets under the tongue        mirtazapine 30 MG tablet    REMERON    30 tablet    Take 1 tablet (30 mg) by mouth At Bedtime    Major depressive disorder, recurrent episode, moderate (H)       * Notice:  This list has 4 medication(s) that are the same as other medications prescribed for you. Read the directions carefully, and ask your doctor or other care provider to review them with you.

## 2018-12-04 ASSESSMENT — ANXIETY QUESTIONNAIRES: GAD7 TOTAL SCORE: 0

## 2018-12-10 DIAGNOSIS — F33.1 MAJOR DEPRESSIVE DISORDER, RECURRENT EPISODE, MODERATE (H): ICD-10-CM

## 2018-12-10 RX ORDER — MIRTAZAPINE 30 MG/1
30 TABLET, FILM COATED ORAL AT BEDTIME
Qty: 30 TABLET | Refills: 11 | OUTPATIENT
Start: 2018-12-10

## 2018-12-10 NOTE — TELEPHONE ENCOUNTER
"Requested Prescriptions   Pending Prescriptions Disp Refills     mirtazapine (REMERON) 30 MG tablet 30 tablet 11    Last Written Prescription Date:  12/3/18  Last Fill Quantity: 30,  # refills: 11   Last office visit: 12/1/2017 with prescribing provider: Mariusz  Future Office Visit:     Sig: Take 1 tablet (30 mg) by mouth At Bedtime    Atypical Antidepressants Protocol Failed - 12/10/2018  3:35 PM       Failed - Recent (6 mo) or future (30 days) visit within the authorizing provider's specialty    Patient had office visit in the last 6 months or has a visit in the next 30 days with authorizing provider or within the authorizing provider's specialty.  See \"Patient Info\" tab in inbasket, or \"Choose Columns\" in Meds & Orders section of the refill encounter.           Passed - Patient has PHQ-9 score less than 5 in past 6 months.    Please review last PHQ-9 score.          Passed - Patient is age 18 or older          "

## 2018-12-10 NOTE — TELEPHONE ENCOUNTER
Duplicate  Medication Detail      Disp Refills Start End EV   mirtazapine (REMERON) 30 MG tablet 30 tablet 11 12/3/2018  No   Sig - Route: Take 1 tablet (30 mg) by mouth At Bedtime - Oral   Sent to pharmacy as: mirtazapine (REMERON) 30 MG tablet   Class: E-Prescribe   Order: [id number]   E-Prescribing Status: Receipt confirmed by pharmacy (12/3/2018 11:25 AM CST)     Blank PERSAUD RN

## 2019-02-20 ENCOUNTER — OFFICE VISIT (OUTPATIENT)
Dept: URGENT CARE | Facility: URGENT CARE | Age: 39
End: 2019-02-20
Payer: COMMERCIAL

## 2019-02-20 VITALS
TEMPERATURE: 97.7 F | HEART RATE: 125 BPM | WEIGHT: 193 LBS | DIASTOLIC BLOOD PRESSURE: 81 MMHG | BODY MASS INDEX: 24.61 KG/M2 | SYSTOLIC BLOOD PRESSURE: 148 MMHG

## 2019-02-20 DIAGNOSIS — R03.0 ELEVATED BP WITHOUT DIAGNOSIS OF HYPERTENSION: ICD-10-CM

## 2019-02-20 DIAGNOSIS — M25.522 LEFT ELBOW PAIN: Primary | ICD-10-CM

## 2019-02-20 PROCEDURE — 99214 OFFICE O/P EST MOD 30 MIN: CPT | Performed by: INTERNAL MEDICINE

## 2019-02-20 RX ORDER — PREDNISONE 20 MG/1
40 TABLET ORAL DAILY
Qty: 10 TABLET | Refills: 0 | Status: SHIPPED | OUTPATIENT
Start: 2019-02-20 | End: 2019-02-28

## 2019-02-21 NOTE — PROGRESS NOTES
SUBJECTIVE:  Timmy Yousif is an 38 year old male who presents for elbow pain in left elbow.  Drives a lot and uses left arm on steering wheel a lot.  Has pain along back side of elbow.  Has been bothering him for about a year.  Today hurts more after snow-blowing.  But hurts every day.  No fevers, chills, sweats.  No redness or bruising.  Elbow on left looks different to him than on right.      PMH:   has a past medical history of Arthritis (December 2009) and Benign essential hypertension.  Patient Active Problem List   Diagnosis     CARDIOVASCULAR SCREENING; LDL GOAL LESS THAN 160     Lumbar disc herniation with radiculopathy     ELISHA (obstructive sleep apnea)     Benign essential hypertension     Opioid dependence in remission (H)     Attention deficit hyperactivity disorder (ADHD), combined type     Social History     Socioeconomic History     Marital status:      Spouse name: Not on file     Number of children: Not on file     Years of education: Not on file     Highest education level: Not on file   Occupational History     Not on file   Social Needs     Financial resource strain: Not on file     Food insecurity:     Worry: Not on file     Inability: Not on file     Transportation needs:     Medical: Not on file     Non-medical: Not on file   Tobacco Use     Smoking status: Never Smoker     Smokeless tobacco: Never Used   Substance and Sexual Activity     Alcohol use: Yes     Comment: occ     Drug use: No     Sexual activity: Yes     Partners: Female     Birth control/protection: Pull-out method, None   Lifestyle     Physical activity:     Days per week: Not on file     Minutes per session: Not on file     Stress: Not on file   Relationships     Social connections:     Talks on phone: Not on file     Gets together: Not on file     Attends Mandaen service: Not on file     Active member of club or organization: Not on file     Attends meetings of clubs or organizations: Not on file     Relationship  status: Not on file     Intimate partner violence:     Fear of current or ex partner: Not on file     Emotionally abused: Not on file     Physically abused: Not on file     Forced sexual activity: Not on file   Other Topics Concern     Parent/sibling w/ CABG, MI or angioplasty before 65F 55M? No   Social History Narrative     Not on file     Family History   Problem Relation Age of Onset     Cancer Father         bladder cancer     Other Cancer Father         Bladder     Diabetes Mother         Type 2-managed by diet       ALLERGIES:  Penicillin [penicillins]    Current Outpatient Medications   Medication     amphetamine-dextroamphetamine (ADDERALL) 20 MG tablet     amphetamine-dextroamphetamine (ADDERALL) 20 MG tablet     amphetamine-dextroamphetamine (ADDERALL) 20 MG tablet     amphetamine-dextroamphetamine (ADDERALL) 20 MG tablet     mirtazapine (REMERON) 30 MG tablet     No current facility-administered medications for this visit.          ROS:  ROS is done and is negative for general/constitutional, eye, ENT, Respiratory, cardiovascular, GI, , Skin, musculoskeletal except as noted elsewhere.  All other review of systems negative except as noted elsewhere.      OBJECTIVE:  /81   Pulse 125   Temp 97.7  F (36.5  C) (Oral)   Wt 87.5 kg (193 lb)   BMI 24.61 kg/m    GENERAL APPEARANCE: Alert, in no acute distress  EYES: normal  SKIN: no ulcers, lesions or rash  MUSCULOSKELETAL:left elbow - minimal edema of flexor surface, mildly limited active rom due to pain, elaine with flexion, which is slightly better with passive rom.  No erythema or bruising.  No localized tenderness.    RESULTS  .  No results found for this or any previous visit (from the past 48 hour(s)).    ASSESSMENT/PLAN:    ASSESSMENT / PLAN:  (M25.572) Left elbow pain  (primary encounter diagnosis)  Comment: c/w tendonitis.  Has worsened over past day or two due to snow removal.   Plan: [facility], AND [facility]IROP[facility]CT[facility] REFERRAL,         predniSONE  (DELTASONE) 20 MG tablet        Will tx with prednisone to see if settles inflammation.  Also advised icing and prn nsaids.  Refer to PT for further tx.  If not improve with PT over 2 weeks, f/u with ortho. I reviewed supportive care, otc meds to use if needed, expected course, and signs of concern.  Follow up as needed with ortho or if worsens in any way.  Reviewed red flag symptoms and is to go to the ER if experiences any of these.    (R03.0) Elevated BP without diagnosis of hypertension  Comment: likely due to pain  Plan: Recheck BP in 1-2 weeks with a nurse visit or a visit to primary care doctor.      See [facility] for orders, medications, letters, patient instructions    Margarita Rivas M.D.

## 2019-02-28 ENCOUNTER — OFFICE VISIT (OUTPATIENT)
Dept: FAMILY MEDICINE | Facility: CLINIC | Age: 39
End: 2019-02-28
Payer: COMMERCIAL

## 2019-02-28 VITALS
TEMPERATURE: 97.6 F | SYSTOLIC BLOOD PRESSURE: 137 MMHG | BODY MASS INDEX: 24.64 KG/M2 | OXYGEN SATURATION: 97 % | WEIGHT: 192 LBS | DIASTOLIC BLOOD PRESSURE: 83 MMHG | HEIGHT: 74 IN | HEART RATE: 92 BPM | RESPIRATION RATE: 16 BRPM

## 2019-02-28 DIAGNOSIS — F90.2 ATTENTION DEFICIT HYPERACTIVITY DISORDER (ADHD), COMBINED TYPE: Primary | ICD-10-CM

## 2019-02-28 PROCEDURE — 99214 OFFICE O/P EST MOD 30 MIN: CPT | Performed by: NURSE PRACTITIONER

## 2019-02-28 RX ORDER — DEXTROAMPHETAMINE SACCHARATE, AMPHETAMINE ASPARTATE, DEXTROAMPHETAMINE SULFATE AND AMPHETAMINE SULFATE 5; 5; 5; 5 MG/1; MG/1; MG/1; MG/1
20 TABLET ORAL DAILY
Qty: 30 TABLET | Refills: 0 | Status: SHIPPED | OUTPATIENT
Start: 2019-02-28 | End: 2019-03-27

## 2019-02-28 RX ORDER — DEXTROAMPHETAMINE SACCHARATE, AMPHETAMINE ASPARTATE, DEXTROAMPHETAMINE SULFATE AND AMPHETAMINE SULFATE 7.5; 7.5; 7.5; 7.5 MG/1; MG/1; MG/1; MG/1
30 TABLET ORAL DAILY
Qty: 30 TABLET | Refills: 0 | Status: SHIPPED | OUTPATIENT
Start: 2019-02-28 | End: 2019-03-27

## 2019-02-28 ASSESSMENT — MIFFLIN-ST. JEOR: SCORE: 1854.63

## 2019-02-28 NOTE — NURSING NOTE
"Chief Complaint   Patient presents with     A.D.H.D       Initial /83 (BP Location: Right arm, Patient Position: Chair, Cuff Size: Adult Large)   Pulse 92   Temp 97.6  F (36.4  C) (Tympanic)   Resp 16   Ht 1.886 m (6' 2.25\")   Wt 87.1 kg (192 lb)   SpO2 97%   BMI 24.49 kg/m   Estimated body mass index is 24.49 kg/m  as calculated from the following:    Height as of this encounter: 1.886 m (6' 2.25\").    Weight as of this encounter: 87.1 kg (192 lb).    Patient presents to the clinic using No DME    Health Maintenance that is potentially due pending provider review:  NONE        Is there anyone who you would like to be able to receive your results? No  If yes have patient fill out MADI      "

## 2019-02-28 NOTE — PROGRESS NOTES
SUBJECTIVE:   Timmy Yousif is a 38 year old male who presents to clinic today for the following health issues:      Medication Followup of Adderall    Taking Medication as prescribed: yes    Side Effects:  None    Medication Helping Symptoms:  Wondering if he could maybe change the dose today a little.     Less able to focus in the morning  No side effects from the medication    Problem list and histories reviewed & adjusted, as indicated.  Additional history: as documented    Patient Active Problem List   Diagnosis     CARDIOVASCULAR SCREENING; LDL GOAL LESS THAN 160     Lumbar disc herniation with radiculopathy     ELISHA (obstructive sleep apnea)     Benign essential hypertension     Opioid dependence in remission (H)     Attention deficit hyperactivity disorder (ADHD), combined type     History reviewed. No pertinent surgical history.    Social History     Tobacco Use     Smoking status: Never Smoker     Smokeless tobacco: Never Used   Substance Use Topics     Alcohol use: Yes     Comment: occ     Family History   Problem Relation Age of Onset     Cancer Father         bladder cancer     Other Cancer Father         Bladder     Diabetes Mother         Type 2-managed by diet         Current Outpatient Medications   Medication Sig Dispense Refill     amphetamine-dextroamphetamine (ADDERALL) 20 MG tablet Take 1 tablet (20 mg) by mouth 2 times daily 60 tablet 0     mirtazapine (REMERON) 30 MG tablet Take 1 tablet (30 mg) by mouth At Bedtime 30 tablet 11     amphetamine-dextroamphetamine (ADDERALL) 20 MG tablet Take 1 tablet (20 mg) by mouth 2 times daily 60 tablet 0     amphetamine-dextroamphetamine (ADDERALL) 20 MG tablet Take 1 tablet (20 mg) by mouth 2 times daily 60 tablet 0     amphetamine-dextroamphetamine (ADDERALL) 20 MG tablet Take 1 tablet (20 mg) by mouth 2 times daily 60 tablet 0     Allergies   Allergen Reactions     Penicillin [Penicillins] Rash     Labs reviewed in EPIC    Reviewed and updated as  "needed this visit by clinical staff       Reviewed and updated as needed this visit by Provider         ROS:  Constitutional, HEENT, cardiovascular, pulmonary, gi and gu systems are negative, except as otherwise noted.    OBJECTIVE:     /83 (BP Location: Right arm, Patient Position: Chair, Cuff Size: Adult Large)   Pulse 92   Temp 97.6  F (36.4  C) (Tympanic)   Resp 16   Ht 1.886 m (6' 2.25\")   Wt 87.1 kg (192 lb)   SpO2 97%   BMI 24.49 kg/m    Body mass index is 24.49 kg/m .  GENERAL: healthy, alert and no distress  RESP: lungs clear to auscultation - no rales, rhonchi or wheezes  CV: regular rate and rhythm, normal S1 S2, no S3 or S4, no murmur, click or rub, no peripheral edema and peripheral pulses strong  NEURO: Normal strength and tone, mentation intact and speech normal  PSYCH: mentation appears normal, affect normal/bright    Diagnostic Test Results:  none     ASSESSMENT/PLAN:     1. Attention deficit hyperactivity disorder (ADHD), combined type  Not controlled. Will increase the morning dose of the Adderal. If helping ok to call for 2 months of additional refills   - amphetamine-dextroamphetamine (ADDERALL) 30 MG tablet; Take 1 tablet (30 mg) by mouth daily In the morning  Dispense: 30 tablet; Refill: 0  - amphetamine-dextroamphetamine (ADDERALL) 20 MG tablet; Take 1 tablet (20 mg) by mouth daily In the afternoon  Dispense: 30 tablet; Refill: 0    Home care instructions were reviewed with the patient. The risks, benefits and treatment options of prescribed medications or other treatments have been discussed with the patient. The patient verbalized their understanding and should call or follow up if no improvement or if they develop further problems.      CARIE Morfin [facility]    "

## 2019-03-27 ENCOUNTER — MYC REFILL (OUTPATIENT)
Dept: FAMILY MEDICINE | Facility: CLINIC | Age: 39
End: 2019-03-27

## 2019-03-27 DIAGNOSIS — F90.2 ATTENTION DEFICIT HYPERACTIVITY DISORDER (ADHD), COMBINED TYPE: ICD-10-CM

## 2019-03-29 RX ORDER — DEXTROAMPHETAMINE SACCHARATE, AMPHETAMINE ASPARTATE, DEXTROAMPHETAMINE SULFATE AND AMPHETAMINE SULFATE 5; 5; 5; 5 MG/1; MG/1; MG/1; MG/1
20 TABLET ORAL DAILY
Qty: 30 TABLET | Refills: 0 | Status: SHIPPED | OUTPATIENT
Start: 2019-04-29 | End: 2019-08-28

## 2019-03-29 RX ORDER — DEXTROAMPHETAMINE SACCHARATE, AMPHETAMINE ASPARTATE, DEXTROAMPHETAMINE SULFATE AND AMPHETAMINE SULFATE 7.5; 7.5; 7.5; 7.5 MG/1; MG/1; MG/1; MG/1
30 TABLET ORAL DAILY
Qty: 30 TABLET | Refills: 0 | Status: SHIPPED | OUTPATIENT
Start: 2019-04-29 | End: 2019-08-28

## 2019-03-29 RX ORDER — DEXTROAMPHETAMINE SACCHARATE, AMPHETAMINE ASPARTATE, DEXTROAMPHETAMINE SULFATE AND AMPHETAMINE SULFATE 7.5; 7.5; 7.5; 7.5 MG/1; MG/1; MG/1; MG/1
30 TABLET ORAL DAILY
Qty: 30 TABLET | Refills: 0 | Status: SHIPPED | OUTPATIENT
Start: 2019-03-29 | End: 2019-05-30

## 2019-03-29 RX ORDER — DEXTROAMPHETAMINE SACCHARATE, AMPHETAMINE ASPARTATE, DEXTROAMPHETAMINE SULFATE AND AMPHETAMINE SULFATE 5; 5; 5; 5 MG/1; MG/1; MG/1; MG/1
20 TABLET ORAL DAILY
Qty: 30 TABLET | Refills: 0 | Status: SHIPPED | OUTPATIENT
Start: 2019-03-29 | End: 2019-05-30

## 2019-05-28 NOTE — PROGRESS NOTES
Subjective     Timmy Yousif is a 38 year old male who presents to clinic today for the following health issues:    HPI   ADHD Follow-Up    Date of last ADHD office visit: 2/28/2019  Status since last visit: Stable  Taking controlled (daily) medications as prescribed: Yes                       Parent/Patient Concerns with Medications: None  ADHD Medication     Amphetamines Disp Start End     amphetamine-dextroamphetamine (ADDERALL) 20 MG tablet    30 tablet 5/30/2019 6/29/2019    Sig - Route: Take 1 tablet (20 mg) by mouth daily In afternoon - Oral    Class: Local Print    Earliest Fill Date: 5/30/2019     amphetamine-dextroamphetamine (ADDERALL) 20 MG tablet    30 tablet 6/28/2019 7/28/2019    Sig - Route: Take 1 tablet (20 mg) by mouth daily In afternoon - Oral    Class: Local Print    Earliest Fill Date: 6/25/2019     amphetamine-dextroamphetamine (ADDERALL) 20 MG tablet    30 tablet 7/29/2019 8/28/2019    Sig - Route: Take 1 tablet (20 mg) by mouth daily In afternoon - Oral    Class: Local Print    Earliest Fill Date: 7/26/2019     amphetamine-dextroamphetamine (ADDERALL) 20 MG tablet    30 tablet 4/29/2019     Sig - Route: Take 1 tablet (20 mg) by mouth daily In the afternoon - Oral    Class: Local Print    Earliest Fill Date: 4/29/2019     amphetamine-dextroamphetamine (ADDERALL) 30 MG tablet    30 tablet 5/30/2019 6/29/2019    Sig - Route: Take 1 tablet (30 mg) by mouth daily In morning - Oral    Class: Local Print    Earliest Fill Date: 5/30/2019     amphetamine-dextroamphetamine (ADDERALL) 30 MG tablet    30 tablet 6/28/2019 7/28/2019    Sig - Route: Take 1 tablet (30 mg) by mouth daily In morning - Oral    Class: Local Print    Earliest Fill Date: 6/25/2019     amphetamine-dextroamphetamine (ADDERALL) 30 MG tablet    30 tablet 7/29/2019 8/28/2019    Sig - Route: Take 1 tablet (30 mg) by mouth daily In morning - Oral    Class: Local Print    Earliest Fill Date: 7/26/2019     amphetamine-dextroamphetamine  "(ADDERALL) 30 MG tablet    30 tablet 4/29/2019     Sig - Route: Take 1 tablet (30 mg) by mouth daily In the morning - Oral    Class: Local Print    Earliest Fill Date: 4/29/2019          Sleep: no problems    Medication Benefits:   Controlled symptoms: Attention span, Distractability and Finishing tasks  Uncontrolled Symptoms: None    Medication side effects:  Side effects noted: none  Denies: appetite suppression, weight loss, insomnia, tics, palpitations, stomach ache, headache, emotional lability, rebound irritability, drowsiness, \"zombie\" effect, growth suppression and dry mouth      Patient Active Problem List   Diagnosis     CARDIOVASCULAR SCREENING; LDL GOAL LESS THAN 160     Lumbar disc herniation with radiculopathy     ELISHA (obstructive sleep apnea)     Benign essential hypertension     Opioid dependence in remission (H)     Attention deficit hyperactivity disorder (ADHD), combined type     History reviewed. No pertinent surgical history.    Social History     Tobacco Use     Smoking status: Never Smoker     Smokeless tobacco: Never Used   Substance Use Topics     Alcohol use: Yes     Comment: occ     Family History   Problem Relation Age of Onset     Cancer Father         bladder cancer     Other Cancer Father         Bladder     Diabetes Mother         Type 2-managed by diet         Current Outpatient Medications   Medication Sig Dispense Refill     amphetamine-dextroamphetamine (ADDERALL) 20 MG tablet Take 1 tablet (20 mg) by mouth daily In afternoon 30 tablet 0     [START ON 6/28/2019] amphetamine-dextroamphetamine (ADDERALL) 20 MG tablet Take 1 tablet (20 mg) by mouth daily In afternoon 30 tablet 0     [START ON 7/29/2019] amphetamine-dextroamphetamine (ADDERALL) 20 MG tablet Take 1 tablet (20 mg) by mouth daily In afternoon 30 tablet 0     amphetamine-dextroamphetamine (ADDERALL) 20 MG tablet Take 1 tablet (20 mg) by mouth daily In the afternoon 30 tablet 0     amphetamine-dextroamphetamine (ADDERALL) " "30 MG tablet Take 1 tablet (30 mg) by mouth daily In morning 30 tablet 0     [START ON 6/28/2019] amphetamine-dextroamphetamine (ADDERALL) 30 MG tablet Take 1 tablet (30 mg) by mouth daily In morning 30 tablet 0     [START ON 7/29/2019] amphetamine-dextroamphetamine (ADDERALL) 30 MG tablet Take 1 tablet (30 mg) by mouth daily In morning 30 tablet 0     amphetamine-dextroamphetamine (ADDERALL) 30 MG tablet Take 1 tablet (30 mg) by mouth daily In the morning 30 tablet 0     mirtazapine (REMERON) 30 MG tablet Take 1 tablet (30 mg) by mouth At Bedtime 30 tablet 11     Allergies   Allergen Reactions     Penicillin [Penicillins] Rash       Reviewed and updated as needed this visit by Provider  Tobacco  Allergies  Meds  Problems  Med Hx  Surg Hx  Fam Hx         Review of Systems   ROS COMP: Constitutional, HEENT, cardiovascular, pulmonary, gi and gu systems are negative, except as otherwise noted.      Objective    /82   Pulse 92   Temp 97.1  F (36.2  C) (Tympanic)   Resp 16   Ht 1.881 m (6' 2.05\")   Wt 85.3 kg (188 lb)   BMI 24.11 kg/m    Body mass index is 24.11 kg/m .  Physical Exam   GENERAL: healthy, alert and no distress  RESP: lungs clear to auscultation - no rales, rhonchi or wheezes  CV: regular rate and rhythm, normal S1 S2, no S3 or S4, no murmur  NEURO: Normal strength and tone, mentation intact and speech normal  PSYCH: mentation appears normal, affect normal/bright    Diagnostic Test Results:  Labs reviewed in Epic        Assessment & Plan     1. Attention deficit hyperactivity disorder (ADHD), combined type  Stable with medication, no changes today  - amphetamine-dextroamphetamine (ADDERALL) 30 MG tablet; Take 1 tablet (30 mg) by mouth daily In morning  Dispense: 30 tablet; Refill: 0  - amphetamine-dextroamphetamine (ADDERALL) 30 MG tablet; Take 1 tablet (30 mg) by mouth daily In morning  Dispense: 30 tablet; Refill: 0  - amphetamine-dextroamphetamine (ADDERALL) 30 MG tablet; Take 1 tablet " (30 mg) by mouth daily In morning  Dispense: 30 tablet; Refill: 0  - amphetamine-dextroamphetamine (ADDERALL) 20 MG tablet; Take 1 tablet (20 mg) by mouth daily In afternoon  Dispense: 30 tablet; Refill: 0  - amphetamine-dextroamphetamine (ADDERALL) 20 MG tablet; Take 1 tablet (20 mg) by mouth daily In afternoon  Dispense: 30 tablet; Refill: 0  - amphetamine-dextroamphetamine (ADDERALL) 20 MG tablet; Take 1 tablet (20 mg) by mouth daily In afternoon  Dispense: 30 tablet; Refill: 0     Home care instructions were reviewed with the patient. The risks, benefits and treatment options of prescribed medications or other treatments have been discussed with the patient. The patient verbalized their understanding and should call or follow up if no improvement or if they develop further problems.    There are no Patient Instructions on file for this visit.    Return in about 3 months (around 8/30/2019) for ADHD.    CARIE Morfin [facility]      Answers for HPI/ROS submitted by the patient on 5/30/2019   If you checked off any problems, how difficult have these problems made it for you to do your work, take care of things at home, or get along with other people?: Not difficult at all  PHQ9 TOTAL SCORE: 0

## 2019-05-30 ENCOUNTER — OFFICE VISIT (OUTPATIENT)
Dept: FAMILY MEDICINE | Facility: CLINIC | Age: 39
End: 2019-05-30
Payer: COMMERCIAL

## 2019-05-30 VITALS
DIASTOLIC BLOOD PRESSURE: 82 MMHG | HEART RATE: 92 BPM | TEMPERATURE: 97.1 F | HEIGHT: 74 IN | BODY MASS INDEX: 24.13 KG/M2 | WEIGHT: 188 LBS | SYSTOLIC BLOOD PRESSURE: 124 MMHG | RESPIRATION RATE: 16 BRPM

## 2019-05-30 DIAGNOSIS — F90.2 ATTENTION DEFICIT HYPERACTIVITY DISORDER (ADHD), COMBINED TYPE: Primary | ICD-10-CM

## 2019-05-30 PROCEDURE — 99213 OFFICE O/P EST LOW 20 MIN: CPT | Performed by: NURSE PRACTITIONER

## 2019-05-30 RX ORDER — DEXTROAMPHETAMINE SACCHARATE, AMPHETAMINE ASPARTATE, DEXTROAMPHETAMINE SULFATE AND AMPHETAMINE SULFATE 5; 5; 5; 5 MG/1; MG/1; MG/1; MG/1
20 TABLET ORAL DAILY
Qty: 30 TABLET | Refills: 0 | Status: SHIPPED | OUTPATIENT
Start: 2019-05-30 | End: 2019-08-28

## 2019-05-30 RX ORDER — DEXTROAMPHETAMINE SACCHARATE, AMPHETAMINE ASPARTATE, DEXTROAMPHETAMINE SULFATE AND AMPHETAMINE SULFATE 7.5; 7.5; 7.5; 7.5 MG/1; MG/1; MG/1; MG/1
30 TABLET ORAL DAILY
Qty: 30 TABLET | Refills: 0 | Status: SHIPPED | OUTPATIENT
Start: 2019-05-30 | End: 2019-08-28

## 2019-05-30 RX ORDER — DEXTROAMPHETAMINE SACCHARATE, AMPHETAMINE ASPARTATE, DEXTROAMPHETAMINE SULFATE AND AMPHETAMINE SULFATE 7.5; 7.5; 7.5; 7.5 MG/1; MG/1; MG/1; MG/1
30 TABLET ORAL DAILY
Qty: 30 TABLET | Refills: 0 | Status: SHIPPED | OUTPATIENT
Start: 2019-07-29 | End: 2019-08-23

## 2019-05-30 RX ORDER — DEXTROAMPHETAMINE SACCHARATE, AMPHETAMINE ASPARTATE, DEXTROAMPHETAMINE SULFATE AND AMPHETAMINE SULFATE 5; 5; 5; 5 MG/1; MG/1; MG/1; MG/1
20 TABLET ORAL DAILY
Qty: 30 TABLET | Refills: 0 | Status: SHIPPED | OUTPATIENT
Start: 2019-07-29 | End: 2019-08-23

## 2019-05-30 RX ORDER — DEXTROAMPHETAMINE SACCHARATE, AMPHETAMINE ASPARTATE, DEXTROAMPHETAMINE SULFATE AND AMPHETAMINE SULFATE 5; 5; 5; 5 MG/1; MG/1; MG/1; MG/1
20 TABLET ORAL DAILY
Qty: 30 TABLET | Refills: 0 | Status: SHIPPED | OUTPATIENT
Start: 2019-06-28 | End: 2019-08-28

## 2019-05-30 RX ORDER — DEXTROAMPHETAMINE SACCHARATE, AMPHETAMINE ASPARTATE, DEXTROAMPHETAMINE SULFATE AND AMPHETAMINE SULFATE 7.5; 7.5; 7.5; 7.5 MG/1; MG/1; MG/1; MG/1
30 TABLET ORAL DAILY
Qty: 30 TABLET | Refills: 0 | Status: SHIPPED | OUTPATIENT
Start: 2019-06-28 | End: 2019-08-28

## 2019-05-30 ASSESSMENT — PATIENT HEALTH QUESTIONNAIRE - PHQ9
SUM OF ALL RESPONSES TO PHQ QUESTIONS 1-9: 0
10. IF YOU CHECKED OFF ANY PROBLEMS, HOW DIFFICULT HAVE THESE PROBLEMS MADE IT FOR YOU TO DO YOUR WORK, TAKE CARE OF THINGS AT HOME, OR GET ALONG WITH OTHER PEOPLE: NOT DIFFICULT AT ALL
SUM OF ALL RESPONSES TO PHQ QUESTIONS 1-9: 0

## 2019-05-30 ASSESSMENT — MIFFLIN-ST. JEOR: SCORE: 1833.3

## 2019-05-31 ASSESSMENT — PATIENT HEALTH QUESTIONNAIRE - PHQ9: SUM OF ALL RESPONSES TO PHQ QUESTIONS 1-9: 0

## 2019-07-26 ENCOUNTER — TELEPHONE (OUTPATIENT)
Dept: FAMILY MEDICINE | Facility: CLINIC | Age: 39
End: 2019-07-26

## 2019-07-26 NOTE — TELEPHONE ENCOUNTER
[facility] [place] says Libia Krishna wrote Rx fr Adderall 20mg and 30 mg with start date of 7/29/19. Pharmacy says the last fill date was 6/27/19 for 30 days so is asking if this can be filled 2 days early on 7/27/19.  Verbal OK given by Dr Cooper.  Pharmacy notified.

## 2019-08-23 ENCOUNTER — MYC REFILL (OUTPATIENT)
Dept: FAMILY MEDICINE | Facility: CLINIC | Age: 39
End: 2019-08-23

## 2019-08-23 ENCOUNTER — MYC MEDICAL ADVICE (OUTPATIENT)
Dept: FAMILY MEDICINE | Facility: CLINIC | Age: 39
End: 2019-08-23

## 2019-08-23 DIAGNOSIS — F90.2 ATTENTION DEFICIT HYPERACTIVITY DISORDER (ADHD), COMBINED TYPE: ICD-10-CM

## 2019-08-23 RX ORDER — DEXTROAMPHETAMINE SACCHARATE, AMPHETAMINE ASPARTATE, DEXTROAMPHETAMINE SULFATE AND AMPHETAMINE SULFATE 5; 5; 5; 5 MG/1; MG/1; MG/1; MG/1
20 TABLET ORAL DAILY
Qty: 30 TABLET | Refills: 0 | Status: SHIPPED | OUTPATIENT
Start: 2019-08-23 | End: 2019-08-28

## 2019-08-23 RX ORDER — DEXTROAMPHETAMINE SACCHARATE, AMPHETAMINE ASPARTATE, DEXTROAMPHETAMINE SULFATE AND AMPHETAMINE SULFATE 7.5; 7.5; 7.5; 7.5 MG/1; MG/1; MG/1; MG/1
30 TABLET ORAL DAILY
Qty: 30 TABLET | Refills: 0 | Status: SHIPPED | OUTPATIENT
Start: 2019-08-23 | End: 2019-09-27

## 2019-08-23 NOTE — TELEPHONE ENCOUNTER
[place] reviewed:  reviewed- no concerns    [place]:  [email or web address]

## 2019-09-26 ENCOUNTER — MYC MEDICAL ADVICE (OUTPATIENT)
Dept: FAMILY MEDICINE | Facility: CLINIC | Age: 39
End: 2019-09-26

## 2019-09-26 DIAGNOSIS — F90.2 ATTENTION DEFICIT HYPERACTIVITY DISORDER (ADHD), COMBINED TYPE: ICD-10-CM

## 2019-09-27 RX ORDER — DEXTROAMPHETAMINE SACCHARATE, AMPHETAMINE ASPARTATE, DEXTROAMPHETAMINE SULFATE AND AMPHETAMINE SULFATE 7.5; 7.5; 7.5; 7.5 MG/1; MG/1; MG/1; MG/1
30 TABLET ORAL DAILY
Qty: 30 TABLET | Refills: 0 | Status: SHIPPED | OUTPATIENT
Start: 2019-09-27 | End: 2019-09-30

## 2019-09-27 RX ORDER — DEXTROAMPHETAMINE SACCHARATE, AMPHETAMINE ASPARTATE, DEXTROAMPHETAMINE SULFATE AND AMPHETAMINE SULFATE 5; 5; 5; 5 MG/1; MG/1; MG/1; MG/1
20 TABLET ORAL DAILY
Qty: 30 TABLET | Refills: 0 | Status: SHIPPED | OUTPATIENT
Start: 2019-09-27 | End: 2019-09-30

## 2019-09-30 ENCOUNTER — OFFICE VISIT (OUTPATIENT)
Dept: FAMILY MEDICINE | Facility: CLINIC | Age: 39
End: 2019-09-30
Payer: COMMERCIAL

## 2019-09-30 VITALS
DIASTOLIC BLOOD PRESSURE: 88 MMHG | BODY MASS INDEX: 24 KG/M2 | SYSTOLIC BLOOD PRESSURE: 138 MMHG | TEMPERATURE: 97.4 F | HEIGHT: 74 IN | RESPIRATION RATE: 16 BRPM | HEART RATE: 103 BPM | WEIGHT: 187 LBS

## 2019-09-30 DIAGNOSIS — I10 BENIGN ESSENTIAL HYPERTENSION: Primary | ICD-10-CM

## 2019-09-30 DIAGNOSIS — F90.2 ATTENTION DEFICIT HYPERACTIVITY DISORDER (ADHD), COMBINED TYPE: ICD-10-CM

## 2019-09-30 PROCEDURE — 99213 OFFICE O/P EST LOW 20 MIN: CPT | Performed by: NURSE PRACTITIONER

## 2019-09-30 RX ORDER — DEXTROAMPHETAMINE SACCHARATE, AMPHETAMINE ASPARTATE, DEXTROAMPHETAMINE SULFATE AND AMPHETAMINE SULFATE 7.5; 7.5; 7.5; 7.5 MG/1; MG/1; MG/1; MG/1
30 TABLET ORAL DAILY
Qty: 30 TABLET | Refills: 0 | Status: SHIPPED | OUTPATIENT
Start: 2019-11-25 | End: 2019-12-20

## 2019-09-30 RX ORDER — DEXTROAMPHETAMINE SACCHARATE, AMPHETAMINE ASPARTATE, DEXTROAMPHETAMINE SULFATE AND AMPHETAMINE SULFATE 7.5; 7.5; 7.5; 7.5 MG/1; MG/1; MG/1; MG/1
30 TABLET ORAL DAILY
Qty: 30 TABLET | Refills: 0 | Status: SHIPPED | OUTPATIENT
Start: 2019-10-27 | End: 2020-01-27

## 2019-09-30 RX ORDER — DEXTROAMPHETAMINE SACCHARATE, AMPHETAMINE ASPARTATE, DEXTROAMPHETAMINE SULFATE AND AMPHETAMINE SULFATE 5; 5; 5; 5 MG/1; MG/1; MG/1; MG/1
20 TABLET ORAL DAILY
Qty: 30 TABLET | Refills: 0 | Status: SHIPPED | OUTPATIENT
Start: 2019-10-27 | End: 2019-12-20

## 2019-09-30 RX ORDER — DEXTROAMPHETAMINE SACCHARATE, AMPHETAMINE ASPARTATE, DEXTROAMPHETAMINE SULFATE AND AMPHETAMINE SULFATE 5; 5; 5; 5 MG/1; MG/1; MG/1; MG/1
20 TABLET ORAL DAILY
Qty: 30 TABLET | Refills: 0 | Status: SHIPPED | OUTPATIENT
Start: 2019-11-25 | End: 2020-01-27

## 2019-09-30 ASSESSMENT — MIFFLIN-ST. JEOR: SCORE: 1831.95

## 2019-09-30 NOTE — PROGRESS NOTES
"Subjective     Timmy Yousif is a 38 year old male who presents to clinic today for the following health issues:    HPI   ADHD Follow-Up    Date of last ADHD office visit: 5/30/2019  Status since last visit: Stable  Taking controlled (daily) medications as prescribed: Yes                       Parent/Patient Concerns with Medications: None  ADHD Medication     Amphetamines Disp Start End     amphetamine-dextroamphetamine (ADDERALL) 20 MG tablet    30 tablet 11/25/2019     Sig - Route: Take 1 tablet (20 mg) by mouth daily In the afternoon - Oral    Class: E-Prescribe    Earliest Fill Date: 11/25/2019     amphetamine-dextroamphetamine (ADDERALL) 20 MG tablet    30 tablet 10/27/2019     Sig - Route: Take 1 tablet (20 mg) by mouth daily In the afternoon - Oral    Class: E-Prescribe    Earliest Fill Date: 10/27/2019     amphetamine-dextroamphetamine (ADDERALL) 30 MG tablet    30 tablet 11/25/2019     Sig - Route: Take 1 tablet (30 mg) by mouth daily In morning - Oral    Class: E-Prescribe    Earliest Fill Date: 11/25/2019     amphetamine-dextroamphetamine (ADDERALL) 30 MG tablet    30 tablet 10/27/2019     Sig - Route: Take 1 tablet (30 mg) by mouth daily - Oral    Class: E-Prescribe    Earliest Fill Date: 10/27/2019    Notes to Pharmacy: In the morning          Sleep: no problems    Medication Benefits:   Controlled symptoms: Attention span, Distractability and Finishing tasks  Uncontrolled Symptoms: None    Medication side effects:  Side effects noted: none  Denies: appetite suppression, weight loss, insomnia, tics, palpitations, stomach ache, headache, emotional lability, rebound irritability, drowsiness, \"zombie\" effect, growth suppression and dry mouth    Patient Active Problem List   Diagnosis     CARDIOVASCULAR SCREENING; LDL GOAL LESS THAN 160     Lumbar disc herniation with radiculopathy     ELISHA (obstructive sleep apnea)     Benign essential hypertension     Opioid dependence in remission (H)     Attention " "deficit hyperactivity disorder (ADHD), combined type     History reviewed. No pertinent surgical history.    Social History     Tobacco Use     Smoking status: Never Smoker     Smokeless tobacco: Never Used   Substance Use Topics     Alcohol use: Yes     Comment: occ     Family History   Problem Relation Age of Onset     Cancer Father         bladder cancer     Other Cancer Father         Bladder     Diabetes Mother         Type 2-managed by diet         Current Outpatient Medications   Medication Sig Dispense Refill     [START ON 11/25/2019] amphetamine-dextroamphetamine (ADDERALL) 20 MG tablet Take 1 tablet (20 mg) by mouth daily In the afternoon 30 tablet 0     [START ON 10/27/2019] amphetamine-dextroamphetamine (ADDERALL) 20 MG tablet Take 1 tablet (20 mg) by mouth daily In the afternoon 30 tablet 0     [START ON 11/25/2019] amphetamine-dextroamphetamine (ADDERALL) 30 MG tablet Take 1 tablet (30 mg) by mouth daily In morning 30 tablet 0     [START ON 10/27/2019] amphetamine-dextroamphetamine (ADDERALL) 30 MG tablet Take 1 tablet (30 mg) by mouth daily 30 tablet 0     Allergies   Allergen Reactions     Penicillin [Penicillins] Rash     Reviewed and updated as needed this visit by Provider  Tobacco  Allergies  Meds  Problems  Med Hx  Surg Hx  Fam Hx         Review of Systems   ROS COMP: Constitutional, HEENT, cardiovascular, pulmonary, gi and gu systems are negative, except as otherwise noted.      Objective    /88   Pulse 103   Temp 97.4  F (36.3  C) (Tympanic)   Resp 16   Ht 1.886 m (6' 2.25\")   Wt 84.8 kg (187 lb)   BMI 23.85 kg/m    Body mass index is 23.85 kg/m .  Physical Exam   GENERAL: healthy, alert and no distress  RESP: lungs clear to auscultation - no rales, rhonchi or wheezes  CV: regular rate and rhythm, normal S1 S2, no S3 or S4, no murmur, click or rub  NEURO: Normal strength and tone, mentation intact and speech normal  PSYCH: mentation appears normal, affect " normal/bright    Diagnostic Test Results:  none         Assessment & Plan     1. Attention deficit hyperactivity disorder (ADHD), combined type  Stable, Adderall refilled.   - amphetamine-dextroamphetamine (ADDERALL) 20 MG tablet; Take 1 tablet (20 mg) by mouth daily In the afternoon  Dispense: 30 tablet; Refill: 0  - amphetamine-dextroamphetamine (ADDERALL) 30 MG tablet; Take 1 tablet (30 mg) by mouth daily In morning  Dispense: 30 tablet; Refill: 0  - amphetamine-dextroamphetamine (ADDERALL) 30 MG tablet; Take 1 tablet (30 mg) by mouth daily  Dispense: 30 tablet; Refill: 0  - amphetamine-dextroamphetamine (ADDERALL) 20 MG tablet; Take 1 tablet (20 mg) by mouth daily In the afternoon  Dispense: 30 tablet; Refill: 0    2. Benign essential hypertension  Blood pressure elevated in clinic, recheck ok.  Monitor at home and MyChart results.  May need to consider restart of lisinopril if continues to be elevated.    Home care instructions were reviewed with the patient. The risks, benefits and treatment options of prescribed medications or other treatments have been discussed with the patient. The patient verbalized their understanding and should call or follow up if no improvement or if they develop further problems.    Return in about 2 weeks (around 10/14/2019) for BP Recheck.    CARIE Morfin [facility]

## 2019-10-21 ENCOUNTER — E-VISIT (OUTPATIENT)
Dept: FAMILY MEDICINE | Facility: CLINIC | Age: 39
End: 2019-10-21
Payer: COMMERCIAL

## 2019-10-21 DIAGNOSIS — S39.012A STRAIN OF LUMBAR REGION, INITIAL ENCOUNTER: Primary | ICD-10-CM

## 2019-10-21 PROCEDURE — 99444 ZZC PHYSICIAN ONLINE EVALUATION & MANAGEMENT SERVICE: CPT | Performed by: NURSE PRACTITIONER

## 2019-10-21 RX ORDER — CYCLOBENZAPRINE HCL 10 MG
10 TABLET ORAL 3 TIMES DAILY PRN
Qty: 30 TABLET | Refills: 0 | Status: SHIPPED | OUTPATIENT
Start: 2019-10-21 | End: 2020-01-27

## 2019-10-21 RX ORDER — PREDNISONE 20 MG/1
20 TABLET ORAL 2 TIMES DAILY
Qty: 10 TABLET | Refills: 0 | Status: SHIPPED | OUTPATIENT
Start: 2019-10-21 | End: 2020-01-27

## 2019-10-21 NOTE — PATIENT INSTRUCTIONS
Caring for Your Back    You are not alone.    Low back pain is very common. Nearly half of all adults will have low back pain in any given year. The good news is that back pain is rarely a danger to your health. Most people can manage their back pain on their own. About half of people start feeling better within two weeks. In 9 out of 10 cases, low back pain goes away or no longer limits daily activity within 6 weeks.     Your outlook is good!     Your symptoms tell us that your low back pain is most likely not a danger to you. Most of the time we will not know the exact cause of low back pain, even if you see a doctor or have an MRI. However, treatment can still work without knowing the cause of the pain. Less than 1 in 100 people need surgery for their back pain.     What can I do about my low back pain?     There are three basic things you can do to ease low back pain and help it go away.     Use heat or cold packs.    Take medicine as directed.    Use positions, movements and exercises.    Using heat or cold packs:    Try cold packs or gentle heat to ease your pain.  Use whichever gives you the most relief. Apply the cold pack or heat for 15 minutes at a time, as often as needed.    Taking medicine:    If taking over-the-counter medicine:    Take ibuprofen (Advil, Motrin) 600 mg three times a day as needed for pain.  OR    Take Aleve (naproxen) 220 to 440 mg two times a day as needed for pain. If your doctor prescribed a muscle relaxant (cyclobenzaprine 10 mg.):    Take   to 1 tablet at bedtime.    Do not drive when taking this medicine. This drug may make you sleepy.     Using positions, movements and exercises:    Research tells us that moving your joints and muscles can help you recover from back pain. Such activity should be simple and gentle. Use the positions below as well as walking to help relieve your pain. Try taking a short walk every 3 to 4 hours during the day. Walk for a few minutes inside your  home or take longer walks outside, on a treadmill or at a mall. Slowly increase the amount of time you walk. Expect discomfort when you begin, but it should lessen as your back starts to heal. When your back feels better, walk daily to keep your back and body healthy.    Finding a position that is comfortable:    When your back pain is new, certain positions will ease your pain. Gently try each of the positions below until you find one that is helpful. Once you find a position of comfort, use it as often as you like when you are resting. You will recover faster if you combine rest with activity.    * Lie on your back with your legs bent. You can do this by placing a pillow under your knees or lie on the floor and rest your lower legs on the seat of a chair.  * Lie on your side with your knees bent and place a pillow between your knees.    Lie on your stomach over pillows.       When should I call my doctor?    Your back pain should improve over the first couple of weeks. As it improves, you should be able to return to your normal activities.  But call your doctor if:      You have a sudden change in your ability to control your bladder or bowels.    You begin to feel tingling in your groin or legs.    The pain spreads down your leg and into your foot.    Your toes, feet or leg muscles begin to feel weak.    You feel generally unwell or sick.    Your pain gets worse.    If you are deaf or hard of hearing, please let us know. We provide many free services including sign language interpreters, oral interpreters, TTYs, telephone amplifiers, note takers and written materials.    For informational purposes only. Not to replace the advice of your health care provider. Copyright   2013 [facility]. All rights reserved. [employer] [id number] - 04/13.

## 2019-10-24 ENCOUNTER — MYC MEDICAL ADVICE (OUTPATIENT)
Dept: FAMILY MEDICINE | Facility: CLINIC | Age: 39
End: 2019-10-24

## 2019-12-16 NOTE — TELEPHONE ENCOUNTER
"Requested Prescriptions   Pending Prescriptions Disp Refills     mirtazapine (REMERON) 30 MG tablet [Pharmacy Med Name: MIRTAZAPINE 30 MG TABLET] 90 tablet 2     Sig: TAKE 1 TABLET BY MOUTH AT BEDTIME       Atypical Antidepressants Protocol Failed - 12/16/2019  2:03 AM        Failed - Medication active on med list        Passed - Recent (12 mo) or future (30 days) visit within the authorizing provider's specialty     Patient has had an office visit with the authorizing provider or a provider within the authorizing providers department within the previous 12 mos or has a future within next 30 days. See \"Patient Info\" tab in inbasket, or \"Choose Columns\" in Meds & Orders section of the refill encounter.              Passed - Patient is age 18 or older        This patient wants a new prescription for MIRTAZAPINE 30 MG TABLET    "

## 2019-12-17 ENCOUNTER — MYC MEDICAL ADVICE (OUTPATIENT)
Dept: FAMILY MEDICINE | Facility: CLINIC | Age: 39
End: 2019-12-17

## 2019-12-17 DIAGNOSIS — G47.00 INSOMNIA: Primary | ICD-10-CM

## 2019-12-19 RX ORDER — MIRTAZAPINE 30 MG/1
TABLET, FILM COATED ORAL
Qty: 90 TABLET | Refills: 2 | OUTPATIENT
Start: 2019-12-19

## 2019-12-20 ENCOUNTER — MYC REFILL (OUTPATIENT)
Dept: FAMILY MEDICINE | Facility: CLINIC | Age: 39
End: 2019-12-20

## 2019-12-20 ENCOUNTER — MYC MEDICAL ADVICE (OUTPATIENT)
Dept: FAMILY MEDICINE | Facility: CLINIC | Age: 39
End: 2019-12-20

## 2019-12-20 DIAGNOSIS — F90.2 ATTENTION DEFICIT HYPERACTIVITY DISORDER (ADHD), COMBINED TYPE: ICD-10-CM

## 2019-12-20 RX ORDER — DEXTROAMPHETAMINE SACCHARATE, AMPHETAMINE ASPARTATE, DEXTROAMPHETAMINE SULFATE AND AMPHETAMINE SULFATE 5; 5; 5; 5 MG/1; MG/1; MG/1; MG/1
20 TABLET ORAL DAILY
Qty: 30 TABLET | Refills: 0 | Status: SHIPPED | OUTPATIENT
Start: 2019-12-20 | End: 2020-01-27

## 2019-12-20 RX ORDER — DEXTROAMPHETAMINE SACCHARATE, AMPHETAMINE ASPARTATE, DEXTROAMPHETAMINE SULFATE AND AMPHETAMINE SULFATE 7.5; 7.5; 7.5; 7.5 MG/1; MG/1; MG/1; MG/1
30 TABLET ORAL DAILY
Qty: 30 TABLET | Refills: 0 | Status: SHIPPED | OUTPATIENT
Start: 2019-12-20 | End: 2020-01-27

## 2019-12-20 RX ORDER — MIRTAZAPINE 30 MG/1
30 TABLET, FILM COATED ORAL AT BEDTIME
Qty: 90 TABLET | Refills: 0 | Status: SHIPPED | OUTPATIENT
Start: 2019-12-20 | End: 2019-12-26

## 2019-12-20 RX ORDER — DEXTROAMPHETAMINE SACCHARATE, AMPHETAMINE ASPARTATE, DEXTROAMPHETAMINE SULFATE AND AMPHETAMINE SULFATE 5; 5; 5; 5 MG/1; MG/1; MG/1; MG/1
20 TABLET ORAL DAILY
Qty: 30 TABLET | Refills: 0 | OUTPATIENT
Start: 2019-12-20

## 2019-12-20 NOTE — TELEPHONE ENCOUNTER
mirtazapine (REMERON) 30 MG tablet (Discontinued) 30 tablet 11 12/3/2018 8/28/2019 No   Sig - Route: Take 1 tablet (30 mg) by mouth At Bedtime - Oral   Sent to pharmacy as: mirtazapine (REMERON) 30 MG tablet   Class: E-Prescribe   Order: [id number]   E-Prescribing Status: Receipt confirmed by pharmacy (12/3/2018 11:25 AM CST)   Printout Tracking     Per Libia marquez to send refill. Script sent. Inge Haddad RN

## 2019-12-20 NOTE — TELEPHONE ENCOUNTER
Please see [employer] messages. Pt is requesting med he is taking that is not current on med list.    Blank PERSAUD RN

## 2019-12-20 NOTE — TELEPHONE ENCOUNTER
12/16/2019  1   12/16/2019  Buprenorphin-Naloxon 8-2 Mg Sl  10.00 5 [facility]  [id number]  All (1261)  0/0 16.00 mg [place]  [place]   11/25/2019  2   09/30/2019  Dextroamp-Amphetamin 30 Mg Tab  30.00 30 Ka Mar  [id number]  Andrew ([id number])  0/0  [place]   11/25/2019  2   09/30/2019  Dextroamp-Amphetamin 20 Mg Tab  30.00 30 Ka Mar  [id number]  Andrew ([id number])  0/0  [place]     [place] reviewed:  reviewed- no concerns    [place]  [email or web address]

## 2019-12-26 DIAGNOSIS — G47.00 INSOMNIA: ICD-10-CM

## 2019-12-26 RX ORDER — MIRTAZAPINE 30 MG/1
30 TABLET, FILM COATED ORAL AT BEDTIME
Qty: 90 TABLET | Refills: 0 | Status: SHIPPED | OUTPATIENT
Start: 2019-12-26 | End: 2020-03-23

## 2020-01-27 ENCOUNTER — OFFICE VISIT (OUTPATIENT)
Dept: FAMILY MEDICINE | Facility: CLINIC | Age: 40
End: 2020-01-27
Payer: COMMERCIAL

## 2020-01-27 VITALS
DIASTOLIC BLOOD PRESSURE: 86 MMHG | TEMPERATURE: 97.3 F | HEIGHT: 75 IN | OXYGEN SATURATION: 99 % | SYSTOLIC BLOOD PRESSURE: 138 MMHG | BODY MASS INDEX: 24.62 KG/M2 | HEART RATE: 90 BPM | RESPIRATION RATE: 12 BRPM | WEIGHT: 198 LBS

## 2020-01-27 DIAGNOSIS — I10 BENIGN ESSENTIAL HYPERTENSION: ICD-10-CM

## 2020-01-27 DIAGNOSIS — F90.2 ATTENTION DEFICIT HYPERACTIVITY DISORDER (ADHD), COMBINED TYPE: Primary | ICD-10-CM

## 2020-01-27 PROCEDURE — 99214 OFFICE O/P EST MOD 30 MIN: CPT | Performed by: NURSE PRACTITIONER

## 2020-01-27 RX ORDER — LISINOPRIL 10 MG/1
10 TABLET ORAL DAILY
Qty: 30 TABLET | Refills: 11 | Status: SHIPPED | OUTPATIENT
Start: 2020-01-27 | End: 2020-07-28

## 2020-01-27 RX ORDER — DEXTROAMPHETAMINE SACCHARATE, AMPHETAMINE ASPARTATE, DEXTROAMPHETAMINE SULFATE AND AMPHETAMINE SULFATE 5; 5; 5; 5 MG/1; MG/1; MG/1; MG/1
20 TABLET ORAL DAILY
Qty: 30 TABLET | Refills: 0 | Status: SHIPPED | OUTPATIENT
Start: 2020-03-25 | End: 2020-07-28

## 2020-01-27 RX ORDER — DEXTROAMPHETAMINE SACCHARATE, AMPHETAMINE ASPARTATE, DEXTROAMPHETAMINE SULFATE AND AMPHETAMINE SULFATE 5; 5; 5; 5 MG/1; MG/1; MG/1; MG/1
20 TABLET ORAL DAILY
Qty: 30 TABLET | Refills: 0 | Status: SHIPPED | OUTPATIENT
Start: 2020-01-27 | End: 2020-07-22

## 2020-01-27 RX ORDER — DEXTROAMPHETAMINE SACCHARATE, AMPHETAMINE ASPARTATE, DEXTROAMPHETAMINE SULFATE AND AMPHETAMINE SULFATE 5; 5; 5; 5 MG/1; MG/1; MG/1; MG/1
20 TABLET ORAL DAILY
Qty: 30 TABLET | Refills: 0 | Status: SHIPPED | OUTPATIENT
Start: 2020-02-25 | End: 2020-07-28

## 2020-01-27 RX ORDER — DEXTROAMPHETAMINE SACCHARATE, AMPHETAMINE ASPARTATE, DEXTROAMPHETAMINE SULFATE AND AMPHETAMINE SULFATE 7.5; 7.5; 7.5; 7.5 MG/1; MG/1; MG/1; MG/1
30 TABLET ORAL DAILY
Qty: 30 TABLET | Refills: 0 | Status: SHIPPED | OUTPATIENT
Start: 2020-03-25 | End: 2020-07-22

## 2020-01-27 RX ORDER — DEXTROAMPHETAMINE SACCHARATE, AMPHETAMINE ASPARTATE, DEXTROAMPHETAMINE SULFATE AND AMPHETAMINE SULFATE 7.5; 7.5; 7.5; 7.5 MG/1; MG/1; MG/1; MG/1
30 TABLET ORAL DAILY
Qty: 30 TABLET | Refills: 0 | Status: SHIPPED | OUTPATIENT
Start: 2020-02-25 | End: 2020-07-28

## 2020-01-27 RX ORDER — DEXTROAMPHETAMINE SACCHARATE, AMPHETAMINE ASPARTATE, DEXTROAMPHETAMINE SULFATE AND AMPHETAMINE SULFATE 7.5; 7.5; 7.5; 7.5 MG/1; MG/1; MG/1; MG/1
30 TABLET ORAL DAILY
Qty: 30 TABLET | Refills: 0 | Status: SHIPPED | OUTPATIENT
Start: 2020-01-27 | End: 2020-07-28

## 2020-01-27 ASSESSMENT — MIFFLIN-ST. JEOR: SCORE: 1882.49

## 2020-01-27 NOTE — PROGRESS NOTES
Subjective     Timmy Yousif is a 39 year old male who presents to clinic today for the following health issues:    HPI   ADHD Follow-Up    Date of last ADHD office visit: 09/30/2019  Status since last visit: Stable  Taking controlled (daily) medications as prescribed: Yes                       Parent/Patient Concerns with Medications: None  ADHD Medication     Amphetamines Disp Start End     amphetamine-dextroamphetamine (ADDERALL) 20 MG tablet    30 tablet 1/27/2020     Sig - Route: Take 1 tablet (20 mg) by mouth daily In the afternoon - Oral    Class: E-Prescribe    Earliest Fill Date: 1/27/2020     amphetamine-dextroamphetamine (ADDERALL) 20 MG tablet    30 tablet 2/25/2020     Sig - Route: Take 1 tablet (20 mg) by mouth daily In the afternoon - Oral    Class: E-Prescribe    Earliest Fill Date: 2/25/2020     amphetamine-dextroamphetamine (ADDERALL) 20 MG tablet    30 tablet 3/25/2020     Sig - Route: Take 1 tablet (20 mg) by mouth daily In the afternoon - Oral    Class: E-Prescribe    Earliest Fill Date: 3/25/2020     amphetamine-dextroamphetamine (ADDERALL) 30 MG tablet    30 tablet 1/27/2020     Sig - Route: Take 1 tablet (30 mg) by mouth daily In morning - Oral    Class: E-Prescribe    Earliest Fill Date: 1/27/2020     amphetamine-dextroamphetamine (ADDERALL) 30 MG tablet    30 tablet 2/25/2020     Sig - Route: Take 1 tablet (30 mg) by mouth daily - Oral    Class: E-Prescribe    Earliest Fill Date: 2/25/2020     amphetamine-dextroamphetamine (ADDERALL) 30 MG tablet    30 tablet 3/25/2020     Sig - Route: Take 1 tablet (30 mg) by mouth daily - Oral    Class: E-Prescribe    Earliest Fill Date: 3/25/2020          Sleep: no problems  Home/Family Concerns: None  Peer Concerns: Stable    Medication Benefits:   Controlled symptoms: Attention span, Distractability and Finishing tasks  Uncontrolled Symptoms: None    Medication side effects:  Side effects noted: none  Denies: appetite suppression, weight loss,  "insomnia, tics, palpitations, stomach ache, headache, emotional lability, rebound irritability, drowsiness, \"zombie\" effect, growth suppression and dry mouth    Patient Active Problem List   Diagnosis     CARDIOVASCULAR SCREENING; LDL GOAL LESS THAN 160     Lumbar disc herniation with radiculopathy     ELISHA (obstructive sleep apnea)     Benign essential hypertension     Opioid dependence in remission (H)     Attention deficit hyperactivity disorder (ADHD), combined type     History reviewed. No pertinent surgical history.    Social History     Tobacco Use     Smoking status: Never Smoker     Smokeless tobacco: Never Used   Substance Use Topics     Alcohol use: Yes     Comment: occ     Family History   Problem Relation Age of Onset     Cancer Father         bladder cancer     Other Cancer Father         Bladder     Diabetes Mother         Type 2-managed by diet         Current Outpatient Medications   Medication Sig Dispense Refill     amphetamine-dextroamphetamine (ADDERALL) 20 MG tablet Take 1 tablet (20 mg) by mouth daily In the afternoon 30 tablet 0     [START ON 2/25/2020] amphetamine-dextroamphetamine (ADDERALL) 20 MG tablet Take 1 tablet (20 mg) by mouth daily In the afternoon 30 tablet 0     [START ON 3/25/2020] amphetamine-dextroamphetamine (ADDERALL) 20 MG tablet Take 1 tablet (20 mg) by mouth daily In the afternoon 30 tablet 0     amphetamine-dextroamphetamine (ADDERALL) 30 MG tablet Take 1 tablet (30 mg) by mouth daily In morning 30 tablet 0     [START ON 2/25/2020] amphetamine-dextroamphetamine (ADDERALL) 30 MG tablet Take 1 tablet (30 mg) by mouth daily 30 tablet 0     [START ON 3/25/2020] amphetamine-dextroamphetamine (ADDERALL) 30 MG tablet Take 1 tablet (30 mg) by mouth daily 30 tablet 0     lisinopril (PRINIVIL/ZESTRIL) 10 MG tablet Take 1 tablet (10 mg) by mouth daily 30 tablet 11     mirtazapine (REMERON) 30 MG tablet Take 1 tablet (30 mg) by mouth At Bedtime 90 tablet 0     Allergies   Allergen " "Reactions     Penicillin [Penicillins] Rash       Reviewed and updated as needed this visit by Provider  Tobacco  Allergies  Meds  Problems  Med Hx  Surg Hx  Fam Hx         Review of Systems   ROS COMP: Constitutional, HEENT, cardiovascular, pulmonary, gi and gu systems are negative, except as otherwise noted.      Objective    /86   Pulse 90   Temp 97.3  F (36.3  C) (Tympanic)   Resp 12   Ht 1.895 m (6' 2.61\")   Wt 89.8 kg (198 lb)   SpO2 99%   BMI 25.01 kg/m    Body mass index is 25.01 kg/m .  Physical Exam   GENERAL: healthy, alert and no distress  RESP: lungs clear to auscultation - no rales, rhonchi or wheezes  CV: regular rate and rhythm, normal S1 S2, no S3 or S4, no murmur  NEURO: Normal strength and tone, mentation intact and speech normal  PSYCH: mentation appears normal, affect normal/bright    Diagnostic Test Results:  Labs reviewed in Epic        Assessment & Plan     1. Attention deficit hyperactivity disorder (ADHD), combined type  Stable with current medication.   - amphetamine-dextroamphetamine (ADDERALL) 20 MG tablet; Take 1 tablet (20 mg) by mouth daily In the afternoon  Dispense: 30 tablet; Refill: 0  - amphetamine-dextroamphetamine (ADDERALL) 20 MG tablet; Take 1 tablet (20 mg) by mouth daily In the afternoon  Dispense: 30 tablet; Refill: 0  - amphetamine-dextroamphetamine (ADDERALL) 30 MG tablet; Take 1 tablet (30 mg) by mouth daily In morning  Dispense: 30 tablet; Refill: 0  - amphetamine-dextroamphetamine (ADDERALL) 30 MG tablet; Take 1 tablet (30 mg) by mouth daily  Dispense: 30 tablet; Refill: 0  - amphetamine-dextroamphetamine (ADDERALL) 20 MG tablet; Take 1 tablet (20 mg) by mouth daily In the afternoon  Dispense: 30 tablet; Refill: 0  - amphetamine-dextroamphetamine (ADDERALL) 30 MG tablet; Take 1 tablet (30 mg) by mouth daily  Dispense: 30 tablet; Refill: 0    2. Benign essential hypertension  Continued borderline BP, will restart the lisinopril. Recheck with labs in " 1 month.  - lisinopril (PRINIVIL/ZESTRIL) 10 MG tablet; Take 1 tablet (10 mg) by mouth daily  Dispense: 30 tablet; Refill: 11  - **Basic metabolic panel FUTURE anytime; Future     Home care instructions were reviewed with the patient. The risks, benefits and treatment options of prescribed medications or other treatments have been discussed with the patient. The patient verbalized their understanding and should call or follow up if no improvement or if they develop further problems.    Return in about 3 months (around 4/27/2020) for ADHD.    CARIE Mae [facility]

## 2020-02-24 ENCOUNTER — HEALTH MAINTENANCE LETTER (OUTPATIENT)
Age: 40
End: 2020-02-24

## 2020-03-23 ENCOUNTER — TELEPHONE (OUTPATIENT)
Dept: FAMILY MEDICINE | Facility: CLINIC | Age: 40
End: 2020-03-23

## 2020-03-23 DIAGNOSIS — G47.00 INSOMNIA: ICD-10-CM

## 2020-03-23 RX ORDER — MIRTAZAPINE 30 MG/1
TABLET, FILM COATED ORAL
Qty: 90 TABLET | Refills: 0 | Status: SHIPPED | OUTPATIENT
Start: 2020-03-23 | End: 2020-07-07

## 2020-03-23 NOTE — TELEPHONE ENCOUNTER
"Requested Prescriptions   Pending Prescriptions Disp Refills     mirtazapine (REMERON) 30 MG tablet [Pharmacy Med Name: MIRTAZAPINE 30MG TABS] 90 tablet 0     Sig: TAKE ONE TABLET BY MOUTH AT BEDTIME       Atypical Antidepressants Protocol Passed - 3/23/2020  9:36 AM        Passed - Recent (12 mo) or future (30 days) visit within the authorizing provider's specialty     Patient has had an office visit with the authorizing provider or a provider within the authorizing providers department within the previous 12 mos or has a future within next 30 days. See \"Patient Info\" tab in inbasket, or \"Choose Columns\" in Meds & Orders section of the refill encounter.              Passed - Medication active on med list        Passed - Patient is age 18 or older           Last Written Prescription Date:  12/26/19  Last Fill Quantity: 90,  # refills: 0   Last office visit: 1/27/2020 with prescribing provider:     Future Office Visit:      "

## 2020-03-23 NOTE — TELEPHONE ENCOUNTER
Reason for Call:  Other prescription    Detailed comments: Patient is asking if TOÑITO Krishna will approve his Adderall 20mg and 30mg for pickup today?  Pickup date is 03/25.  States he is going oncall for his work for a week and does not know if he will be able to  the 25th or after.  Pharmacy - [facility] [phone]    Phone Number Patient can be reached at: Home number on file [phone] (home)    Best Time: Any    Can we leave a detailed message on this number? YES    Call taken on 3/23/2020 at 9:41 AM by Nancy Daley

## 2020-04-22 ENCOUNTER — MYC MEDICAL ADVICE (OUTPATIENT)
Dept: FAMILY MEDICINE | Facility: CLINIC | Age: 40
End: 2020-04-22

## 2020-04-22 ENCOUNTER — VIRTUAL VISIT (OUTPATIENT)
Dept: FAMILY MEDICINE | Facility: CLINIC | Age: 40
End: 2020-04-22
Payer: COMMERCIAL

## 2020-04-22 ENCOUNTER — MYC REFILL (OUTPATIENT)
Dept: FAMILY MEDICINE | Facility: CLINIC | Age: 40
End: 2020-04-22

## 2020-04-22 DIAGNOSIS — F15.20 STIMULANT DEPENDENCE (H): ICD-10-CM

## 2020-04-22 DIAGNOSIS — F90.2 ATTENTION DEFICIT HYPERACTIVITY DISORDER (ADHD), COMBINED TYPE: Primary | ICD-10-CM

## 2020-04-22 DIAGNOSIS — F90.2 ATTENTION DEFICIT HYPERACTIVITY DISORDER (ADHD), COMBINED TYPE: ICD-10-CM

## 2020-04-22 PROCEDURE — 99213 OFFICE O/P EST LOW 20 MIN: CPT | Mod: 95 | Performed by: NURSE PRACTITIONER

## 2020-04-22 RX ORDER — DEXTROAMPHETAMINE SACCHARATE, AMPHETAMINE ASPARTATE, DEXTROAMPHETAMINE SULFATE AND AMPHETAMINE SULFATE 5; 5; 5; 5 MG/1; MG/1; MG/1; MG/1
20 TABLET ORAL DAILY
Qty: 30 TABLET | Refills: 0 | Status: SHIPPED | OUTPATIENT
Start: 2020-05-22 | End: 2020-07-28

## 2020-04-22 RX ORDER — DEXTROAMPHETAMINE SACCHARATE, AMPHETAMINE ASPARTATE, DEXTROAMPHETAMINE SULFATE AND AMPHETAMINE SULFATE 7.5; 7.5; 7.5; 7.5 MG/1; MG/1; MG/1; MG/1
30 TABLET ORAL DAILY
Qty: 30 TABLET | Refills: 0 | Status: SHIPPED | OUTPATIENT
Start: 2020-05-22 | End: 2020-06-18

## 2020-04-22 RX ORDER — DEXTROAMPHETAMINE SACCHARATE, AMPHETAMINE ASPARTATE, DEXTROAMPHETAMINE SULFATE AND AMPHETAMINE SULFATE 5; 5; 5; 5 MG/1; MG/1; MG/1; MG/1
20 TABLET ORAL DAILY
Qty: 30 TABLET | Refills: 0 | Status: SHIPPED | OUTPATIENT
Start: 2020-04-22 | End: 2020-06-18

## 2020-04-22 RX ORDER — DEXTROAMPHETAMINE SACCHARATE, AMPHETAMINE ASPARTATE MONOHYDRATE, DEXTROAMPHETAMINE SULFATE AND AMPHETAMINE SULFATE 7.5; 7.5; 7.5; 7.5 MG/1; MG/1; MG/1; MG/1
30 CAPSULE, EXTENDED RELEASE ORAL DAILY
Qty: 30 CAPSULE | Refills: 0 | Status: CANCELLED | OUTPATIENT
Start: 2020-04-25 | End: 2020-05-25

## 2020-04-22 RX ORDER — DEXTROAMPHETAMINE SACCHARATE, AMPHETAMINE ASPARTATE MONOHYDRATE, DEXTROAMPHETAMINE SULFATE AND AMPHETAMINE SULFATE 7.5; 7.5; 7.5; 7.5 MG/1; MG/1; MG/1; MG/1
30 CAPSULE, EXTENDED RELEASE ORAL DAILY
Qty: 30 CAPSULE | Refills: 0 | Status: CANCELLED | OUTPATIENT
Start: 2020-06-25 | End: 2020-07-25

## 2020-04-22 RX ORDER — DEXTROAMPHETAMINE SACCHARATE, AMPHETAMINE ASPARTATE, DEXTROAMPHETAMINE SULFATE AND AMPHETAMINE SULFATE 7.5; 7.5; 7.5; 7.5 MG/1; MG/1; MG/1; MG/1
30 TABLET ORAL DAILY
Qty: 30 TABLET | Refills: 0 | Status: SHIPPED | OUTPATIENT
Start: 2020-06-22 | End: 2020-06-18

## 2020-04-22 RX ORDER — DEXTROAMPHETAMINE SACCHARATE, AMPHETAMINE ASPARTATE, DEXTROAMPHETAMINE SULFATE AND AMPHETAMINE SULFATE 5; 5; 5; 5 MG/1; MG/1; MG/1; MG/1
20 TABLET ORAL DAILY
Qty: 30 TABLET | Refills: 0 | OUTPATIENT
Start: 2020-04-22

## 2020-04-22 RX ORDER — DEXTROAMPHETAMINE SACCHARATE, AMPHETAMINE ASPARTATE MONOHYDRATE, DEXTROAMPHETAMINE SULFATE AND AMPHETAMINE SULFATE 7.5; 7.5; 7.5; 7.5 MG/1; MG/1; MG/1; MG/1
30 CAPSULE, EXTENDED RELEASE ORAL DAILY
Qty: 30 CAPSULE | Refills: 0 | Status: CANCELLED | OUTPATIENT
Start: 2020-05-25 | End: 2020-06-24

## 2020-04-22 RX ORDER — DEXTROAMPHETAMINE SACCHARATE, AMPHETAMINE ASPARTATE, DEXTROAMPHETAMINE SULFATE AND AMPHETAMINE SULFATE 7.5; 7.5; 7.5; 7.5 MG/1; MG/1; MG/1; MG/1
30 TABLET ORAL DAILY
Qty: 30 TABLET | Refills: 0 | Status: SHIPPED | OUTPATIENT
Start: 2020-04-22 | End: 2020-07-28

## 2020-04-22 RX ORDER — DEXTROAMPHETAMINE SACCHARATE, AMPHETAMINE ASPARTATE, DEXTROAMPHETAMINE SULFATE AND AMPHETAMINE SULFATE 7.5; 7.5; 7.5; 7.5 MG/1; MG/1; MG/1; MG/1
30 TABLET ORAL DAILY
Qty: 30 TABLET | Refills: 0 | OUTPATIENT
Start: 2020-04-22

## 2020-04-22 RX ORDER — DEXTROAMPHETAMINE SACCHARATE, AMPHETAMINE ASPARTATE, DEXTROAMPHETAMINE SULFATE AND AMPHETAMINE SULFATE 5; 5; 5; 5 MG/1; MG/1; MG/1; MG/1
20 TABLET ORAL DAILY
Qty: 30 TABLET | Refills: 0 | Status: SHIPPED | OUTPATIENT
Start: 2020-06-22 | End: 2020-07-28

## 2020-04-22 NOTE — PROGRESS NOTES
"Timmy Yousif is a 39 year old male who is being evaluated via a billable telephone visit.      The patient has been notified of following:     \"This telephone visit will be conducted via a call between you and your physician/provider. We have found that certain health care needs can be provided without the need for a physical exam.  This service lets us provide the care you need with a short phone conversation.  If a prescription is necessary we can send it directly to your pharmacy.  If lab work is needed we can place an order for that and you can then stop by our lab to have the test done at a later time.    Telephone visits are billed at different rates depending on your insurance coverage. During this emergency period, for some insurers they may be billed the same as an in-person visit.  Please reach out to your insurance provider with any questions.    If during the course of the call the physician/provider feels a telephone visit is not appropriate, you will not be charged for this service.\"    Patient has given verbal consent for Telephone visit?  Yes    How would you like to obtain your AVS? Williamhart    Giorgio     Timmy Yousif is a 39 year old male who presents to clinic today for the following health issues:    HPI    ADHD Follow-Up    Date of last ADHD office visit: 1/27/2020  Status since last visit: Stable  Taking controlled (daily) medications as prescribed: Yes                       Parent/Patient Concerns with Medications: None  ADHD Medication     Amphetamines Disp Start End     amphetamine-dextroamphetamine (ADDERALL) 20 MG tablet    30 tablet 1/27/2020     Sig - Route: Take 1 tablet (20 mg) by mouth daily In the afternoon - Oral    Class: E-Prescribe    Earliest Fill Date: 1/27/2020     amphetamine-dextroamphetamine (ADDERALL) 20 MG tablet    30 tablet 2/25/2020     Sig - Route: Take 1 tablet (20 mg) by mouth daily In the afternoon - Oral    Class: E-Prescribe    Earliest Fill Date: " "2/25/2020     amphetamine-dextroamphetamine (ADDERALL) 20 MG tablet    30 tablet 3/25/2020     Sig - Route: Take 1 tablet (20 mg) by mouth daily In the afternoon - Oral    Class: E-Prescribe    Earliest Fill Date: 3/25/2020     amphetamine-dextroamphetamine (ADDERALL) 30 MG tablet    30 tablet 1/27/2020     Sig - Route: Take 1 tablet (30 mg) by mouth daily In morning - Oral    Class: E-Prescribe    Earliest Fill Date: 1/27/2020     amphetamine-dextroamphetamine (ADDERALL) 30 MG tablet    30 tablet 2/25/2020     Sig - Route: Take 1 tablet (30 mg) by mouth daily - Oral    Class: E-Prescribe    Earliest Fill Date: 2/25/2020     amphetamine-dextroamphetamine (ADDERALL) 30 MG tablet    30 tablet 3/25/2020     Sig - Route: Take 1 tablet (30 mg) by mouth daily - Oral    Class: E-Prescribe    Earliest Fill Date: 3/25/2020        Sleep: no problems      Medication Benefits:   Controlled symptoms: Attention span, Distractability and Finishing tasks  Uncontrolled Symptoms: None    Medication side effects:  Side effects noted: none  Denies: appetite suppression, weight loss, insomnia, tics, palpitations, stomach ache, headache, emotional lability, rebound irritability, drowsiness, \"zombie\" effect, growth suppression and dry mouth       Patient Active Problem List   Diagnosis     CARDIOVASCULAR SCREENING; LDL GOAL LESS THAN 160     Lumbar disc herniation with radiculopathy     ELISHA (obstructive sleep apnea)     Benign essential hypertension     Opioid dependence in remission (H)     Attention deficit hyperactivity disorder (ADHD), combined type     History reviewed. No pertinent surgical history.    Social History     Tobacco Use     Smoking status: Never Smoker     Smokeless tobacco: Never Used   Substance Use Topics     Alcohol use: Yes     Comment: occ     Family History   Problem Relation Age of Onset     Cancer Father         bladder cancer     Other Cancer Father         Bladder     Diabetes Mother         Type 2-managed by " diet         Current Outpatient Medications   Medication Sig Dispense Refill     amphetamine-dextroamphetamine (ADDERALL) 20 MG tablet Take 1 tablet (20 mg) by mouth daily -May  2 days prior to fill date 30 tablet 0     [START ON 5/22/2020] amphetamine-dextroamphetamine (ADDERALL) 20 MG tablet Take 1 tablet (20 mg) by mouth daily -May  2 days prior to fill date 30 tablet 0     [START ON 6/22/2020] amphetamine-dextroamphetamine (ADDERALL) 20 MG tablet Take 1 tablet (20 mg) by mouth daily -May  2 days prior to fill date 30 tablet 0     amphetamine-dextroamphetamine (ADDERALL) 20 MG tablet Take 1 tablet (20 mg) by mouth daily In the afternoon 30 tablet 0     amphetamine-dextroamphetamine (ADDERALL) 20 MG tablet Take 1 tablet (20 mg) by mouth daily In the afternoon 30 tablet 0     amphetamine-dextroamphetamine (ADDERALL) 20 MG tablet Take 1 tablet (20 mg) by mouth daily In the afternoon 30 tablet 0     amphetamine-dextroamphetamine (ADDERALL) 30 MG tablet Take 1 tablet (30 mg) by mouth daily -May  2 days prior to fill date 30 tablet 0     [START ON 5/22/2020] amphetamine-dextroamphetamine (ADDERALL) 30 MG tablet Take 1 tablet (30 mg) by mouth daily -May  2 days prior to fill date 30 tablet 0     [START ON 6/22/2020] amphetamine-dextroamphetamine (ADDERALL) 30 MG tablet Take 1 tablet (30 mg) by mouth daily -May  2 days prior to fill date 30 tablet 0     amphetamine-dextroamphetamine (ADDERALL) 30 MG tablet Take 1 tablet (30 mg) by mouth daily In morning 30 tablet 0     amphetamine-dextroamphetamine (ADDERALL) 30 MG tablet Take 1 tablet (30 mg) by mouth daily 30 tablet 0     amphetamine-dextroamphetamine (ADDERALL) 30 MG tablet Take 1 tablet (30 mg) by mouth daily 30 tablet 0     mirtazapine (REMERON) 30 MG tablet TAKE ONE TABLET BY MOUTH AT BEDTIME 90 tablet 0     lisinopril (PRINIVIL/ZESTRIL) 10 MG tablet Take 1 tablet (10 mg) by mouth daily (Patient not taking: Reported on  4/22/2020) 30 tablet 11     Allergies   Allergen Reactions     Penicillin [Penicillins] Rash       Reviewed and updated as needed this visit by Provider  Tobacco  Allergies  Meds  Problems  Med Hx  Surg Hx  Fam Hx         Review of Systems   ROS COMP: Constitutional, HEENT, cardiovascular, pulmonary, gi and gu systems are negative, except as otherwise noted.       Objective   Reported vitals:  There were no vitals taken for this visit.   PSYCH: Alert and oriented times 3; coherent speech, normal   rate and volume, able to articulate logical thoughts, able   to abstract reason, no tangential thoughts, no hallucinations   or delusions  His affect is normal  RESP: No cough, no audible wheezing, able to talk in full sentences  Remainder of exam unable to be completed due to telephone visits    Diagnostic Test Results:  none         Assessment/Plan:  1. Attention deficit hyperactivity disorder (ADHD), combined type  Stable with current medication  - amphetamine-dextroamphetamine (ADDERALL) 20 MG tablet; Take 1 tablet (20 mg) by mouth daily -May  2 days prior to fill date  Dispense: 30 tablet; Refill: 0  - amphetamine-dextroamphetamine (ADDERALL) 20 MG tablet; Take 1 tablet (20 mg) by mouth daily -May  2 days prior to fill date  Dispense: 30 tablet; Refill: 0  - amphetamine-dextroamphetamine (ADDERALL) 20 MG tablet; Take 1 tablet (20 mg) by mouth daily -May  2 days prior to fill date  Dispense: 30 tablet; Refill: 0  - amphetamine-dextroamphetamine (ADDERALL) 30 MG tablet; Take 1 tablet (30 mg) by mouth daily -May  2 days prior to fill date  Dispense: 30 tablet; Refill: 0  - amphetamine-dextroamphetamine (ADDERALL) 30 MG tablet; Take 1 tablet (30 mg) by mouth daily -May  2 days prior to fill date  Dispense: 30 tablet; Refill: 0  - amphetamine-dextroamphetamine (ADDERALL) 30 MG tablet; Take 1 tablet (30 mg) by mouth daily -May  2 days prior to fill date  Dispense: 30 tablet;  Refill: 0    2. Stimulant dependence (H)  stable  - amphetamine-dextroamphetamine (ADDERALL) 20 MG tablet; Take 1 tablet (20 mg) by mouth daily -May  2 days prior to fill date  Dispense: 30 tablet; Refill: 0  - amphetamine-dextroamphetamine (ADDERALL) 20 MG tablet; Take 1 tablet (20 mg) by mouth daily -May  2 days prior to fill date  Dispense: 30 tablet; Refill: 0  - amphetamine-dextroamphetamine (ADDERALL) 20 MG tablet; Take 1 tablet (20 mg) by mouth daily -May  2 days prior to fill date  Dispense: 30 tablet; Refill: 0  - amphetamine-dextroamphetamine (ADDERALL) 30 MG tablet; Take 1 tablet (30 mg) by mouth daily -May  2 days prior to fill date  Dispense: 30 tablet; Refill: 0  - amphetamine-dextroamphetamine (ADDERALL) 30 MG tablet; Take 1 tablet (30 mg) by mouth daily -May  2 days prior to fill date  Dispense: 30 tablet; Refill: 0  - amphetamine-dextroamphetamine (ADDERALL) 30 MG tablet; Take 1 tablet (30 mg) by mouth daily -May  2 days prior to fill date  Dispense: 30 tablet; Refill: 0    Home care instructions were reviewed with the patient. The risks, benefits and treatment options of prescribed medications or other treatments have been discussed with the patient. The patient verbalized their understanding and should call or follow up if no improvement or if they develop further problems.    Return in about 3 months (around 7/22/2020) for ADHD.    Phone call duration:  10 minutes    CARIE Mae CNP

## 2020-04-22 NOTE — TELEPHONE ENCOUNTER
Controlled Substance Refill Request for adderall  Problem List Complete:  Yes   checked in past 3 months?            Fill Date ID Written Drug Qty Days Prescriber Rx # Pharmacy Refill Daily Dose * Pymt Type    04/17/2020  1  04/16/2020  Buprenorphin-Naloxon 8-2 Mg Sl    60.00 30 [facility]  [id number]  All (1261)  0/[place] 16.[place]   03/23/2020  2  01/27/2020  Dextroamp-Amphetamin 30 Mg Tab    30.00 30 Ka Mar  [id number]  Andrew ([id number])  [place]   03/23/2020  2  01/27/2020  Dextroamp-Amphetamin 20 Mg Tab    30.00 30 Ka Mar  [id number]  Andrwe ([id number])  [place]   03/19/2020  1  03/19/2020  Buprenorphin-Naloxon 8-2 Mg Sl    60.00 30 Da [facility]  [id number]  All (1261)  0[place]   02/24/2020  2  01/27/2020  Dextroamp-Amphetamin 30 Mg Tab    30.00 30 Ka Mar  [id number]  Andrew ([id number])  [place]   02/24/2020  2  01/27/2020  Dextroamp-Amphetamin 20 Mg Tab    30.00 30 Ka Mar  [id number]  Andrew ([id number])  [place]   02/19/2020  1  02/19/2020  Buprenorphin-Naloxon 8-2 Mg Sl    60.00 30 Loretta Hin  [id number]  All (1261)  [place]   01/27/2020  2  01/27/2020  Dextroamp-Amphetamin 30 Mg Tab    30.00 30 Ka Mar  [id number]  Andrew (4362)  [place]   01/27/2020  2  01/27/2020  Dextroamp-Amphetamin 20 Mg Tab    30.00 30 Ka Mar  [id number]  Andrew ([id number])  [place]   01/17/2020  1  01/17/2020  Buprenorphin-Naloxon 8-2 Mg Sl    60.00 30 Da [facility]  [id number]  All (1261)  [place]   12/20/2019  2  12/20/2019  Dextroamp-Amphetamin 30 Mg Tab    30.00 30 Ka Mar  [id number]  Andrew ([id number])  [place]   12/20/2019  2  12/20/2019  Dextroamp-Amphetamin 20 Mg Tab    30.00 30 Ka Mar  [id number]  Andrew (4362[place]   12/20/2019  1  12/19/2019  Buprenorphin-Naloxon 8-2 Mg Sl    60.00 30 Da [facility]  [id number]  All (1261)  [place]   12/16/2019  1  12/16/2019  Buprenorphin-Naloxon 8-2 Mg Sl    10.00 5 Da [facility]  [id number]  All (1261)  0/[place]   11/25/2019  2  09/30/2019  Dextroamp-Amphetamin 30 Mg Tab    30.00 30 Ka Mar  [id number]  Andrew ([id number][place]   11/25/2019  2  09/30/2019  Dextroamp-Amphetamin 20 Mg Tab    30.00 30 Ka Mar  [id number]  [place] ([id number][place]   11/16/2019  1  11/15/2019  Buprenorphin-Naloxon 8-2 Mg Sl    60.00 30 Loretta Hin  [id number]  All (1261)  [place]   10/25/2019  2  09/30/2019  Dextroamp-Amphetamin 20 Mg Tab    30.00 30 Ka Mar  [id number]  Andrew ([id number])  [place]   10/25/2019  2  09/30/2019  Dextroamp-Amphetamin 30 Mg Tab    30.00 30 Ka Mar  [id number]  Andrew (4362[place]   10/17/2019  1  10/17/2019  Buprenorphin-Naloxon 8-2 Mg Sl    60.00 30 Da [facility]  [id number]  All (1261[place]   09/30/2019  1  09/13/2019  Buprenorphin-Naloxon 8-2 Mg Sl    33.00 16 Da [facility]  [id number]  All (1261)  [place] 16[place]   09/27/2019  2  09/27/2019  Dextroamp-Amphetamin 20 Mg Tab    30.00 30 Ka Mar  [id number]  [place] (4362[place]   09/27/2019  2  09/27/2019  Dextroamp-Amphetamin 30 Mg Tab    30.00 30 Ka Mar  [id number]  [place] (4362[place]   09/13/2019  1  09/13/2019  Buprenorphin-Naloxon 8-2 Mg Sl    27.00 14 Da [facility]  [id number]  All (1261)  [place] 15.[place]   08/23/2019  2  08/23/2019  Dextroamp-Amphetamin 30 Mg Tab    30.00 30 Ka Mar  [id number]  [place] (4362[place]   08/23/2019  2  08/23/2019  Dextroamp-Amphetamin 20 Mg Tab    30.00 30 Ka Mar  [id number]  [place] (4362[place]   08/09/2019  1  08/06/2019  Buprenorphin-Naloxon 8-2 Mg Sl    75.00 30 Da [facility]  [id number]  All (1261)  [place]   07/26/2019  2  05/30/2019  Dextroamp-Amphetamin 30 Mg Tab    30.00 30 Ka Mar  [id number]  [place] (4362[place]   07/26/2019  2  05/30/2019  Dextroamp-Amphetamin 20 Mg Tab    30.00 30 Ka Mar  [id number]  Andrew (4362[place]   07/08/2019  1  07/08/2019  Buprenorphin-Naloxon 8-2 Mg Sl    75.00 30 Da [facility]   [id number]  All (1261)  [place]   06/27/2019  3  05/30/2019  Dextroamp-Amphetamin 30 Mg Tab    30.00 30 Ka Mar  [id number]  Gra ([id number])  [place]   06/27/2019  3  05/30/2019  Dextroamp-Amphetamin 20 Mg Tab    30.00 30 Ka Mar  [id number]  Gra ([id number])  [place]   06/04/2019  1  06/04/2019  Buprenorphin-Naloxon 8-2 Mg Sl    75.00 30 Da [facility]  [id number]  All (1261[place]   05/30/2019  2  05/30/2019  Dextroamp-Amphetamin 30 Mg Tab    30.00 30 Ka Mar  [id number]  Andrew (4362)  [place]   05/30/2019  2  05/30/2019  Dextroamp-Amphetamin 20 Mg Tab    30.00 30 Ka Mar  [id number]  Andrew (4362)  [place]   05/18/2019  1  04/05/2019  Buprenorphin-Naloxon 8-2 Mg Sl    45.00 18 Da [facility]  [id number]  All (1261)  [place]   05/06/2019  1  04/05/2019  Buprenorphin-Naloxon 8-2 Mg Sl    30.00 12 Da [facility]  [id number]  All (1261)  0/[place]   04/29/2019  2  03/29/2019  Dextroamp-Amphetamin 20 Mg Tab    30.00 30 Ka Mar  [id number]  Andrew (4362[place]  [place]   04/29/2019  2  03/29/2019  Dextroamp-Amphetamin 30 Mg Tab    30.00 30 Ka Mar  [id number]  Andrew (4362)  [place]

## 2020-06-17 ENCOUNTER — TELEPHONE (OUTPATIENT)
Dept: FAMILY MEDICINE | Facility: CLINIC | Age: 40
End: 2020-06-17

## 2020-06-17 ENCOUNTER — VIRTUAL VISIT (OUTPATIENT)
Dept: FAMILY MEDICINE | Facility: CLINIC | Age: 40
End: 2020-06-17
Payer: COMMERCIAL

## 2020-06-17 DIAGNOSIS — M54.50 ACUTE BILATERAL LOW BACK PAIN WITHOUT SCIATICA: Primary | ICD-10-CM

## 2020-06-17 PROCEDURE — 99213 OFFICE O/P EST LOW 20 MIN: CPT | Mod: GT | Performed by: PHYSICIAN ASSISTANT

## 2020-06-17 RX ORDER — CYCLOBENZAPRINE HCL 10 MG
10 TABLET ORAL 3 TIMES DAILY PRN
Qty: 20 TABLET | Refills: 0 | Status: SHIPPED | OUTPATIENT
Start: 2020-06-17 | End: 2020-09-16

## 2020-06-17 RX ORDER — MELOXICAM 15 MG/1
15 TABLET ORAL DAILY
Qty: 30 TABLET | Refills: 0 | Status: SHIPPED | OUTPATIENT
Start: 2020-06-17 | End: 2020-07-28

## 2020-06-17 RX ORDER — LIDOCAINE 50 MG/G
OINTMENT TOPICAL 2 TIMES DAILY PRN
Qty: 30 G | Refills: 1 | Status: SHIPPED | OUTPATIENT
Start: 2020-06-17 | End: 2020-07-28

## 2020-06-17 NOTE — PROGRESS NOTES
"Timmy Yousif is a 39 year old male who is being evaluated via a billable video visit.      The patient has been notified of following:     \"This video visit will be conducted via a call between you and your physician/provider. We have found that certain health care needs can be provided without the need for an in-person physical exam.  This service lets us provide the care you need with a video conversation.  If a prescription is necessary we can send it directly to your pharmacy.  If lab work is needed we can place an order for that and you can then stop by our lab to have the test done at a later time.    Video visits are billed at different rates depending on your insurance coverage.  Please reach out to your insurance provider with any questions.    If during the course of the call the physician/provider feels a video visit is not appropriate, you will not be charged for this service.\"    Patient has given verbal consent for Video visit? Yes    Will anyone else be joining your video visit? No - phone # [phone] - text   -   email address [email or web address]  Subjective     Timmy Yousif is a 39 year old male who presents today via video visit for the following health issues:    HPI  Back Pain  Duration of complaint: x 10:30 am today   Specific cause: lifted 10 pounds  Description:   Location of pain: low back bilateral  Character of pain: sharp \"electric \"pain with spasms  Pain radiation:none  Intensity: severe  Accompanying Signs & Symptoms:  Fever: no   Numbness or weakness in legs:  no   Dysuria or Hematuria: no   Bowel or bladder incontinence: no   History:   Any injury (lifting, bending, twisting): no   Work Injury: no  History of back problems: 20 years since last back issues  Any previous MRI or X-rays: YES  Any history of back surgery: no   Any cancer history: no   Precipitating factors:   Worsened by: Lifting, Bending, Standing, Sitting and Coughing.  Alleviating factors:  Improved by: " unknown  Therapies Tried and outcome: Took some Ibuprofen today with some improvement.      Video Start Time: 2:37 PM    Patient Active Problem List   Diagnosis     CARDIOVASCULAR SCREENING; LDL GOAL LESS THAN 160     Lumbar disc herniation with radiculopathy     ELISHA (obstructive sleep apnea)     Benign essential hypertension     Opioid dependence in remission (H)     Attention deficit hyperactivity disorder (ADHD), combined type     No past surgical history on file.    Social History     Tobacco Use     Smoking status: Never Smoker     Smokeless tobacco: Never Used   Substance Use Topics     Alcohol use: Yes     Comment: occ     Family History   Problem Relation Age of Onset     Cancer Father         bladder cancer     Other Cancer Father         Bladder     Diabetes Mother         Type 2-managed by diet         Current Outpatient Medications   Medication Sig Dispense Refill     amphetamine-dextroamphetamine (ADDERALL) 20 MG tablet Take 1 tablet (20 mg) by mouth daily -May  2 days prior to fill date 30 tablet 0     [START ON 6/22/2020] amphetamine-dextroamphetamine (ADDERALL) 20 MG tablet Take 1 tablet (20 mg) by mouth daily -May  2 days prior to fill date 30 tablet 0     amphetamine-dextroamphetamine (ADDERALL) 20 MG tablet Take 1 tablet (20 mg) by mouth daily In the afternoon 30 tablet 0     amphetamine-dextroamphetamine (ADDERALL) 20 MG tablet Take 1 tablet (20 mg) by mouth daily In the afternoon 30 tablet 0     amphetamine-dextroamphetamine (ADDERALL) 20 MG tablet Take 1 tablet (20 mg) by mouth daily In the afternoon 30 tablet 0     amphetamine-dextroamphetamine (ADDERALL) 30 MG tablet Take 1 tablet (30 mg) by mouth daily -May  2 days prior to fill date 30 tablet 0     [START ON 6/22/2020] amphetamine-dextroamphetamine (ADDERALL) 30 MG tablet Take 1 tablet (30 mg) by mouth daily -May  2 days prior to fill date 30 tablet 0     amphetamine-dextroamphetamine (ADDERALL) 30 MG tablet Take  "1 tablet (30 mg) by mouth daily In morning 30 tablet 0     amphetamine-dextroamphetamine (ADDERALL) 30 MG tablet Take 1 tablet (30 mg) by mouth daily 30 tablet 0     amphetamine-dextroamphetamine (ADDERALL) 30 MG tablet Take 1 tablet (30 mg) by mouth daily 30 tablet 0     mirtazapine (REMERON) 30 MG tablet TAKE ONE TABLET BY MOUTH AT BEDTIME 90 tablet 0     lisinopril (PRINIVIL/ZESTRIL) 10 MG tablet Take 1 tablet (10 mg) by mouth daily (Patient not taking: Reported on 4/22/2020) 30 tablet 11     Allergies   Allergen Reactions     Penicillin [Penicillins] Rash       Reviewed and updated as needed this visit by Provider         Review of Systems   Constitutional, HEENT, cardiovascular, pulmonary, gi and gu systems are negative, except as otherwise noted.      Objective    There were no vitals taken for this visit.  Estimated body mass index is 25.01 kg/m  as calculated from the following:    Height as of 1/27/20: 1.895 m (6' 2.61\").    Weight as of 1/27/20: 89.8 kg (198 lb).  Physical Exam   GENERAL: Healthy, alert and no distress  EYES: Eyes grossly normal to inspection.  No discharge or erythema, or obvious scleral/conjunctival abnormalities.  RESP: No audible wheeze, cough, or visible cyanosis.  No visible retractions or increased work of breathing.    SKIN: Visible skin clear. No significant rash, abnormal pigmentation or lesions.  NEURO: Pt is walking around on video today without grimacing or antalgic gait. Cranial nerves grossly intact.  Mentation and speech appropriate for age.  PSYCH: Mentation appears normal, affect normal/bright, judgement and insight intact, normal speech and appearance well-groomed.    Diagnostic Test Results:  none       Assessment & Plan   (M54.5) Acute bilateral low back pain without sciatica  (primary encounter diagnosis)  Comment: Pt with acute flare of his intermittent chronic back pain. Given low DAVID, there is no indication for an XR. No evidence of bowel or bladder dysfunction " including urinary retention or incontinence to be concerned for cauda equina syndrome.   Mostly likely this is a low back spasm.   Recommend rest, applying ice or heat as needed, home stretching and exercise program.   Rx for Meloxicam, Flexeril and Lidocaine ointment. If not improved in the next 1-2 days, pt has had benefit from steroid burst in the past. Would add Prednisone 40 mg daily for 5 days if needed for symptoms.   RTC in 4 weeks if symptoms not improved or resolved.    Plan: cyclobenzaprine (FLEXERIL) 10 MG tablet,         meloxicam (MOBIC) 15 MG tablet, lidocaine         (XYLOCAINE) 5 % external ointment    Return in about 4 weeks (around 7/15/2020), or if symptoms worsen or fail to improve, for Video Visit.    Nikolay Perez PA-C  [facility]    Video-Visit Details    Type of service:  Video Visit    Video End Time:2:44 PM    Originating Location (pt. Location): Home    Distant Location (provider location):  [facility]     Platform used for Video Visit: Joe Perez PA-C

## 2020-06-17 NOTE — PATIENT INSTRUCTIONS
If you need Prednisone, let me know. Its not a first line treatment for low back pain anymore since most people do not respond to it. MyChart me if that is something you need after trying the medications I have prescribed first.

## 2020-06-18 ENCOUNTER — TELEPHONE (OUTPATIENT)
Dept: FAMILY MEDICINE | Facility: CLINIC | Age: 40
End: 2020-06-18

## 2020-06-18 DIAGNOSIS — M54.50 ACUTE BILATERAL LOW BACK PAIN WITHOUT SCIATICA: Primary | ICD-10-CM

## 2020-06-18 RX ORDER — PREDNISONE 20 MG/1
20 TABLET ORAL 2 TIMES DAILY
Qty: 10 TABLET | Refills: 0 | Status: SHIPPED | OUTPATIENT
Start: 2020-06-18 | End: 2020-07-28

## 2020-06-18 NOTE — TELEPHONE ENCOUNTER
Libia Krishna,    Spoke to the wife (consent on file) who says the patient had virtual visit yesterday with Nikolay Perez and Nikolay did not prescribe steroids for the patient, wife says Nikolay told patient they would not work for patient, but the patient thinks they worked in the past and due to long history of herniated disk thought they were helpful and the patient would like to know if you would consider prescribing them as the patient is not getting relief from ice, Meloxicam, or Lidocaine, but says Flexeril helps with sleep only. Patient is in a lot of pain, has been delaying surgery but would like to know if you would put a referral for ortho to now consider options.    CHRISTIE Coleman

## 2020-06-18 NOTE — TELEPHONE ENCOUNTER
Reason for call:  Patient reporting a symptom    Symptom or request: Pt's wife says Timmy has had back issues for years. He had surgery in the past with a neurosurgeon. He did virtual visit with Cecilio DOSS yesterday and she says he is worse today. She says he can hardly move and shaking with pain. She had to help him put his socks on. She says in the past Oral Steroids have helped but said Cecilio didn't want to do that because it probably wouldn't help.  He would like to see if Libia Tomi could look at the notes and prescribe it. She says he feels really down and feels like nobody can do anything for him. She wonders if Libia could make referral to Ortho or whomever would be the best for him to see. He has had steroid injections in his back in the past too.   Have you been treated for this before? Yes    Additional comments: [facility] in [place]    Phone Number patient can be reached at:   [phone] Wife-Sugar    Best Time:  anytime    Can we leave a detailed message on this number:  YES    Call taken on 6/18/2020 at 3:00 PM by Syeda Caba

## 2020-07-06 DIAGNOSIS — G47.00 INSOMNIA, UNSPECIFIED TYPE: ICD-10-CM

## 2020-07-07 RX ORDER — MIRTAZAPINE 30 MG/1
TABLET, FILM COATED ORAL
Qty: 30 TABLET | Refills: 0 | Status: SHIPPED | OUTPATIENT
Start: 2020-07-07 | End: 2020-08-10

## 2020-07-07 NOTE — TELEPHONE ENCOUNTER
"Routing refill request to provider for review/approval because:  Prescribed for insomnia; this is not on Problem list    Requested Prescriptions   Pending Prescriptions Disp Refills     mirtazapine (REMERON) 30 MG tablet [Pharmacy Med Name: MIRTAZAPINE 30MG TABS] 90 tablet 0     Sig: TAKE ONE TABLET BY MOUTH ONCE DAILY AT BEDTIME       Atypical Antidepressants Protocol Passed - 7/6/2020 10:14 AM        Passed - Recent (12 mo) or future (30 days) visit within the authorizing provider's specialty     Patient has had an office visit with the authorizing provider or a provider within the authorizing providers department within the previous 12 mos or has a future within next 30 days. See \"Patient Info\" tab in inbasket, or \"Choose Columns\" in Meds & Orders section of the refill encounter.              Passed - Medication active on med list        Passed - Patient is age 18 or older           Blank PERSAUD RN, BSN      "

## 2020-07-17 ENCOUNTER — TRANSFERRED RECORDS (OUTPATIENT)
Dept: HEALTH INFORMATION MANAGEMENT | Facility: CLINIC | Age: 40
End: 2020-07-17

## 2020-07-22 ENCOUNTER — MYC MEDICAL ADVICE (OUTPATIENT)
Dept: FAMILY MEDICINE | Facility: CLINIC | Age: 40
End: 2020-07-22
Payer: COMMERCIAL

## 2020-07-22 DIAGNOSIS — F90.2 ATTENTION DEFICIT HYPERACTIVITY DISORDER (ADHD), COMBINED TYPE: ICD-10-CM

## 2020-07-22 DIAGNOSIS — F15.20 STIMULANT DEPENDENCE (H): ICD-10-CM

## 2020-07-22 RX ORDER — DEXTROAMPHETAMINE SACCHARATE, AMPHETAMINE ASPARTATE, DEXTROAMPHETAMINE SULFATE AND AMPHETAMINE SULFATE 7.5; 7.5; 7.5; 7.5 MG/1; MG/1; MG/1; MG/1
30 TABLET ORAL DAILY
Qty: 30 TABLET | Refills: 0 | Status: SHIPPED | OUTPATIENT
Start: 2020-07-22 | End: 2020-08-21

## 2020-07-22 RX ORDER — DEXTROAMPHETAMINE SACCHARATE, AMPHETAMINE ASPARTATE, DEXTROAMPHETAMINE SULFATE AND AMPHETAMINE SULFATE 5; 5; 5; 5 MG/1; MG/1; MG/1; MG/1
20 TABLET ORAL DAILY
Qty: 30 TABLET | Refills: 0 | Status: CANCELLED | OUTPATIENT
Start: 2020-07-22

## 2020-07-22 RX ORDER — DEXTROAMPHETAMINE SACCHARATE, AMPHETAMINE ASPARTATE, DEXTROAMPHETAMINE SULFATE AND AMPHETAMINE SULFATE 5; 5; 5; 5 MG/1; MG/1; MG/1; MG/1
20 TABLET ORAL DAILY
Qty: 30 TABLET | Refills: 0 | Status: SHIPPED | OUTPATIENT
Start: 2020-07-22 | End: 2020-08-21

## 2020-07-28 ENCOUNTER — VIRTUAL VISIT (OUTPATIENT)
Dept: FAMILY MEDICINE | Facility: CLINIC | Age: 40
End: 2020-07-28
Payer: COMMERCIAL

## 2020-07-28 DIAGNOSIS — Z53.9 ERRONEOUS ENCOUNTER--DISREGARD: Primary | ICD-10-CM

## 2020-07-28 RX ORDER — DEXTROAMPHETAMINE SACCHARATE, AMPHETAMINE ASPARTATE, DEXTROAMPHETAMINE SULFATE AND AMPHETAMINE SULFATE 7.5; 7.5; 7.5; 7.5 MG/1; MG/1; MG/1; MG/1
30 TABLET ORAL DAILY
Qty: 30 TABLET | Refills: 0 | Status: CANCELLED | OUTPATIENT
Start: 2020-09-20 | End: 2020-10-20

## 2020-07-28 RX ORDER — MIRTAZAPINE 30 MG/1
TABLET, FILM COATED ORAL
Qty: 30 TABLET | Status: CANCELLED | OUTPATIENT
Start: 2020-07-28

## 2020-07-28 RX ORDER — DEXTROAMPHETAMINE SACCHARATE, AMPHETAMINE ASPARTATE, DEXTROAMPHETAMINE SULFATE AND AMPHETAMINE SULFATE 5; 5; 5; 5 MG/1; MG/1; MG/1; MG/1
20 TABLET ORAL DAILY
Qty: 30 TABLET | Refills: 0 | Status: CANCELLED | OUTPATIENT
Start: 2020-10-22 | End: 2020-11-21

## 2020-07-28 RX ORDER — DEXTROAMPHETAMINE SACCHARATE, AMPHETAMINE ASPARTATE, DEXTROAMPHETAMINE SULFATE AND AMPHETAMINE SULFATE 7.5; 7.5; 7.5; 7.5 MG/1; MG/1; MG/1; MG/1
30 TABLET ORAL DAILY
Qty: 30 TABLET | Refills: 0 | Status: CANCELLED | OUTPATIENT
Start: 2020-10-22 | End: 2020-11-21

## 2020-07-28 RX ORDER — DEXTROAMPHETAMINE SACCHARATE, AMPHETAMINE ASPARTATE, DEXTROAMPHETAMINE SULFATE AND AMPHETAMINE SULFATE 5; 5; 5; 5 MG/1; MG/1; MG/1; MG/1
20 TABLET ORAL DAILY
Qty: 30 TABLET | Refills: 0 | Status: CANCELLED | OUTPATIENT
Start: 2020-08-21 | End: 2020-09-20

## 2020-07-28 RX ORDER — DEXTROAMPHETAMINE SACCHARATE, AMPHETAMINE ASPARTATE, DEXTROAMPHETAMINE SULFATE AND AMPHETAMINE SULFATE 7.5; 7.5; 7.5; 7.5 MG/1; MG/1; MG/1; MG/1
30 TABLET ORAL DAILY
Qty: 30 TABLET | Refills: 0 | Status: CANCELLED | OUTPATIENT
Start: 2020-08-21 | End: 2020-09-20

## 2020-07-28 RX ORDER — DEXTROAMPHETAMINE SACCHARATE, AMPHETAMINE ASPARTATE, DEXTROAMPHETAMINE SULFATE AND AMPHETAMINE SULFATE 5; 5; 5; 5 MG/1; MG/1; MG/1; MG/1
20 TABLET ORAL DAILY
Qty: 30 TABLET | Refills: 0 | Status: CANCELLED | OUTPATIENT
Start: 2020-09-20 | End: 2020-10-20

## 2020-08-10 ENCOUNTER — MYC REFILL (OUTPATIENT)
Dept: FAMILY MEDICINE | Facility: CLINIC | Age: 40
End: 2020-08-10

## 2020-08-10 DIAGNOSIS — G47.00 INSOMNIA, UNSPECIFIED TYPE: ICD-10-CM

## 2020-08-10 RX ORDER — MIRTAZAPINE 30 MG/1
30 TABLET, FILM COATED ORAL AT BEDTIME
Qty: 30 TABLET | Refills: 0 | Status: SHIPPED | OUTPATIENT
Start: 2020-08-10 | End: 2020-09-16

## 2020-08-10 RX ORDER — MIRTAZAPINE 30 MG/1
TABLET, FILM COATED ORAL
Qty: 90 TABLET | Refills: 1 | Status: SHIPPED | OUTPATIENT
Start: 2020-08-10 | End: 2021-02-12

## 2020-08-21 ENCOUNTER — MYC MEDICAL ADVICE (OUTPATIENT)
Dept: FAMILY MEDICINE | Facility: CLINIC | Age: 40
End: 2020-08-21
Payer: COMMERCIAL

## 2020-08-21 DIAGNOSIS — F90.2 ATTENTION DEFICIT HYPERACTIVITY DISORDER (ADHD), COMBINED TYPE: ICD-10-CM

## 2020-08-21 RX ORDER — DEXTROAMPHETAMINE SACCHARATE, AMPHETAMINE ASPARTATE, DEXTROAMPHETAMINE SULFATE AND AMPHETAMINE SULFATE 5; 5; 5; 5 MG/1; MG/1; MG/1; MG/1
TABLET ORAL
Qty: 30 TABLET | Refills: 0 | Status: SHIPPED | OUTPATIENT
Start: 2020-08-21 | End: 2020-12-15

## 2020-08-21 RX ORDER — DEXTROAMPHETAMINE SACCHARATE, AMPHETAMINE ASPARTATE, DEXTROAMPHETAMINE SULFATE AND AMPHETAMINE SULFATE 7.5; 7.5; 7.5; 7.5 MG/1; MG/1; MG/1; MG/1
TABLET ORAL
Qty: 30 TABLET | Refills: 0 | Status: SHIPPED | OUTPATIENT
Start: 2020-08-21 | End: 2020-12-15

## 2020-08-21 NOTE — TELEPHONE ENCOUNTER
Routing refill request to provider for review/approval because:  Drug not on the [facility] refill protocol   Pt also sent OYE! message requesting refills    Blank PERSAUD RN, BSN

## 2020-09-04 ENCOUNTER — TRANSFERRED RECORDS (OUTPATIENT)
Dept: HEALTH INFORMATION MANAGEMENT | Facility: CLINIC | Age: 40
End: 2020-09-04

## 2020-09-16 ENCOUNTER — VIRTUAL VISIT (OUTPATIENT)
Dept: FAMILY MEDICINE | Facility: CLINIC | Age: 40
End: 2020-09-16
Payer: COMMERCIAL

## 2020-09-16 DIAGNOSIS — F90.2 ATTENTION DEFICIT HYPERACTIVITY DISORDER (ADHD), COMBINED TYPE: Primary | ICD-10-CM

## 2020-09-16 DIAGNOSIS — F15.20 STIMULANT DEPENDENCE (H): ICD-10-CM

## 2020-09-16 PROCEDURE — 99213 OFFICE O/P EST LOW 20 MIN: CPT | Mod: 95 | Performed by: NURSE PRACTITIONER

## 2020-09-16 RX ORDER — DEXTROAMPHETAMINE SACCHARATE, AMPHETAMINE ASPARTATE, DEXTROAMPHETAMINE SULFATE AND AMPHETAMINE SULFATE 7.5; 7.5; 7.5; 7.5 MG/1; MG/1; MG/1; MG/1
30 TABLET ORAL DAILY
Qty: 30 TABLET | Refills: 0 | Status: SHIPPED | OUTPATIENT
Start: 2020-11-20 | End: 2020-12-20

## 2020-09-16 RX ORDER — DEXTROAMPHETAMINE SACCHARATE, AMPHETAMINE ASPARTATE, DEXTROAMPHETAMINE SULFATE AND AMPHETAMINE SULFATE 5; 5; 5; 5 MG/1; MG/1; MG/1; MG/1
20 TABLET ORAL DAILY
Qty: 30 TABLET | Refills: 0 | Status: SHIPPED | OUTPATIENT
Start: 2020-10-20 | End: 2020-11-19

## 2020-09-16 RX ORDER — DEXTROAMPHETAMINE SACCHARATE, AMPHETAMINE ASPARTATE, DEXTROAMPHETAMINE SULFATE AND AMPHETAMINE SULFATE 7.5; 7.5; 7.5; 7.5 MG/1; MG/1; MG/1; MG/1
30 TABLET ORAL DAILY
Qty: 30 TABLET | Refills: 0 | Status: SHIPPED | OUTPATIENT
Start: 2020-10-20 | End: 2020-11-19

## 2020-09-16 RX ORDER — DEXTROAMPHETAMINE SACCHARATE, AMPHETAMINE ASPARTATE, DEXTROAMPHETAMINE SULFATE AND AMPHETAMINE SULFATE 5; 5; 5; 5 MG/1; MG/1; MG/1; MG/1
20 TABLET ORAL DAILY
Qty: 30 TABLET | Refills: 0 | Status: SHIPPED | OUTPATIENT
Start: 2020-11-20 | End: 2020-12-20

## 2020-09-16 RX ORDER — DEXTROAMPHETAMINE SACCHARATE, AMPHETAMINE ASPARTATE, DEXTROAMPHETAMINE SULFATE AND AMPHETAMINE SULFATE 5; 5; 5; 5 MG/1; MG/1; MG/1; MG/1
20 TABLET ORAL DAILY
Qty: 30 TABLET | Refills: 0 | Status: SHIPPED | OUTPATIENT
Start: 2020-09-19 | End: 2020-10-19

## 2020-09-16 RX ORDER — DEXTROAMPHETAMINE SACCHARATE, AMPHETAMINE ASPARTATE, DEXTROAMPHETAMINE SULFATE AND AMPHETAMINE SULFATE 7.5; 7.5; 7.5; 7.5 MG/1; MG/1; MG/1; MG/1
30 TABLET ORAL DAILY
Qty: 30 TABLET | Refills: 0 | Status: SHIPPED | OUTPATIENT
Start: 2020-09-19 | End: 2020-10-19

## 2020-09-16 NOTE — PROGRESS NOTES
"Timmy Yousif is a 39 year old male who is being evaluated via a billable video visit.      The patient has been notified of following:     \"This video visit will be conducted via a call between you and your physician/provider. We have found that certain health care needs can be provided without the need for an in-person physical exam.  This service lets us provide the care you need with a video conversation.  If a prescription is necessary we can send it directly to your pharmacy.  If lab work is needed we can place an order for that and you can then stop by our lab to have the test done at a later time.    Video visits are billed at different rates depending on your insurance coverage.  Please reach out to your insurance provider with any questions.    If during the course of the call the physician/provider feels a video visit is not appropriate, you will not be charged for this service.\"    Patient has given verbal consent for Video visit? Yes  How would you like to obtain your AVS? MyChart  If you are dropped from the video visit, the video invite should be resent to: Text to cell phone: [phone]  Will anyone else be joining your video visit? No    Subjective     Timmy Yousif is a 39 year old male who presents today via video visit for the following health issues:    HPI      ADHD Follow-Up    Date of last ADHD office visit: 4/22/2020  Status since last visit: Stable  Taking controlled (daily) medications as prescribed: Yes                       Parent/Patient Concerns with Medications: None  ADHD Medication     Amphetamines Disp Start End     amphetamine-dextroamphetamine (ADDERALL) 20 MG tablet    30 tablet 8/21/2020     Sig: TAKE ONE TABLET BY MOUTH ONCE DAILY IN THE  AFTERNOON    Class: E-Prescribe    Earliest Fill Date: 8/21/2020     amphetamine-dextroamphetamine (ADDERALL) 30 MG tablet    30 tablet 8/21/2020     Sig: TAKE ONE TABLET BY MOUTH ONCE DAILY    Class: E-Prescribe    Earliest Fill Date: " "8/21/2020        Sleep: no problems    Medication Benefits:   Controlled symptoms: Attention span, Distractability and Finishing tasks  Uncontrolled Symptoms: None    Medication side effects:  Side effects noted: none  Denies: appetite suppression, weight loss, insomnia, tics, palpitations, stomach ache, headache, emotional lability, rebound irritability, drowsiness, \"zombie\" effect, growth suppression and dry mouth     Video Start Time: 10:12      Review of Systems   Constitutional, HEENT, cardiovascular, pulmonary, gi and gu systems are negative, except as otherwise noted.      Objective           Vitals:  No vitals were obtained today due to virtual visit.    Physical Exam     GENERAL: Healthy, alert and no distress  EYES: Eyes grossly normal to inspection.  No discharge or erythema, or obvious scleral/conjunctival abnormalities.  RESP: No audible wheeze, cough, or visible cyanosis.  No visible retractions or increased work of breathing.    SKIN: Visible skin clear. No significant rash, abnormal pigmentation or lesions.  NEURO: Cranial nerves grossly intact.  Mentation and speech appropriate for age.  PSYCH: Mentation appears normal, affect normal/bright, judgement and insight intact, normal speech and appearance well-groomed.          Assessment & Plan     Attention deficit hyperactivity disorder (ADHD), combined type  Stable with current medication.  Recheck in 3 months.   - amphetamine-dextroamphetamine (ADDERALL) 20 MG tablet; Take 1 tablet (20 mg) by mouth daily In afternoon-Ok to fill up to two days early  - amphetamine-dextroamphetamine (ADDERALL) 20 MG tablet; Take 1 tablet (20 mg) by mouth daily In afternoon-Ok to fill up to two days early  - amphetamine-dextroamphetamine (ADDERALL) 20 MG tablet; Take 1 tablet (20 mg) by mouth daily In afternoon-Ok to fill up to two days early  - amphetamine-dextroamphetamine (ADDERALL) 30 MG tablet; Take 1 tablet (30 mg) by mouth daily Ok to fill up to two days early  - " amphetamine-dextroamphetamine (ADDERALL) 30 MG tablet; Take 1 tablet (30 mg) by mouth daily Ok to fill up to two days early  - amphetamine-dextroamphetamine (ADDERALL) 30 MG tablet; Take 1 tablet (30 mg) by mouth daily Ok to fill up to two days early    Stimulant dependence (H)  Stable with current medication.   - amphetamine-dextroamphetamine (ADDERALL) 20 MG tablet; Take 1 tablet (20 mg) by mouth daily In afternoon-Ok to fill up to two days early  - amphetamine-dextroamphetamine (ADDERALL) 20 MG tablet; Take 1 tablet (20 mg) by mouth daily In afternoon-Ok to fill up to two days early  - amphetamine-dextroamphetamine (ADDERALL) 20 MG tablet; Take 1 tablet (20 mg) by mouth daily In afternoon-Ok to fill up to two days early  - amphetamine-dextroamphetamine (ADDERALL) 30 MG tablet; Take 1 tablet (30 mg) by mouth daily Ok to fill up to two days early  - amphetamine-dextroamphetamine (ADDERALL) 30 MG tablet; Take 1 tablet (30 mg) by mouth daily Ok to fill up to two days early  - amphetamine-dextroamphetamine (ADDERALL) 30 MG tablet; Take 1 tablet (30 mg) by mouth daily Ok to fill up to two days early     Home care instructions were reviewed with the patient. The risks, benefits and treatment options of prescribed medications or other treatments have been discussed with the patient. The patient verbalized their understanding and should call or follow up if no improvement or if they develop further problems.    Return in about 3 months (around 12/16/2020) for ADHD.    CARIE Mae [facility]    Due to technical difficulty visit done via video and phone      Video-Visit Details    Type of service:  Video Visit    Video End Time:10:22 AM    Originating Location (pt. Location): Home    Distant Location (provider location):  [facility]     Platform used for Video Visit: Joe

## 2020-12-13 ENCOUNTER — HEALTH MAINTENANCE LETTER (OUTPATIENT)
Age: 40
End: 2020-12-13

## 2020-12-15 ENCOUNTER — VIRTUAL VISIT (OUTPATIENT)
Dept: FAMILY MEDICINE | Facility: CLINIC | Age: 40
End: 2020-12-15
Payer: COMMERCIAL

## 2020-12-15 DIAGNOSIS — F90.2 ATTENTION DEFICIT HYPERACTIVITY DISORDER (ADHD), COMBINED TYPE: Primary | ICD-10-CM

## 2020-12-15 DIAGNOSIS — F15.20 STIMULANT DEPENDENCE (H): ICD-10-CM

## 2020-12-15 PROCEDURE — 99213 OFFICE O/P EST LOW 20 MIN: CPT | Mod: 95 | Performed by: NURSE PRACTITIONER

## 2020-12-15 RX ORDER — DEXTROAMPHETAMINE SACCHARATE, AMPHETAMINE ASPARTATE, DEXTROAMPHETAMINE SULFATE AND AMPHETAMINE SULFATE 5; 5; 5; 5 MG/1; MG/1; MG/1; MG/1
20 TABLET ORAL DAILY
Qty: 30 TABLET | Refills: 0 | Status: SHIPPED | OUTPATIENT
Start: 2020-12-15 | End: 2021-01-14

## 2020-12-15 RX ORDER — DEXTROAMPHETAMINE SACCHARATE, AMPHETAMINE ASPARTATE, DEXTROAMPHETAMINE SULFATE AND AMPHETAMINE SULFATE 7.5; 7.5; 7.5; 7.5 MG/1; MG/1; MG/1; MG/1
30 TABLET ORAL DAILY
Qty: 30 TABLET | Refills: 0 | Status: SHIPPED | OUTPATIENT
Start: 2021-02-15 | End: 2021-03-09

## 2020-12-15 RX ORDER — DEXTROAMPHETAMINE SACCHARATE, AMPHETAMINE ASPARTATE, DEXTROAMPHETAMINE SULFATE AND AMPHETAMINE SULFATE 5; 5; 5; 5 MG/1; MG/1; MG/1; MG/1
20 TABLET ORAL DAILY
Qty: 30 TABLET | Refills: 0 | Status: SHIPPED | OUTPATIENT
Start: 2021-02-15 | End: 2021-03-09

## 2020-12-15 RX ORDER — DEXTROAMPHETAMINE SACCHARATE, AMPHETAMINE ASPARTATE, DEXTROAMPHETAMINE SULFATE AND AMPHETAMINE SULFATE 7.5; 7.5; 7.5; 7.5 MG/1; MG/1; MG/1; MG/1
30 TABLET ORAL DAILY
Qty: 30 TABLET | Refills: 0 | Status: SHIPPED | OUTPATIENT
Start: 2020-12-15 | End: 2021-01-14

## 2020-12-15 RX ORDER — DEXTROAMPHETAMINE SACCHARATE, AMPHETAMINE ASPARTATE, DEXTROAMPHETAMINE SULFATE AND AMPHETAMINE SULFATE 7.5; 7.5; 7.5; 7.5 MG/1; MG/1; MG/1; MG/1
30 TABLET ORAL DAILY
Qty: 30 TABLET | Refills: 0 | Status: SHIPPED | OUTPATIENT
Start: 2021-01-15 | End: 2021-02-14

## 2020-12-15 RX ORDER — DEXTROAMPHETAMINE SACCHARATE, AMPHETAMINE ASPARTATE, DEXTROAMPHETAMINE SULFATE AND AMPHETAMINE SULFATE 5; 5; 5; 5 MG/1; MG/1; MG/1; MG/1
20 TABLET ORAL DAILY
Qty: 30 TABLET | Refills: 0 | Status: SHIPPED | OUTPATIENT
Start: 2021-01-15 | End: 2021-02-14

## 2020-12-15 NOTE — PROGRESS NOTES
"Timmy Yousif is a 40 year old male who is being evaluated via a billable video visit.      The patient has been notified of following:     \"This video visit will be conducted via a call between you and your physician/provider. We have found that certain health care needs can be provided without the need for an in-person physical exam.  This service lets us provide the care you need with a video conversation.  If a prescription is necessary we can send it directly to your pharmacy.  If lab work is needed we can place an order for that and you can then stop by our lab to have the test done at a later time.    Video visits are billed at different rates depending on your insurance coverage.  Please reach out to your insurance provider with any questions.    If during the course of the call the physician/provider feels a video visit is not appropriate, you will not be charged for this service.\"    Patient has given verbal consent for Video visit? Yes  How would you like to obtain your AVS? MyChart  If you are dropped from the video visit, the video invite should be resent to: Text to cell phone: [phone]  Will anyone else be joining your video visit? No    Subjective     Timmy Yousif is a 40 year old male who presents today via video visit for the following health issues:    HPI       ADHD Follow-Up    Date of last ADHD office visit: 9/16/2020  Status since last visit: Stable  Taking controlled (daily) medications as prescribed: Yes                       Parent/Patient Concerns with Medications: None  ADHD Medication     Amphetamines Disp Start End     amphetamine-dextroamphetamine (ADDERALL) 20 MG tablet    30 tablet 11/20/2020 12/20/2020    Sig - Route: Take 1 tablet (20 mg) by mouth daily In afternoon-Ok to fill up to two days early - Oral    Class: E-Prescribe    Earliest Fill Date: 11/17/2020     amphetamine-dextroamphetamine (ADDERALL) 30 MG tablet    30 tablet 11/20/2020 12/20/2020    Sig - Route: Take 1 " "tablet (30 mg) by mouth daily Ok to fill up to two days early - Oral    Class: E-Prescribe    Earliest Fill Date: 11/17/2020          Medication Benefits:   Controlled symptoms: Attention span, Distractability and Finishing tasks  Uncontrolled Symptoms: None    Medication side effects:  Side effects noted: none  Denies: appetite suppression, weight loss, insomnia, tics, palpitations, stomach ache, headache, emotional lability, rebound irritability, drowsiness, \"zombie\" effect, growth suppression and dry mouth     Video Start Time: 09:25      Review of Systems   Constitutional, HEENT, cardiovascular, pulmonary, gi and gu systems are negative, except as otherwise noted.      Objective           Vitals:  No vitals were obtained today due to virtual visit.    Physical Exam     GENERAL: Healthy, alert and no distress  EYES: Eyes grossly normal to inspection.  No discharge or erythema, or obvious scleral/conjunctival abnormalities.  RESP: No audible wheeze, cough, or visible cyanosis.  No visible retractions or increased work of breathing.    SKIN: Visible skin clear. No significant rash, abnormal pigmentation or lesions.  NEURO: Cranial nerves grossly intact.  Mentation and speech appropriate for age.  PSYCH: Mentation appears normal, affect normal/bright, judgement and insight intact, normal speech and appearance well-groomed.          Assessment & Plan     Attention deficit hyperactivity disorder (ADHD), combined type  Stable with current medication.  Recheck in 3 months.   - amphetamine-dextroamphetamine (ADDERALL) 20 MG tablet; Take 1 tablet (20 mg) by mouth daily In afternoon  - amphetamine-dextroamphetamine (ADDERALL) 20 MG tablet; Take 1 tablet (20 mg) by mouth daily In afternoon  -Ok to fill up to 2 days early  - amphetamine-dextroamphetamine (ADDERALL) 20 MG tablet; Take 1 tablet (20 mg) by mouth daily In afternoon--Ok to fill up to 2 days early  - amphetamine-dextroamphetamine (ADDERALL) 30 MG tablet; Take 1 " tablet (30 mg) by mouth daily  - amphetamine-dextroamphetamine (ADDERALL) 30 MG tablet; Take 1 tablet (30 mg) by mouth daily -Ok to fill up to 2 days early  - amphetamine-dextroamphetamine (ADDERALL) 30 MG tablet; Take 1 tablet (30 mg) by mouth daily -Ok to fill up to 2 days early    Stimulant dependence (H)  Stable.   - amphetamine-dextroamphetamine (ADDERALL) 20 MG tablet; Take 1 tablet (20 mg) by mouth daily In afternoon  - amphetamine-dextroamphetamine (ADDERALL) 20 MG tablet; Take 1 tablet (20 mg) by mouth daily In afternoon  -Ok to fill up to 2 days early  - amphetamine-dextroamphetamine (ADDERALL) 20 MG tablet; Take 1 tablet (20 mg) by mouth daily In afternoon--Ok to fill up to 2 days early  - amphetamine-dextroamphetamine (ADDERALL) 30 MG tablet; Take 1 tablet (30 mg) by mouth daily  - amphetamine-dextroamphetamine (ADDERALL) 30 MG tablet; Take 1 tablet (30 mg) by mouth daily -Ok to fill up to 2 days early  - amphetamine-dextroamphetamine (ADDERALL) 30 MG tablet; Take 1 tablet (30 mg) by mouth daily -Ok to fill up to 2 days early     Home care instructions were reviewed with the patient. The risks, benefits and treatment options of prescribed medications or other treatments have been discussed with the patient. The patient verbalized their understanding and should call or follow up if no improvement or if they develop further problems.      Return in about 3 months (around 3/15/2021) for ADHD.    CARIE Mae CNP  [facility]      Video-Visit Details    Type of service:  Video Visit    Video End Time:9:35 AM    Originating Location (pt. Location): Home    Distant Location (provider location):  [facility]     Platform used for Video Visit: Newsblur

## 2021-02-12 DIAGNOSIS — G47.00 INSOMNIA, UNSPECIFIED TYPE: ICD-10-CM

## 2021-02-12 RX ORDER — MIRTAZAPINE 30 MG/1
TABLET, FILM COATED ORAL
Qty: 90 TABLET | Refills: 1 | Status: SHIPPED | OUTPATIENT
Start: 2021-02-12 | End: 2021-08-24

## 2021-03-09 ENCOUNTER — MYC REFILL (OUTPATIENT)
Dept: FAMILY MEDICINE | Facility: CLINIC | Age: 41
End: 2021-03-09
Payer: COMMERCIAL

## 2021-03-09 DIAGNOSIS — F90.2 ATTENTION DEFICIT HYPERACTIVITY DISORDER (ADHD), COMBINED TYPE: ICD-10-CM

## 2021-03-09 DIAGNOSIS — F15.20 STIMULANT DEPENDENCE (H): ICD-10-CM

## 2021-03-10 RX ORDER — DEXTROAMPHETAMINE SACCHARATE, AMPHETAMINE ASPARTATE, DEXTROAMPHETAMINE SULFATE AND AMPHETAMINE SULFATE 7.5; 7.5; 7.5; 7.5 MG/1; MG/1; MG/1; MG/1
30 TABLET ORAL DAILY
Qty: 30 TABLET | Refills: 0 | Status: SHIPPED | OUTPATIENT
Start: 2021-03-10 | End: 2021-07-20

## 2021-03-10 RX ORDER — DEXTROAMPHETAMINE SACCHARATE, AMPHETAMINE ASPARTATE, DEXTROAMPHETAMINE SULFATE AND AMPHETAMINE SULFATE 5; 5; 5; 5 MG/1; MG/1; MG/1; MG/1
20 TABLET ORAL DAILY
Qty: 30 TABLET | Refills: 0 | Status: SHIPPED | OUTPATIENT
Start: 2021-03-10 | End: 2021-07-20

## 2021-03-10 NOTE — TELEPHONE ENCOUNTER
Requested Prescriptions   Pending Prescriptions Disp Refills     amphetamine-dextroamphetamine (ADDERALL) 20 MG tablet 30 tablet 0     Sig: Take 1 tablet (20 mg) by mouth daily In afternoon--Ok to fill up to 2 days early       Last Written Prescription Date:  2/15/21  Last Fill Quantity: 30,  # refills: 0   Last office visit: 1/27/2020 with prescribing provider:     Future Office Visit:                  amphetamine-dextroamphetamine (ADDERALL) 30 MG tablet 30 tablet 0     Sig: Take 1 tablet (30 mg) by mouth daily -Ok to fill up to 2 days early       Last Written Prescription Date:  2/15/21  Last Fill Quantity: 30,  # refills: 0   Last office visit: 1/27/2020 with prescribing provider:     Future Office Visit:

## 2021-04-09 ENCOUNTER — VIRTUAL VISIT (OUTPATIENT)
Dept: FAMILY MEDICINE | Facility: CLINIC | Age: 41
End: 2021-04-09
Payer: COMMERCIAL

## 2021-04-09 VITALS — BODY MASS INDEX: 25.14 KG/M2 | WEIGHT: 199 LBS

## 2021-04-09 DIAGNOSIS — F90.2 ATTENTION DEFICIT HYPERACTIVITY DISORDER (ADHD), COMBINED TYPE: Primary | ICD-10-CM

## 2021-04-09 DIAGNOSIS — F15.20 STIMULANT DEPENDENCE (H): ICD-10-CM

## 2021-04-09 PROCEDURE — 99213 OFFICE O/P EST LOW 20 MIN: CPT | Mod: 95 | Performed by: NURSE PRACTITIONER

## 2021-04-09 RX ORDER — DEXTROAMPHETAMINE SACCHARATE, AMPHETAMINE ASPARTATE, DEXTROAMPHETAMINE SULFATE AND AMPHETAMINE SULFATE 5; 5; 5; 5 MG/1; MG/1; MG/1; MG/1
20 TABLET ORAL DAILY
Qty: 30 TABLET | Refills: 0 | Status: SHIPPED | OUTPATIENT
Start: 2021-05-10 | End: 2021-06-09

## 2021-04-09 RX ORDER — DEXTROAMPHETAMINE SACCHARATE, AMPHETAMINE ASPARTATE, DEXTROAMPHETAMINE SULFATE AND AMPHETAMINE SULFATE 7.5; 7.5; 7.5; 7.5 MG/1; MG/1; MG/1; MG/1
30 TABLET ORAL DAILY
Qty: 30 TABLET | Refills: 0 | Status: SHIPPED | OUTPATIENT
Start: 2021-04-09 | End: 2021-05-09

## 2021-04-09 RX ORDER — DEXTROAMPHETAMINE SACCHARATE, AMPHETAMINE ASPARTATE, DEXTROAMPHETAMINE SULFATE AND AMPHETAMINE SULFATE 5; 5; 5; 5 MG/1; MG/1; MG/1; MG/1
20 TABLET ORAL DAILY
Qty: 30 TABLET | Refills: 0 | Status: SHIPPED | OUTPATIENT
Start: 2021-06-09 | End: 2021-07-07

## 2021-04-09 RX ORDER — DEXTROAMPHETAMINE SACCHARATE, AMPHETAMINE ASPARTATE, DEXTROAMPHETAMINE SULFATE AND AMPHETAMINE SULFATE 7.5; 7.5; 7.5; 7.5 MG/1; MG/1; MG/1; MG/1
30 TABLET ORAL DAILY
Qty: 30 TABLET | Refills: 0 | Status: SHIPPED | OUTPATIENT
Start: 2021-05-10 | End: 2021-06-09

## 2021-04-09 RX ORDER — DEXTROAMPHETAMINE SACCHARATE, AMPHETAMINE ASPARTATE, DEXTROAMPHETAMINE SULFATE AND AMPHETAMINE SULFATE 7.5; 7.5; 7.5; 7.5 MG/1; MG/1; MG/1; MG/1
30 TABLET ORAL DAILY
Qty: 30 TABLET | Refills: 0 | Status: SHIPPED | OUTPATIENT
Start: 2021-06-09 | End: 2021-07-07

## 2021-04-09 RX ORDER — DEXTROAMPHETAMINE SACCHARATE, AMPHETAMINE ASPARTATE, DEXTROAMPHETAMINE SULFATE AND AMPHETAMINE SULFATE 5; 5; 5; 5 MG/1; MG/1; MG/1; MG/1
20 TABLET ORAL DAILY
Qty: 30 TABLET | Refills: 0 | Status: SHIPPED | OUTPATIENT
Start: 2021-04-09 | End: 2021-05-09

## 2021-04-09 NOTE — PROGRESS NOTES
Timmy is a 40 year old who is being evaluated via a billable video visit.      How would you like to obtain your AVS? [place]  If the video visit is dropped, the invitation should be resent by: Text to cell phone: [phone]  Will anyone else be joining your video visit? No     Video Start Time: 1:20    Assessment & Plan     Attention deficit hyperactivity disorder (ADHD), combined type  Stable with current medication.  Get blood pressure checked prior to next appointment in 3 months.   - amphetamine-dextroamphetamine (ADDERALL) 30 MG tablet; Take 1 tablet (30 mg) by mouth daily -ok to fill up to two days early  - amphetamine-dextroamphetamine (ADDERALL) 30 MG tablet; Take 1 tablet (30 mg) by mouth daily -ok to fill up to two days early  - amphetamine-dextroamphetamine (ADDERALL) 30 MG tablet; Take 1 tablet (30 mg) by mouth daily -ok to fill up to two days early  - amphetamine-dextroamphetamine (ADDERALL) 20 MG tablet; Take 1 tablet (20 mg) by mouth daily -ok to fill up to two days early  - amphetamine-dextroamphetamine (ADDERALL) 20 MG tablet; Take 1 tablet (20 mg) by mouth daily -ok to fill up to two days early  - amphetamine-dextroamphetamine (ADDERALL) 20 MG tablet; Take 1 tablet (20 mg) by mouth daily -ok to fill up to two days early    Stimulant dependence (H)  Per above.   - amphetamine-dextroamphetamine (ADDERALL) 30 MG tablet; Take 1 tablet (30 mg) by mouth daily -ok to fill up to two days early  - amphetamine-dextroamphetamine (ADDERALL) 30 MG tablet; Take 1 tablet (30 mg) by mouth daily -ok to fill up to two days early  - amphetamine-dextroamphetamine (ADDERALL) 30 MG tablet; Take 1 tablet (30 mg) by mouth daily -ok to fill up to two days early  - amphetamine-dextroamphetamine (ADDERALL) 20 MG tablet; Take 1 tablet (20 mg) by mouth daily -ok to fill up to two days early  - amphetamine-dextroamphetamine (ADDERALL) 20 MG tablet; Take 1 tablet (20 mg) by mouth daily -ok to fill up to two days early  -  amphetamine-dextroamphetamine (ADDERALL) 20 MG tablet; Take 1 tablet (20 mg) by mouth daily -ok to fill up to two days early    Return in about 3 months (around 7/9/2021) for ADHD.    CARIE Mae [facility]    Giorgio Warner is a 40 year old who presents for the following health issues     HPI     Medication Followup of  Adderall     Taking Medication as prescribed: yes    Side Effects:  None    Medication Helping Symptoms:  yes     ADHD Medication     Amphetamines Disp Start End     amphetamine-dextroamphetamine (ADDERALL) 20 MG tablet    30 tablet 3/10/2021     Sig - Route: Take 1 tablet (20 mg) by mouth daily In afternoon--Ok to fill up to 2 days early - Oral    Class: E-Prescribe    Earliest Fill Date: 3/10/2021     amphetamine-dextroamphetamine (ADDERALL) 30 MG tablet    30 tablet 3/10/2021     Sig - Route: Take 1 tablet (30 mg) by mouth daily -Ok to fill up to 2 days early - Oral    Class: E-Prescribe    Earliest Fill Date: 3/10/2021        Review of Systems   Constitutional, HEENT, cardiovascular, pulmonary, gi and gu systems are negative, except as otherwise noted.      Objective           Vitals:  No vitals were obtained today due to virtual visit.    Physical Exam   GENERAL: Healthy, alert and no distress  EYES: Eyes grossly normal to inspection.  No discharge or erythema, or obvious scleral/conjunctival abnormalities.  RESP: No audible wheeze, cough, or visible cyanosis.  No visible retractions or increased work of breathing.    SKIN: Visible skin clear. No significant rash, abnormal pigmentation or lesions.  NEURO: Cranial nerves grossly intact.  Mentation and speech appropriate for age.  PSYCH: Mentation appears normal, affect normal/bright, judgement and insight intact, normal speech and appearance well-groomed.          Video-Visit Details    Type of service:  Video Visit    Video End Time:1:28 PM    Originating Location (pt. Location): Home    Distant  Location (provider location):  [facility]     Platform used for Video Visit: Joe

## 2021-07-07 ENCOUNTER — MYC REFILL (OUTPATIENT)
Dept: FAMILY MEDICINE | Facility: CLINIC | Age: 41
End: 2021-07-07

## 2021-07-07 DIAGNOSIS — F90.2 ATTENTION DEFICIT HYPERACTIVITY DISORDER (ADHD), COMBINED TYPE: ICD-10-CM

## 2021-07-07 DIAGNOSIS — F15.20 STIMULANT DEPENDENCE (H): ICD-10-CM

## 2021-07-07 RX ORDER — DEXTROAMPHETAMINE SACCHARATE, AMPHETAMINE ASPARTATE, DEXTROAMPHETAMINE SULFATE AND AMPHETAMINE SULFATE 7.5; 7.5; 7.5; 7.5 MG/1; MG/1; MG/1; MG/1
30 TABLET ORAL DAILY
Qty: 14 TABLET | Refills: 0 | Status: SHIPPED | OUTPATIENT
Start: 2021-07-07 | End: 2021-07-20

## 2021-07-07 RX ORDER — DEXTROAMPHETAMINE SACCHARATE, AMPHETAMINE ASPARTATE, DEXTROAMPHETAMINE SULFATE AND AMPHETAMINE SULFATE 5; 5; 5; 5 MG/1; MG/1; MG/1; MG/1
20 TABLET ORAL DAILY
Qty: 14 TABLET | Refills: 0 | Status: SHIPPED | OUTPATIENT
Start: 2021-07-07 | End: 2021-07-20

## 2021-07-07 NOTE — TELEPHONE ENCOUNTER
Patient comment: Hi. Sorry for last minute on this request. I thought I had one month remaining on my prescription but I do not. I will set up an appointment up for asap.    Has Video Visit scheduled for 7/20/21.    Blank PERSAUD RN, BSN

## 2021-07-20 ENCOUNTER — VIRTUAL VISIT (OUTPATIENT)
Dept: FAMILY MEDICINE | Facility: CLINIC | Age: 41
End: 2021-07-20
Payer: COMMERCIAL

## 2021-07-20 DIAGNOSIS — F15.20 STIMULANT DEPENDENCE (H): ICD-10-CM

## 2021-07-20 DIAGNOSIS — F90.2 ATTENTION DEFICIT HYPERACTIVITY DISORDER (ADHD), COMBINED TYPE: Primary | ICD-10-CM

## 2021-07-20 PROCEDURE — 99213 OFFICE O/P EST LOW 20 MIN: CPT | Mod: 95 | Performed by: NURSE PRACTITIONER

## 2021-07-20 RX ORDER — DEXTROAMPHETAMINE SACCHARATE, AMPHETAMINE ASPARTATE, DEXTROAMPHETAMINE SULFATE AND AMPHETAMINE SULFATE 7.5; 7.5; 7.5; 7.5 MG/1; MG/1; MG/1; MG/1
30 TABLET ORAL DAILY
Qty: 30 TABLET | Refills: 0 | Status: SHIPPED | OUTPATIENT
Start: 2021-09-20 | End: 2021-10-20

## 2021-07-20 RX ORDER — DEXTROAMPHETAMINE SACCHARATE, AMPHETAMINE ASPARTATE, DEXTROAMPHETAMINE SULFATE AND AMPHETAMINE SULFATE 5; 5; 5; 5 MG/1; MG/1; MG/1; MG/1
20 TABLET ORAL DAILY
Qty: 30 TABLET | Refills: 0 | Status: SHIPPED | OUTPATIENT
Start: 2021-09-20 | End: 2021-10-20

## 2021-07-20 RX ORDER — DEXTROAMPHETAMINE SACCHARATE, AMPHETAMINE ASPARTATE, DEXTROAMPHETAMINE SULFATE AND AMPHETAMINE SULFATE 7.5; 7.5; 7.5; 7.5 MG/1; MG/1; MG/1; MG/1
30 TABLET ORAL DAILY
Qty: 30 TABLET | Refills: 0 | Status: SHIPPED | OUTPATIENT
Start: 2021-08-20 | End: 2021-09-19

## 2021-07-20 RX ORDER — DEXTROAMPHETAMINE SACCHARATE, AMPHETAMINE ASPARTATE, DEXTROAMPHETAMINE SULFATE AND AMPHETAMINE SULFATE 5; 5; 5; 5 MG/1; MG/1; MG/1; MG/1
20 TABLET ORAL DAILY
Qty: 30 TABLET | Refills: 0 | Status: SHIPPED | OUTPATIENT
Start: 2021-07-20 | End: 2021-08-19

## 2021-07-20 RX ORDER — DEXTROAMPHETAMINE SACCHARATE, AMPHETAMINE ASPARTATE, DEXTROAMPHETAMINE SULFATE AND AMPHETAMINE SULFATE 5; 5; 5; 5 MG/1; MG/1; MG/1; MG/1
20 TABLET ORAL DAILY
Qty: 30 TABLET | Refills: 0 | Status: SHIPPED | OUTPATIENT
Start: 2021-08-20 | End: 2021-09-19

## 2021-07-20 RX ORDER — DEXTROAMPHETAMINE SACCHARATE, AMPHETAMINE ASPARTATE, DEXTROAMPHETAMINE SULFATE AND AMPHETAMINE SULFATE 7.5; 7.5; 7.5; 7.5 MG/1; MG/1; MG/1; MG/1
30 TABLET ORAL DAILY
Qty: 30 TABLET | Refills: 0 | Status: SHIPPED | OUTPATIENT
Start: 2021-07-20 | End: 2021-08-19

## 2021-07-20 NOTE — PROGRESS NOTES
Liban is a 40 year old who is being evaluated via a billable video visit.      How would you like to obtain your AVS? MyChart  If the video visit is dropped, the invitation should be resent by: Text to cell phone: [phone]  Will anyone else be joining your video visit? No    Video Start Time: 11:10 AM    Assessment & Plan     Attention deficit hyperactivity disorder (ADHD), combined type  Stable with current medication. [place] Prescription Monitoring reviewed without concerns.   - amphetamine-dextroamphetamine (ADDERALL) 20 MG tablet; Take 1 tablet (20 mg) by mouth daily -ok to fill up to two days early  - amphetamine-dextroamphetamine (ADDERALL) 20 MG tablet; Take 1 tablet (20 mg) by mouth daily  - amphetamine-dextroamphetamine (ADDERALL) 20 MG tablet; Take 1 tablet (20 mg) by mouth daily  - amphetamine-dextroamphetamine (ADDERALL) 30 MG tablet; Take 1 tablet (30 mg) by mouth daily  - amphetamine-dextroamphetamine (ADDERALL) 30 MG tablet; Take 1 tablet (30 mg) by mouth daily  - amphetamine-dextroamphetamine (ADDERALL) 30 MG tablet; Take 1 tablet (30 mg) by mouth daily    Stimulant dependence (H)  Per above.   - amphetamine-dextroamphetamine (ADDERALL) 20 MG tablet; Take 1 tablet (20 mg) by mouth daily -ok to fill up to two days early  - amphetamine-dextroamphetamine (ADDERALL) 20 MG tablet; Take 1 tablet (20 mg) by mouth daily  - amphetamine-dextroamphetamine (ADDERALL) 20 MG tablet; Take 1 tablet (20 mg) by mouth daily  - amphetamine-dextroamphetamine (ADDERALL) 30 MG tablet; Take 1 tablet (30 mg) by mouth daily  - amphetamine-dextroamphetamine (ADDERALL) 30 MG tablet; Take 1 tablet (30 mg) by mouth daily  - amphetamine-dextroamphetamine (ADDERALL) 30 MG tablet; Take 1 tablet (30 mg) by mouth daily    Return in about 3 months (around 10/20/2021) for ADHD.    CARIE Mae CNP  [facility] [place] [facility]    Subjective   Liban is a 40 year old who presents for the following health issues      [facility]     ADHD Follow-Up    Date of last ADHD office visit: 4/9/2021  Status since last visit: Stable  Taking controlled (daily) medications as prescribed: Yes                       Parent/Patient Concerns with Medications: None    Had blood pressure check at back specialist and was good    ADHD Medication     Amphetamines Disp Start End     amphetamine-dextroamphetamine (ADDERALL) 20 MG tablet    14 tablet 7/7/2021     Sig - Route: Take 1 tablet (20 mg) by mouth daily -ok to fill up to two days early - Oral    Class: E-Prescribe    Earliest Fill Date: 7/7/2021    Notes to Pharmacy: Keep 7/20 appt     amphetamine-dextroamphetamine (ADDERALL) 20 MG tablet    30 tablet 3/10/2021     Sig - Route: Take 1 tablet (20 mg) by mouth daily In afternoon--Ok to fill up to 2 days early - Oral    Class: E-Prescribe    Earliest Fill Date: 3/10/2021     amphetamine-dextroamphetamine (ADDERALL) 30 MG tablet    14 tablet 7/7/2021     Sig - Route: Take 1 tablet (30 mg) by mouth daily -ok to fill up to two days early - Oral    Class: E-Prescribe    Earliest Fill Date: 7/7/2021     amphetamine-dextroamphetamine (ADDERALL) 30 MG tablet    30 tablet 3/10/2021     Sig - Route: Take 1 tablet (30 mg) by mouth daily -Ok to fill up to 2 days early - Oral    Class: E-Prescribe    Earliest Fill Date: 3/10/2021          Review of Systems   Constitutional, HEENT, cardiovascular, pulmonary, gi and gu systems are negative, except as otherwise noted.      Objective           Vitals:  No vitals were obtained today due to virtual visit.    Physical Exam   GENERAL: Healthy, alert and no distress  EYES: Eyes grossly normal to inspection.  No discharge or erythema, or obvious scleral/conjunctival abnormalities.  RESP: No audible wheeze, cough, or visible cyanosis.  No visible retractions or increased work of breathing.    SKIN: Visible skin clear. No significant rash, abnormal pigmentation or lesions.  NEURO: Cranial nerves grossly intact.  Mentation  and speech appropriate for age.  PSYCH: Mentation appears normal, affect normal/bright, judgement and insight intact, normal speech and appearance well-groomed.      Video-Visit Details    Type of service:  Video Visit    Video End Time:11:18 AM    Originating Location (pt. Location): Home    Distant Location (provider location):  [facility]     Platform used for Video Visit: Joe

## 2021-08-01 ENCOUNTER — HEALTH MAINTENANCE LETTER (OUTPATIENT)
Age: 41
End: 2021-08-01

## 2021-08-19 DIAGNOSIS — G47.00 INSOMNIA, UNSPECIFIED TYPE: ICD-10-CM

## 2021-08-24 RX ORDER — MIRTAZAPINE 30 MG/1
TABLET, FILM COATED ORAL
Qty: 90 TABLET | Refills: 1 | Status: SHIPPED | OUTPATIENT
Start: 2021-08-24 | End: 2022-01-20

## 2021-09-26 ENCOUNTER — HEALTH MAINTENANCE LETTER (OUTPATIENT)
Age: 41
End: 2021-09-26

## 2021-10-13 ENCOUNTER — VIRTUAL VISIT (OUTPATIENT)
Dept: FAMILY MEDICINE | Facility: CLINIC | Age: 41
End: 2021-10-13
Payer: COMMERCIAL

## 2021-10-13 DIAGNOSIS — F90.2 ATTENTION DEFICIT HYPERACTIVITY DISORDER (ADHD), COMBINED TYPE: Primary | ICD-10-CM

## 2021-10-13 DIAGNOSIS — F15.20 STIMULANT DEPENDENCE (H): ICD-10-CM

## 2021-10-13 PROCEDURE — 99213 OFFICE O/P EST LOW 20 MIN: CPT | Mod: 95 | Performed by: NURSE PRACTITIONER

## 2021-10-13 RX ORDER — DEXTROAMPHETAMINE SACCHARATE, AMPHETAMINE ASPARTATE, DEXTROAMPHETAMINE SULFATE AND AMPHETAMINE SULFATE 5; 5; 5; 5 MG/1; MG/1; MG/1; MG/1
20 TABLET ORAL DAILY
Qty: 30 TABLET | Refills: 0 | Status: SHIPPED | OUTPATIENT
Start: 2021-10-13 | End: 2021-11-12

## 2021-10-13 RX ORDER — DEXTROAMPHETAMINE SACCHARATE, AMPHETAMINE ASPARTATE, DEXTROAMPHETAMINE SULFATE AND AMPHETAMINE SULFATE 7.5; 7.5; 7.5; 7.5 MG/1; MG/1; MG/1; MG/1
30 TABLET ORAL DAILY
Qty: 30 TABLET | Refills: 0 | Status: SHIPPED | OUTPATIENT
Start: 2021-11-13 | End: 2021-12-13

## 2021-10-13 RX ORDER — DEXTROAMPHETAMINE SACCHARATE, AMPHETAMINE ASPARTATE, DEXTROAMPHETAMINE SULFATE AND AMPHETAMINE SULFATE 7.5; 7.5; 7.5; 7.5 MG/1; MG/1; MG/1; MG/1
30 TABLET ORAL DAILY
Qty: 30 TABLET | Refills: 0 | Status: SHIPPED | OUTPATIENT
Start: 2021-12-14 | End: 2021-12-08

## 2021-10-13 RX ORDER — DEXTROAMPHETAMINE SACCHARATE, AMPHETAMINE ASPARTATE, DEXTROAMPHETAMINE SULFATE AND AMPHETAMINE SULFATE 5; 5; 5; 5 MG/1; MG/1; MG/1; MG/1
20 TABLET ORAL DAILY
Qty: 30 TABLET | Refills: 0 | Status: SHIPPED | OUTPATIENT
Start: 2021-12-14 | End: 2021-12-08

## 2021-10-13 RX ORDER — DEXTROAMPHETAMINE SACCHARATE, AMPHETAMINE ASPARTATE, DEXTROAMPHETAMINE SULFATE AND AMPHETAMINE SULFATE 7.5; 7.5; 7.5; 7.5 MG/1; MG/1; MG/1; MG/1
30 TABLET ORAL DAILY
Qty: 30 TABLET | Refills: 0 | Status: SHIPPED | OUTPATIENT
Start: 2021-10-13 | End: 2021-11-12

## 2021-10-13 RX ORDER — DEXTROAMPHETAMINE SACCHARATE, AMPHETAMINE ASPARTATE, DEXTROAMPHETAMINE SULFATE AND AMPHETAMINE SULFATE 5; 5; 5; 5 MG/1; MG/1; MG/1; MG/1
20 TABLET ORAL DAILY
Qty: 30 TABLET | Refills: 0 | Status: SHIPPED | OUTPATIENT
Start: 2021-11-13 | End: 2021-12-08

## 2021-10-13 NOTE — PROGRESS NOTES
Liban is a 41 year old who is being evaluated via a billable video visit.      How would you like to obtain your AVS? MyChart  If the video visit is dropped, the invitation should be resent by: Text to cell phone: [phone]  Will anyone else be joining your video visit? No    Video Start Time: 10:39     Assessment & Plan     Attention deficit hyperactivity disorder (ADHD), combined type  Stable with current medication.  [place] prescription monitoring reviewed without concerns.   - amphetamine-dextroamphetamine (ADDERALL) 30 MG tablet; Take 1 tablet (30 mg) by mouth daily -ok to fill up to 2 days early  - amphetamine-dextroamphetamine (ADDERALL) 30 MG tablet; Take 1 tablet (30 mg) by mouth daily -ok to fill up to 2 days early  - amphetamine-dextroamphetamine (ADDERALL) 30 MG tablet; Take 1 tablet (30 mg) by mouth daily -ok to fill up to 2 days early  - amphetamine-dextroamphetamine (ADDERALL) 20 MG tablet; Take 1 tablet (20 mg) by mouth daily -ok to fill up to 2 days early  - amphetamine-dextroamphetamine (ADDERALL) 20 MG tablet; Take 1 tablet (20 mg) by mouth daily -ok to fill up to 2 days early  - amphetamine-dextroamphetamine (ADDERALL) 20 MG tablet; Take 1 tablet (20 mg) by mouth daily -ok to fill up to 2 days early    Stimulant dependence (H)  Stable.   - amphetamine-dextroamphetamine (ADDERALL) 30 MG tablet; Take 1 tablet (30 mg) by mouth daily -ok to fill up to 2 days early  - amphetamine-dextroamphetamine (ADDERALL) 30 MG tablet; Take 1 tablet (30 mg) by mouth daily -ok to fill up to 2 days early  - amphetamine-dextroamphetamine (ADDERALL) 30 MG tablet; Take 1 tablet (30 mg) by mouth daily -ok to fill up to 2 days early  - amphetamine-dextroamphetamine (ADDERALL) 20 MG tablet; Take 1 tablet (20 mg) by mouth daily -ok to fill up to 2 days early  - amphetamine-dextroamphetamine (ADDERALL) 20 MG tablet; Take 1 tablet (20 mg) by mouth daily -ok to fill up to 2 days early  - amphetamine-dextroamphetamine  (ADDERALL) 20 MG tablet; Take 1 tablet (20 mg) by mouth daily -ok to fill up to 2 days early    Return in about 3 months (around 1/13/2022) for ADHD.    CARIE Mae CNP  [facility]    Giorgio Louie is a 41 year old who presents for the following health issues     Chief Complaint   Patient presents with     Refill Request     Adderall        HPI     Medication Followup of ADDERALL    Taking Medication as prescribed: yes    Side Effects:  None    Medication Helping Symptoms:  yes     Taking 30 mg in the morning and 20 in the afternoon  Stable symptoms    Review of Systems   Constitutional, HEENT, cardiovascular, pulmonary, gi and gu systems are negative, except as otherwise noted.      Objective           Vitals:  No vitals were obtained today due to virtual visit.    Physical Exam   GENERAL: Healthy, alert and no distress  EYES: Eyes grossly normal to inspection.  No discharge or erythema, or obvious scleral/conjunctival abnormalities.  RESP: No audible wheeze, cough, or visible cyanosis.  No visible retractions or increased work of breathing.    SKIN: Visible skin clear. No significant rash, abnormal pigmentation or lesions.  NEURO: Cranial nerves grossly intact.  Mentation and speech appropriate for age.  PSYCH: Mentation appears normal, affect normal/bright, judgement and insight intact, normal speech and appearance well-groomed.          Video-Visit Details    Type of service:  Video Visit    Video End Time:10:46 AM    Originating Location (pt. Location): Home    Distant Location (provider location):  [facility]     Platform used for Video Visit: Joe

## 2021-12-07 ENCOUNTER — MYC MEDICAL ADVICE (OUTPATIENT)
Dept: FAMILY MEDICINE | Facility: CLINIC | Age: 41
End: 2021-12-07
Payer: COMMERCIAL

## 2021-12-07 DIAGNOSIS — F90.2 ATTENTION DEFICIT HYPERACTIVITY DISORDER (ADHD), COMBINED TYPE: ICD-10-CM

## 2021-12-07 DIAGNOSIS — F15.20 STIMULANT DEPENDENCE (H): ICD-10-CM

## 2021-12-08 RX ORDER — DEXTROAMPHETAMINE SACCHARATE, AMPHETAMINE ASPARTATE, DEXTROAMPHETAMINE SULFATE AND AMPHETAMINE SULFATE 5; 5; 5; 5 MG/1; MG/1; MG/1; MG/1
20 TABLET ORAL DAILY
Qty: 30 TABLET | Refills: 0 | Status: SHIPPED | OUTPATIENT
Start: 2021-12-08 | End: 2022-01-06

## 2021-12-08 RX ORDER — DEXTROAMPHETAMINE SACCHARATE, AMPHETAMINE ASPARTATE, DEXTROAMPHETAMINE SULFATE AND AMPHETAMINE SULFATE 7.5; 7.5; 7.5; 7.5 MG/1; MG/1; MG/1; MG/1
30 TABLET ORAL DAILY
Qty: 30 TABLET | Refills: 0 | Status: SHIPPED | OUTPATIENT
Start: 2021-12-08 | End: 2022-01-06

## 2022-01-06 ENCOUNTER — MYC REFILL (OUTPATIENT)
Dept: FAMILY MEDICINE | Facility: CLINIC | Age: 42
End: 2022-01-06
Payer: COMMERCIAL

## 2022-01-06 DIAGNOSIS — F15.20 STIMULANT DEPENDENCE (H): ICD-10-CM

## 2022-01-06 DIAGNOSIS — F90.2 ATTENTION DEFICIT HYPERACTIVITY DISORDER (ADHD), COMBINED TYPE: ICD-10-CM

## 2022-01-06 RX ORDER — DEXTROAMPHETAMINE SACCHARATE, AMPHETAMINE ASPARTATE, DEXTROAMPHETAMINE SULFATE AND AMPHETAMINE SULFATE 7.5; 7.5; 7.5; 7.5 MG/1; MG/1; MG/1; MG/1
TABLET ORAL
Qty: 30 TABLET | Refills: 0 | OUTPATIENT
Start: 2022-01-06

## 2022-01-06 RX ORDER — DEXTROAMPHETAMINE SACCHARATE, AMPHETAMINE ASPARTATE, DEXTROAMPHETAMINE SULFATE AND AMPHETAMINE SULFATE 5; 5; 5; 5 MG/1; MG/1; MG/1; MG/1
TABLET ORAL
Qty: 30 TABLET | Refills: 0 | OUTPATIENT
Start: 2022-01-06

## 2022-01-06 NOTE — TELEPHONE ENCOUNTER
Has appt 01-20-22 with PCP.  Routing refill request to provider for review/approval because:  Drug not on the [facility] refill protocol   BHAVIN Snow RN

## 2022-01-07 RX ORDER — DEXTROAMPHETAMINE SACCHARATE, AMPHETAMINE ASPARTATE, DEXTROAMPHETAMINE SULFATE AND AMPHETAMINE SULFATE 5; 5; 5; 5 MG/1; MG/1; MG/1; MG/1
20 TABLET ORAL DAILY
Qty: 10 TABLET | Refills: 0 | Status: SHIPPED | OUTPATIENT
Start: 2022-01-07 | End: 2022-01-13

## 2022-01-07 RX ORDER — DEXTROAMPHETAMINE SACCHARATE, AMPHETAMINE ASPARTATE, DEXTROAMPHETAMINE SULFATE AND AMPHETAMINE SULFATE 7.5; 7.5; 7.5; 7.5 MG/1; MG/1; MG/1; MG/1
30 TABLET ORAL DAILY
Qty: 10 TABLET | Refills: 0 | Status: SHIPPED | OUTPATIENT
Start: 2022-01-07 | End: 2022-01-13
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
## 2022-01-07 NOTE — TELEPHONE ENCOUNTER
Forwarded to FRAN Johnston, as not sure at what point PCP may be checking messages today.  BHAVIN Snow RN

## 2022-01-07 NOTE — TELEPHONE ENCOUNTER
Pt has called x 2 for this script and he said he has not gotten a call back pt is needing this today as he ran out and Pharmacy closes at 5 and closed Sat & Sun  Lupe Orn [facility]

## 2022-01-20 ENCOUNTER — VIRTUAL VISIT (OUTPATIENT)
Dept: FAMILY MEDICINE | Facility: CLINIC | Age: 42
End: 2022-01-20
Payer: COMMERCIAL

## 2022-01-20 DIAGNOSIS — F90.2 ATTENTION DEFICIT HYPERACTIVITY DISORDER (ADHD), COMBINED TYPE: ICD-10-CM

## 2022-01-20 DIAGNOSIS — F15.20 STIMULANT DEPENDENCE (H): Primary | ICD-10-CM

## 2022-01-20 DIAGNOSIS — G47.00 INSOMNIA, UNSPECIFIED TYPE: ICD-10-CM

## 2022-01-20 PROCEDURE — 99214 OFFICE O/P EST MOD 30 MIN: CPT | Mod: 95 | Performed by: NURSE PRACTITIONER

## 2022-01-20 RX ORDER — DEXTROAMPHETAMINE SACCHARATE, AMPHETAMINE ASPARTATE, DEXTROAMPHETAMINE SULFATE AND AMPHETAMINE SULFATE 5; 5; 5; 5 MG/1; MG/1; MG/1; MG/1
20 TABLET ORAL DAILY
Qty: 30 TABLET | Refills: 0 | Status: SHIPPED | OUTPATIENT
Start: 2022-03-18 | End: 2022-04-13

## 2022-01-20 RX ORDER — DEXTROAMPHETAMINE SACCHARATE, AMPHETAMINE ASPARTATE, DEXTROAMPHETAMINE SULFATE AND AMPHETAMINE SULFATE 7.5; 7.5; 7.5; 7.5 MG/1; MG/1; MG/1; MG/1
30 TABLET ORAL DAILY
Qty: 30 TABLET | Refills: 0 | Status: SHIPPED | OUTPATIENT
Start: 2022-03-18 | End: 2022-04-13

## 2022-01-20 RX ORDER — MIRTAZAPINE 30 MG/1
TABLET, FILM COATED ORAL
Qty: 90 TABLET | Refills: 3 | Status: SHIPPED | OUTPATIENT
Start: 2022-01-20 | End: 2023-05-02

## 2022-01-20 RX ORDER — DEXTROAMPHETAMINE SACCHARATE, AMPHETAMINE ASPARTATE, DEXTROAMPHETAMINE SULFATE AND AMPHETAMINE SULFATE 7.5; 7.5; 7.5; 7.5 MG/1; MG/1; MG/1; MG/1
30 TABLET ORAL DAILY
Qty: 30 TABLET | Refills: 0 | Status: SHIPPED | OUTPATIENT
Start: 2022-02-18 | End: 2022-03-20

## 2022-01-20 RX ORDER — DEXTROAMPHETAMINE SACCHARATE, AMPHETAMINE ASPARTATE, DEXTROAMPHETAMINE SULFATE AND AMPHETAMINE SULFATE 5; 5; 5; 5 MG/1; MG/1; MG/1; MG/1
20 TABLET ORAL DAILY
Qty: 30 TABLET | Refills: 0 | Status: SHIPPED | OUTPATIENT
Start: 2022-02-18 | End: 2022-03-20

## 2022-01-20 NOTE — PROGRESS NOTES
Liban is a 41 year old who is being evaluated via a billable video visit.      How would you like to obtain your AVS? MyChart  If the video visit is dropped, the invitation should be resent by: Text to cell phone: [phone]  Will anyone else be joining your video visit? No    Video Start Time: 9:53    Assessment & Plan     Insomnia, unspecified type  Stable with current mirtazapine prescription.   - mirtazapine (REMERON) 30 MG tablet; TAKE ONE TABLET BY MOUTH EVERY NIGHT AT BEDTIME    Stimulant dependence (H)  Stable.  [place] prescription monitoring reviewed without concerns.   - amphetamine-dextroamphetamine (ADDERALL) 20 MG tablet; Take 1 tablet (20 mg) by mouth daily  - amphetamine-dextroamphetamine (ADDERALL) 20 MG tablet; Take 1 tablet (20 mg) by mouth daily  - amphetamine-dextroamphetamine (ADDERALL) 30 MG tablet; Take 1 tablet (30 mg) by mouth daily  - amphetamine-dextroamphetamine (ADDERALL) 30 MG tablet; Take 1 tablet (30 mg) by mouth daily    Attention deficit hyperactivity disorder (ADHD), combined type  Stable per above, refills sent yesterday to bridge to appointment.  Follow up in 3 months.   - amphetamine-dextroamphetamine (ADDERALL) 20 MG tablet; Take 1 tablet (20 mg) by mouth daily  - amphetamine-dextroamphetamine (ADDERALL) 20 MG tablet; Take 1 tablet (20 mg) by mouth daily  - amphetamine-dextroamphetamine (ADDERALL) 30 MG tablet; Take 1 tablet (30 mg) by mouth daily  - amphetamine-dextroamphetamine (ADDERALL) 30 MG tablet; Take 1 tablet (30 mg) by mouth daily      Return in about 3 months (around 4/20/2022) for ADHD.    CARIE Mae [facility]    Subjective   Liban is a 41 year old who presents for the following health issues     HPI     ADHD Follow-Up    Date of last ADHD office visit: 10/13/2022  Status since last visit: Stable  Taking controlled (daily) medications as prescribed: Yes                       Parent/Patient Concerns with Medications:  None  ADHD Medication     Amphetamines Disp Start End     amphetamine-dextroamphetamine (ADDERALL) 20 MG tablet    30 tablet 1/18/2022     Sig - Route: Take 1 tablet (20 mg) by mouth daily - Oral    Class: E-Prescribe    Earliest Fill Date: 1/18/2022     amphetamine-dextroamphetamine (ADDERALL) 30 MG tablet    30 tablet 1/18/2022     Sig - Route: Take 1 tablet (30 mg) by mouth daily - Oral    Class: E-Prescribe    Earliest Fill Date: 1/18/2022          Medication Benefits:   Controlled symptoms: Attention span, Distractability and Finishing tasks  Uncontrolled Symptoms: None    Medication side effects:  Side effects noted: none    Uses Mirtazapine at bedtime for sleep  Helps stay asleep  No side effects noted    Review of Systems   Constitutional, HEENT, cardiovascular, pulmonary, gi and gu systems are negative, except as otherwise noted.      Objective           Vitals:  No vitals were obtained today due to virtual visit.    Physical Exam   GENERAL: Healthy, alert and no distress  EYES: Eyes grossly normal to inspection.  No discharge or erythema, or obvious scleral/conjunctival abnormalities.  RESP: No audible wheeze, cough, or visible cyanosis.  No visible retractions or increased work of breathing.    SKIN: Visible skin clear. No significant rash, abnormal pigmentation or lesions.  NEURO: Cranial nerves grossly intact.  Mentation and speech appropriate for age.  PSYCH: Mentation appears normal, affect normal/bright, judgement and insight intact, normal speech and appearance well-groomed.      Video-Visit Details    Type of service:  Video Visit    Video End Time:10:02 AM    Originating Location (pt. Location): Home    Distant Location (provider location):  [facility]     Platform used for Video Visit: Joe

## 2022-02-15 ENCOUNTER — TELEPHONE (OUTPATIENT)
Dept: FAMILY MEDICINE | Facility: CLINIC | Age: 42
End: 2022-02-15
Payer: COMMERCIAL

## 2022-02-15 NOTE — TELEPHONE ENCOUNTER
[facility] pharmacy notified ok to fill adderall 20 mg and 30 mg tabs early.   Blanca PERSAUD RN

## 2022-02-15 NOTE — TELEPHONE ENCOUNTER
Reason for Call:  Other prescription    Detailed comments: Pt is asking for his Adderall to be filled early. Pt is going out of town 2/16/22  Pt picked up his 1/20/22 RX filled 1/18/22. Pt is asking to be completed today. Pt said this was messed up from Last month.     Phone Number Patient can be reached at: Home number on file [phone] (home)    Best Time: Any Time      Can we leave a detailed message on this number? YES    Call taken on 2/15/2022 at 9:27 AM by Lupe Son

## 2022-03-14 ENCOUNTER — MYC MEDICAL ADVICE (OUTPATIENT)
Dept: FAMILY MEDICINE | Facility: CLINIC | Age: 42
End: 2022-03-14
Payer: COMMERCIAL

## 2022-04-12 ENCOUNTER — E-VISIT (OUTPATIENT)
Dept: FAMILY MEDICINE | Facility: CLINIC | Age: 42
End: 2022-04-12
Payer: COMMERCIAL

## 2022-04-12 DIAGNOSIS — Z53.9 ERRONEOUS ENCOUNTER--DISREGARD: Primary | ICD-10-CM

## 2022-04-13 ENCOUNTER — VIRTUAL VISIT (OUTPATIENT)
Dept: FAMILY MEDICINE | Facility: CLINIC | Age: 42
End: 2022-04-13
Payer: COMMERCIAL

## 2022-04-13 DIAGNOSIS — F15.20 STIMULANT DEPENDENCE (H): Primary | ICD-10-CM

## 2022-04-13 DIAGNOSIS — F90.2 ATTENTION DEFICIT HYPERACTIVITY DISORDER (ADHD), COMBINED TYPE: ICD-10-CM

## 2022-04-13 PROCEDURE — 99213 OFFICE O/P EST LOW 20 MIN: CPT | Mod: 95 | Performed by: NURSE PRACTITIONER

## 2022-04-13 RX ORDER — DEXTROAMPHETAMINE SACCHARATE, AMPHETAMINE ASPARTATE, DEXTROAMPHETAMINE SULFATE AND AMPHETAMINE SULFATE 5; 5; 5; 5 MG/1; MG/1; MG/1; MG/1
20 TABLET ORAL DAILY
Qty: 30 TABLET | Refills: 0 | Status: SHIPPED | OUTPATIENT
Start: 2022-04-14 | End: 2022-05-14

## 2022-04-13 RX ORDER — DEXTROAMPHETAMINE SACCHARATE, AMPHETAMINE ASPARTATE, DEXTROAMPHETAMINE SULFATE AND AMPHETAMINE SULFATE 7.5; 7.5; 7.5; 7.5 MG/1; MG/1; MG/1; MG/1
30 TABLET ORAL DAILY
Qty: 30 TABLET | Refills: 0 | Status: SHIPPED | OUTPATIENT
Start: 2022-06-14 | End: 2022-07-06

## 2022-04-13 RX ORDER — DEXTROAMPHETAMINE SACCHARATE, AMPHETAMINE ASPARTATE, DEXTROAMPHETAMINE SULFATE AND AMPHETAMINE SULFATE 5; 5; 5; 5 MG/1; MG/1; MG/1; MG/1
20 TABLET ORAL DAILY
Qty: 30 TABLET | Refills: 0 | Status: SHIPPED | OUTPATIENT
Start: 2022-05-14 | End: 2022-06-13

## 2022-04-13 RX ORDER — DEXTROAMPHETAMINE SACCHARATE, AMPHETAMINE ASPARTATE, DEXTROAMPHETAMINE SULFATE AND AMPHETAMINE SULFATE 5; 5; 5; 5 MG/1; MG/1; MG/1; MG/1
20 TABLET ORAL DAILY
Qty: 30 TABLET | Refills: 0 | Status: SHIPPED | OUTPATIENT
Start: 2022-06-14 | End: 2022-07-06

## 2022-04-13 RX ORDER — DEXTROAMPHETAMINE SACCHARATE, AMPHETAMINE ASPARTATE, DEXTROAMPHETAMINE SULFATE AND AMPHETAMINE SULFATE 7.5; 7.5; 7.5; 7.5 MG/1; MG/1; MG/1; MG/1
30 TABLET ORAL DAILY
Qty: 30 TABLET | Refills: 0 | Status: SHIPPED | OUTPATIENT
Start: 2022-04-14 | End: 2022-05-14

## 2022-04-13 RX ORDER — DEXTROAMPHETAMINE SACCHARATE, AMPHETAMINE ASPARTATE, DEXTROAMPHETAMINE SULFATE AND AMPHETAMINE SULFATE 7.5; 7.5; 7.5; 7.5 MG/1; MG/1; MG/1; MG/1
30 TABLET ORAL DAILY
Qty: 30 TABLET | Refills: 0 | Status: SHIPPED | OUTPATIENT
Start: 2022-05-14 | End: 2022-06-13

## 2022-04-13 NOTE — PROGRESS NOTES
Liban is a 41 year old who is being evaluated via a billable video visit.      How would you like to obtain your AVS? MyChart  If the video visit is dropped, the invitation should be resent by: Text to cell phone: [phone]  Will anyone else be joining your video visit? No    Video Start Time: 5:08    Assessment & Plan     Stimulant dependence (H)  Stable.  [place] prescription monitoring reviewed without concerns.   Needs BP check for next visit.   - amphetamine-dextroamphetamine (ADDERALL) 20 MG tablet; Take 1 tablet (20 mg) by mouth daily  - amphetamine-dextroamphetamine (ADDERALL) 20 MG tablet; Take 1 tablet (20 mg) by mouth daily  - amphetamine-dextroamphetamine (ADDERALL) 20 MG tablet; Take 1 tablet (20 mg) by mouth daily  - amphetamine-dextroamphetamine (ADDERALL) 30 MG tablet; Take 1 tablet (30 mg) by mouth daily  - amphetamine-dextroamphetamine (ADDERALL) 30 MG tablet; Take 1 tablet (30 mg) by mouth daily  - amphetamine-dextroamphetamine (ADDERALL) 30 MG tablet; Take 1 tablet (30 mg) by mouth daily    Attention deficit hyperactivity disorder (ADHD), combined type  Stable per above.   - amphetamine-dextroamphetamine (ADDERALL) 20 MG tablet; Take 1 tablet (20 mg) by mouth daily  - amphetamine-dextroamphetamine (ADDERALL) 20 MG tablet; Take 1 tablet (20 mg) by mouth daily  - amphetamine-dextroamphetamine (ADDERALL) 20 MG tablet; Take 1 tablet (20 mg) by mouth daily  - amphetamine-dextroamphetamine (ADDERALL) 30 MG tablet; Take 1 tablet (30 mg) by mouth daily  - amphetamine-dextroamphetamine (ADDERALL) 30 MG tablet; Take 1 tablet (30 mg) by mouth daily  - amphetamine-dextroamphetamine (ADDERALL) 30 MG tablet; Take 1 tablet (30 mg) by mouth daily      Return in about 3 months (around 7/13/2022) for ADHD.    CARIE Mae [facility] [place]    Subjective   Liban is a 41 year old who presents for the following health issues     History of Present Illness       Reason for visit:   Medication refill, Adderral 20 mg and 30 mg  Symptom onset:  More than a month  What makes it worse:  None  What makes it better:  Patient is happy with his current dose, and has been on it for a long time.  No concerns today     He eats 0-1 servings of fruits and vegetables daily.        Review of Systems   Constitutional, HEENT, cardiovascular, pulmonary, gi and gu systems are negative, except as otherwise noted.      Objective           Vitals:  No vitals were obtained today due to virtual visit.    Physical Exam   GENERAL: Healthy, alert and no distress  EYES: Eyes grossly normal to inspection.  No discharge or erythema, or obvious scleral/conjunctival abnormalities.  RESP: No audible wheeze, cough, or visible cyanosis.  No visible retractions or increased work of breathing.    SKIN: Visible skin clear. No significant rash, abnormal pigmentation or lesions.  NEURO: Cranial nerves grossly intact.  Mentation and speech appropriate for age.  PSYCH: Mentation appears normal, affect normal/bright, judgement and insight intact, normal speech and appearance well-groomed.          Video-Visit Details    Type of service:  Video Visit    Video End Time:5:15 PM    Originating Location (pt. Location): Home    Distant Location (provider location):  [facility]     Platform used for Video Visit: Joe

## 2022-06-09 ENCOUNTER — TELEPHONE (OUTPATIENT)
Dept: FAMILY MEDICINE | Facility: CLINIC | Age: 42
End: 2022-06-09
Payer: COMMERCIAL

## 2022-06-09 NOTE — TELEPHONE ENCOUNTER
Timmy called this morning to fill his Adderall prescriptions, they are dated to fill on 6/11/22- which is a Saturday. He is wanting us to fill them today. We could fill them tomorrow because of us not being open on Saturdays.    LPU 5/12 #30/30    Today would be 28 days since last fill.    Please call us if this is ok to fill today.    [phone]      Thank you,  Mar Mcfarland [facility] [place]  [phone]

## 2022-07-21 ENCOUNTER — VIRTUAL VISIT (OUTPATIENT)
Dept: FAMILY MEDICINE | Facility: CLINIC | Age: 42
End: 2022-07-21
Payer: COMMERCIAL

## 2022-07-21 DIAGNOSIS — F15.20 STIMULANT DEPENDENCE (H): ICD-10-CM

## 2022-07-21 DIAGNOSIS — F90.2 ATTENTION DEFICIT HYPERACTIVITY DISORDER (ADHD), COMBINED TYPE: Primary | ICD-10-CM

## 2022-07-21 PROCEDURE — 99213 OFFICE O/P EST LOW 20 MIN: CPT | Mod: 95 | Performed by: NURSE PRACTITIONER

## 2022-07-21 RX ORDER — DEXTROAMPHETAMINE SACCHARATE, AMPHETAMINE ASPARTATE, DEXTROAMPHETAMINE SULFATE AND AMPHETAMINE SULFATE 5; 5; 5; 5 MG/1; MG/1; MG/1; MG/1
20 TABLET ORAL DAILY
Qty: 30 TABLET | Refills: 0 | Status: SHIPPED | OUTPATIENT
Start: 2022-07-21 | End: 2022-08-20

## 2022-07-21 RX ORDER — DEXTROAMPHETAMINE SACCHARATE, AMPHETAMINE ASPARTATE, DEXTROAMPHETAMINE SULFATE AND AMPHETAMINE SULFATE 7.5; 7.5; 7.5; 7.5 MG/1; MG/1; MG/1; MG/1
30 TABLET ORAL DAILY
Qty: 30 TABLET | Refills: 0 | Status: SHIPPED | OUTPATIENT
Start: 2022-09-19 | End: 2022-10-19

## 2022-07-21 RX ORDER — DEXTROAMPHETAMINE SACCHARATE, AMPHETAMINE ASPARTATE, DEXTROAMPHETAMINE SULFATE AND AMPHETAMINE SULFATE 5; 5; 5; 5 MG/1; MG/1; MG/1; MG/1
20 TABLET ORAL DAILY
Qty: 30 TABLET | Refills: 0 | Status: SHIPPED | OUTPATIENT
Start: 2022-09-19 | End: 2022-10-19

## 2022-07-21 RX ORDER — DEXTROAMPHETAMINE SACCHARATE, AMPHETAMINE ASPARTATE, DEXTROAMPHETAMINE SULFATE AND AMPHETAMINE SULFATE 7.5; 7.5; 7.5; 7.5 MG/1; MG/1; MG/1; MG/1
30 TABLET ORAL DAILY
Qty: 30 TABLET | Refills: 0 | Status: SHIPPED | OUTPATIENT
Start: 2022-08-19 | End: 2022-09-18

## 2022-07-21 RX ORDER — DEXTROAMPHETAMINE SACCHARATE, AMPHETAMINE ASPARTATE, DEXTROAMPHETAMINE SULFATE AND AMPHETAMINE SULFATE 5; 5; 5; 5 MG/1; MG/1; MG/1; MG/1
20 TABLET ORAL DAILY
Qty: 30 TABLET | Refills: 0 | Status: SHIPPED | OUTPATIENT
Start: 2022-08-19 | End: 2022-09-18

## 2022-07-21 RX ORDER — DEXTROAMPHETAMINE SACCHARATE, AMPHETAMINE ASPARTATE, DEXTROAMPHETAMINE SULFATE AND AMPHETAMINE SULFATE 7.5; 7.5; 7.5; 7.5 MG/1; MG/1; MG/1; MG/1
30 TABLET ORAL DAILY
Qty: 30 TABLET | Refills: 0 | Status: SHIPPED | OUTPATIENT
Start: 2022-07-21 | End: 2022-08-20

## 2022-07-21 NOTE — PROGRESS NOTES
Liban is a 41 year old who is being evaluated via a billable video visit.      How would you like to obtain your AVS? Williamharsantiago  If the video visit is dropped, the invitation should be resent by: Text to cell phone: [phone] - text   Will anyone else be joining your video visit? No    Assessment & Plan     Attention deficit hyperactivity disorder (ADHD), combined type  Stable with current medication.  [place] monitoring reviewed without concerns.   - amphetamine-dextroamphetamine (ADDERALL) 30 MG tablet; Take 1 tablet (30 mg) by mouth daily for 30 days  - amphetamine-dextroamphetamine (ADDERALL) 30 MG tablet; Take 1 tablet (30 mg) by mouth daily for 30 days -Okay to fill up to two days early  - amphetamine-dextroamphetamine (ADDERALL) 30 MG tablet; Take 1 tablet (30 mg) by mouth daily for 30 days Okay to fill up to two days early  - amphetamine-dextroamphetamine (ADDERALL) 20 MG tablet; Take 1 tablet (20 mg) by mouth daily for 30 days  - amphetamine-dextroamphetamine (ADDERALL) 20 MG tablet; Take 1 tablet (20 mg) by mouth daily for 30 days -Okay to fill up to two days early  - amphetamine-dextroamphetamine (ADDERALL) 20 MG tablet; Take 1 tablet (20 mg) by mouth daily for 30 days -Okay to fill up to two days early    Stimulant dependence (H)  Stable, per above.   - amphetamine-dextroamphetamine (ADDERALL) 30 MG tablet; Take 1 tablet (30 mg) by mouth daily for 30 days  - amphetamine-dextroamphetamine (ADDERALL) 30 MG tablet; Take 1 tablet (30 mg) by mouth daily for 30 days -Okay to fill up to two days early  - amphetamine-dextroamphetamine (ADDERALL) 30 MG tablet; Take 1 tablet (30 mg) by mouth daily for 30 days Okay to fill up to two days early  - amphetamine-dextroamphetamine (ADDERALL) 20 MG tablet; Take 1 tablet (20 mg) by mouth daily for 30 days  - amphetamine-dextroamphetamine (ADDERALL) 20 MG tablet; Take 1 tablet (20 mg) by mouth daily for 30 days -Okay to fill up to two days early  -  amphetamine-dextroamphetamine (ADDERALL) 20 MG tablet; Take 1 tablet (20 mg) by mouth daily for 30 days -Okay to fill up to two days early      Return in about 3 months (around 10/21/2022) for ADHD.    CARIE Mae [facility]    Giorgio Louie is a 41 year old, presenting for the following health issues:  A.DCindyHCindyD      BENJAMIN     ADHD Follow-Up    Date of last ADHD office visit: 4/13/22  Status since last visit: Stable  Taking controlled (daily) medications as prescribed: Yes                   Parent/Patient Concerns with Medications: None  ADHD Medication     Amphetamines Disp Start End     amphetamine-dextroamphetamine (ADDERALL) 20 MG tablet    15 tablet 7/6/2022     Sig - Route: Take 1 tablet (20 mg) by mouth daily - Oral    Class: E-Prescribe    Earliest Fill Date: 7/6/2022     amphetamine-dextroamphetamine (ADDERALL) 30 MG tablet    15 tablet 7/6/2022     Sig - Route: Take 1 tablet (30 mg) by mouth daily - Oral    Class: E-Prescribe    Earliest Fill Date: 7/6/2022        Medication Benefits:   Controlled symptoms: Distractability and Finishing tasks    Medication side effects:  Side effects noted: none    130/80    Review of Systems   Constitutional, HEENT, cardiovascular, pulmonary, gi and gu systems are negative, except as otherwise noted.      Objective           Vitals:  No vitals were obtained today due to virtual visit.    Physical Exam   GENERAL: Healthy, alert and no distress  EYES: Eyes grossly normal to inspection.  No discharge or erythema, or obvious scleral/conjunctival abnormalities.  RESP: No audible wheeze, cough, or visible cyanosis.  No visible retractions or increased work of breathing.    SKIN: Visible skin clear. No significant rash, abnormal pigmentation or lesions.  NEURO: Cranial nerves grossly intact.  Mentation and speech appropriate for age.  PSYCH: Mentation appears normal, affect normal/bright, judgement and insight intact, normal speech and  appearance well-groomed.        Video-Visit Details    Video Start Time: 11:44    Type of service:  Video Visit    Video End Time:11:52 AM    Originating Location (pt. Location): Home    Distant Location (provider location):  [facility]     Platform used for Video Visit: Joe    .  Cindy.

## 2022-08-28 ENCOUNTER — HEALTH MAINTENANCE LETTER (OUTPATIENT)
Age: 42
End: 2022-08-28

## 2022-10-13 ENCOUNTER — VIRTUAL VISIT (OUTPATIENT)
Dept: FAMILY MEDICINE | Facility: CLINIC | Age: 42
End: 2022-10-13
Payer: COMMERCIAL

## 2022-10-13 ENCOUNTER — TELEPHONE (OUTPATIENT)
Dept: FAMILY MEDICINE | Facility: CLINIC | Age: 42
End: 2022-10-13

## 2022-10-13 DIAGNOSIS — F90.2 ATTENTION DEFICIT HYPERACTIVITY DISORDER (ADHD), COMBINED TYPE: Primary | ICD-10-CM

## 2022-10-13 DIAGNOSIS — F15.20 STIMULANT DEPENDENCE (H): ICD-10-CM

## 2022-10-13 PROCEDURE — 99213 OFFICE O/P EST LOW 20 MIN: CPT | Mod: 95 | Performed by: NURSE PRACTITIONER

## 2022-10-13 RX ORDER — DEXTROAMPHETAMINE SACCHARATE, AMPHETAMINE ASPARTATE MONOHYDRATE, DEXTROAMPHETAMINE SULFATE AND AMPHETAMINE SULFATE 7.5; 7.5; 7.5; 7.5 MG/1; MG/1; MG/1; MG/1
30 CAPSULE, EXTENDED RELEASE ORAL DAILY
Qty: 30 CAPSULE | Refills: 0 | Status: SHIPPED | OUTPATIENT
Start: 2022-11-11 | End: 2023-01-11

## 2022-10-13 RX ORDER — DEXTROAMPHETAMINE SACCHARATE, AMPHETAMINE ASPARTATE, DEXTROAMPHETAMINE SULFATE AND AMPHETAMINE SULFATE 5; 5; 5; 5 MG/1; MG/1; MG/1; MG/1
20 TABLET ORAL DAILY
Qty: 30 TABLET | Refills: 0 | Status: SHIPPED | OUTPATIENT
Start: 2022-11-11 | End: 2022-12-11

## 2022-10-13 RX ORDER — DEXTROAMPHETAMINE SACCHARATE, AMPHETAMINE ASPARTATE, DEXTROAMPHETAMINE SULFATE AND AMPHETAMINE SULFATE 5; 5; 5; 5 MG/1; MG/1; MG/1; MG/1
20 TABLET ORAL DAILY
Qty: 30 TABLET | Refills: 0 | Status: SHIPPED | OUTPATIENT
Start: 2022-10-13 | End: 2022-11-12

## 2022-10-13 RX ORDER — DEXTROAMPHETAMINE SACCHARATE, AMPHETAMINE ASPARTATE, DEXTROAMPHETAMINE SULFATE AND AMPHETAMINE SULFATE 5; 5; 5; 5 MG/1; MG/1; MG/1; MG/1
20 TABLET ORAL DAILY
Qty: 30 TABLET | Refills: 0 | Status: SHIPPED | OUTPATIENT
Start: 2022-12-12 | End: 2023-01-11

## 2022-10-13 RX ORDER — DEXTROAMPHETAMINE SACCHARATE, AMPHETAMINE ASPARTATE MONOHYDRATE, DEXTROAMPHETAMINE SULFATE AND AMPHETAMINE SULFATE 7.5; 7.5; 7.5; 7.5 MG/1; MG/1; MG/1; MG/1
30 CAPSULE, EXTENDED RELEASE ORAL DAILY
Qty: 30 CAPSULE | Refills: 0 | Status: SHIPPED | OUTPATIENT
Start: 2022-12-12 | End: 2023-01-11

## 2022-10-13 RX ORDER — DEXTROAMPHETAMINE SACCHARATE, AMPHETAMINE ASPARTATE MONOHYDRATE, DEXTROAMPHETAMINE SULFATE AND AMPHETAMINE SULFATE 7.5; 7.5; 7.5; 7.5 MG/1; MG/1; MG/1; MG/1
30 CAPSULE, EXTENDED RELEASE ORAL DAILY
Qty: 30 CAPSULE | Refills: 0 | Status: SHIPPED | OUTPATIENT
Start: 2022-10-13 | End: 2022-10-13

## 2022-10-13 RX ORDER — DEXTROAMPHETAMINE SACCHARATE, AMPHETAMINE ASPARTATE MONOHYDRATE, DEXTROAMPHETAMINE SULFATE AND AMPHETAMINE SULFATE 7.5; 7.5; 7.5; 7.5 MG/1; MG/1; MG/1; MG/1
30 CAPSULE, EXTENDED RELEASE ORAL DAILY
Qty: 30 CAPSULE | Refills: 0 | Status: SHIPPED | OUTPATIENT
Start: 2022-10-13 | End: 2022-11-12

## 2022-10-13 NOTE — PROGRESS NOTES
Liban is a 42 year old who is being evaluated via a billable video visit.      How would you like to obtain your AVS? MyChart  If the video visit is dropped, the invitation should be resent by: Text to cell phone: [phone]  Will anyone else be joining your video visit? No    Assessment & Plan     Attention deficit hyperactivity disorder (ADHD), combined type  Stable with current medication.  [place] monitoring reviewed without concerns.   - amphetamine-dextroamphetamine (ADDERALL XR) 30 MG 24 hr capsule; Take 1 capsule (30 mg) by mouth daily for 30 days  - amphetamine-dextroamphetamine (ADDERALL XR) 30 MG 24 hr capsule; Take 1 capsule (30 mg) by mouth daily for 30 days  - amphetamine-dextroamphetamine (ADDERALL XR) 30 MG 24 hr capsule; Take 1 capsule (30 mg) by mouth daily for 30 days  - amphetamine-dextroamphetamine (ADDERALL) 20 MG tablet; Take 1 tablet (20 mg) by mouth daily for 30 days  - amphetamine-dextroamphetamine (ADDERALL) 20 MG tablet; Take 1 tablet (20 mg) by mouth daily for 30 days  - amphetamine-dextroamphetamine (ADDERALL) 20 MG tablet; Take 1 tablet (20 mg) by mouth daily for 30 days    Stimulant dependence (H)  Stable per above.   - amphetamine-dextroamphetamine (ADDERALL XR) 30 MG 24 hr capsule; Take 1 capsule (30 mg) by mouth daily for 30 days  - amphetamine-dextroamphetamine (ADDERALL XR) 30 MG 24 hr capsule; Take 1 capsule (30 mg) by mouth daily for 30 days  - amphetamine-dextroamphetamine (ADDERALL XR) 30 MG 24 hr capsule; Take 1 capsule (30 mg) by mouth daily for 30 days  - amphetamine-dextroamphetamine (ADDERALL) 20 MG tablet; Take 1 tablet (20 mg) by mouth daily for 30 days  - amphetamine-dextroamphetamine (ADDERALL) 20 MG tablet; Take 1 tablet (20 mg) by mouth daily for 30 days  - amphetamine-dextroamphetamine (ADDERALL) 20 MG tablet; Take 1 tablet (20 mg) by mouth daily for 30 days    Return in about 3 months (around 1/13/2023) for ADHD.    CARIE Mae CNP  M  [facility]    Giorgio Louie is a 42 year old, presenting for the following health issues:  FRANKO AELXANDER   ADHD Follow-Up    Date of last ADHD office visit: 7/21/2022  Status since last visit: Stable  Taking controlled (daily) medications as prescribed: Yes                       Parent/Patient Concerns with Medications: None  ADHD Medication     Amphetamines Disp Start End     amphetamine-dextroamphetamine (ADDERALL) 20 MG tablet    30 tablet 9/19/2022 10/19/2022    Sig - Route: Take 1 tablet (20 mg) by mouth daily for 30 days -Okay to fill up to two days early - Oral    Class: E-Prescribe    Earliest Fill Date: 9/16/2022     amphetamine-dextroamphetamine (ADDERALL) 30 MG tablet    30 tablet 9/19/2022 10/19/2022    Sig - Route: Take 1 tablet (30 mg) by mouth daily for 30 days Okay to fill up to two days early - Oral    Class: E-Prescribe    Earliest Fill Date: 9/16/2022        Medication Benefits:   Controlled symptoms: Attention span, Distractability and Finishing tasks    Medication side effects:  Side effects noted: none      Review of Systems   Constitutional, HEENT, cardiovascular, pulmonary, gi and gu systems are negative, except as otherwise noted.      Objective           Vitals:  No vitals were obtained today due to virtual visit.    Physical Exam   GENERAL: Healthy, alert and no distress  EYES: Eyes grossly normal to inspection.  No discharge or erythema, or obvious scleral/conjunctival abnormalities.  RESP: No audible wheeze, cough, or visible cyanosis.  No visible retractions or increased work of breathing.    SKIN: Visible skin clear. No significant rash, abnormal pigmentation or lesions.  NEURO: Cranial nerves grossly intact.  Mentation and speech appropriate for age.  PSYCH: Mentation appears normal, affect normal/bright, judgement and insight intact, normal speech and appearance well-groomed.        Video-Visit Details    Video Start Time: 1:00    Type of service:   Video Visit    Video End Time:1:07 PM    Originating Location (pt. Location): Home    Distant Location (provider location):  [facility]     Platform used for Video Visit: Joe

## 2022-10-13 NOTE — TELEPHONE ENCOUNTER
Hello,  We received some prescriptions this afternoon for this patient's Adderall XR 30 mg capsule. The October fill date script was a duplicate and so we do not have a script that has a start date for November. Please resend an Adderall XR 30 mg script with a start date for November to the pharmacy.Thanks!    Denise Castellon [facility] [place]  Phone: [phone]  Fax: [phone]

## 2022-11-10 ENCOUNTER — MYC MEDICAL ADVICE (OUTPATIENT)
Dept: FAMILY MEDICINE | Facility: CLINIC | Age: 42
End: 2022-11-10

## 2022-11-10 DIAGNOSIS — F15.20 STIMULANT DEPENDENCE (H): Primary | ICD-10-CM

## 2022-11-10 DIAGNOSIS — F90.2 ATTENTION DEFICIT HYPERACTIVITY DISORDER (ADHD), COMBINED TYPE: ICD-10-CM

## 2022-11-10 RX ORDER — DEXTROAMPHETAMINE SACCHARATE, AMPHETAMINE ASPARTATE, DEXTROAMPHETAMINE SULFATE AND AMPHETAMINE SULFATE 7.5; 7.5; 7.5; 7.5 MG/1; MG/1; MG/1; MG/1
30 TABLET ORAL DAILY
Qty: 30 TABLET | Refills: 0 | Status: SHIPPED | OUTPATIENT
Start: 2022-11-10 | End: 2022-12-10

## 2022-11-10 RX ORDER — DEXTROAMPHETAMINE SACCHARATE, AMPHETAMINE ASPARTATE, DEXTROAMPHETAMINE SULFATE AND AMPHETAMINE SULFATE 7.5; 7.5; 7.5; 7.5 MG/1; MG/1; MG/1; MG/1
30 TABLET ORAL DAILY
Qty: 30 TABLET | Refills: 0 | Status: SHIPPED | OUTPATIENT
Start: 2022-12-09 | End: 2023-01-08

## 2022-11-17 NOTE — PROGRESS NOTES
Client Name: Timmy Yousif  MRN: [id number]  : 1980    Client completed the [place] Multiphasic Personality Inventory-2 (MMPI-2), a self-report personality inventory, as part of his evaluation. Validity scales indicate that the client responded in an open and consistent manner, resulting in a valid profile. The following results are likely to be an accurate reflection of Client's current functioning. Client s report on the MMPI-2 did not reflect significant elevations across any clinical scales. Although not at the clinical level, individuals with similar profiles tend to report social passivity and avoidance and loss of self-efficacy and self-confidence. They may lack drive, energy, interest, and motivation and complain of inability to complete normal tasks and duties. They may experience overly busy and inefficient cognitive activity. Individuals with similar profiles report problems and impediments to performance in employment, including tension, worry, fearfulness, and feeling overwhelmed.  
Jose RN/Nurse

## 2023-01-11 ENCOUNTER — TELEPHONE (OUTPATIENT)
Dept: FAMILY MEDICINE | Facility: CLINIC | Age: 43
End: 2023-01-11

## 2023-01-11 ENCOUNTER — VIRTUAL VISIT (OUTPATIENT)
Dept: FAMILY MEDICINE | Facility: CLINIC | Age: 43
End: 2023-01-11

## 2023-01-11 DIAGNOSIS — F15.20 STIMULANT DEPENDENCE (H): ICD-10-CM

## 2023-01-11 DIAGNOSIS — F90.2 ATTENTION DEFICIT HYPERACTIVITY DISORDER (ADHD), COMBINED TYPE: Primary | ICD-10-CM

## 2023-01-11 PROCEDURE — 99213 OFFICE O/P EST LOW 20 MIN: CPT | Mod: 95 | Performed by: NURSE PRACTITIONER

## 2023-01-11 RX ORDER — DEXTROAMPHETAMINE SACCHARATE, AMPHETAMINE ASPARTATE, DEXTROAMPHETAMINE SULFATE AND AMPHETAMINE SULFATE 5; 5; 5; 5 MG/1; MG/1; MG/1; MG/1
20 TABLET ORAL DAILY
Qty: 30 TABLET | Refills: 0 | Status: SHIPPED | OUTPATIENT
Start: 2023-01-11 | End: 2023-02-10

## 2023-01-11 RX ORDER — DEXTROAMPHETAMINE SACCHARATE, AMPHETAMINE ASPARTATE, DEXTROAMPHETAMINE SULFATE AND AMPHETAMINE SULFATE 7.5; 7.5; 7.5; 7.5 MG/1; MG/1; MG/1; MG/1
30 TABLET ORAL DAILY
Qty: 30 TABLET | Refills: 0 | Status: SHIPPED | OUTPATIENT
Start: 2023-01-11 | End: 2023-02-10

## 2023-01-11 RX ORDER — DEXTROAMPHETAMINE SACCHARATE, AMPHETAMINE ASPARTATE, DEXTROAMPHETAMINE SULFATE AND AMPHETAMINE SULFATE 7.5; 7.5; 7.5; 7.5 MG/1; MG/1; MG/1; MG/1
30 TABLET ORAL DAILY
Qty: 30 TABLET | Refills: 0 | Status: SHIPPED | OUTPATIENT
Start: 2023-03-10 | End: 2023-04-09

## 2023-01-11 RX ORDER — DEXTROAMPHETAMINE SACCHARATE, AMPHETAMINE ASPARTATE, DEXTROAMPHETAMINE SULFATE AND AMPHETAMINE SULFATE 5; 5; 5; 5 MG/1; MG/1; MG/1; MG/1
20 TABLET ORAL DAILY
Qty: 30 TABLET | Refills: 0 | Status: SHIPPED | OUTPATIENT
Start: 2023-02-09 | End: 2023-03-11

## 2023-01-11 RX ORDER — DEXTROAMPHETAMINE SACCHARATE, AMPHETAMINE ASPARTATE, DEXTROAMPHETAMINE SULFATE AND AMPHETAMINE SULFATE 3.75; 3.75; 3.75; 3.75 MG/1; MG/1; MG/1; MG/1
30 TABLET ORAL DAILY
Qty: 60 TABLET | Refills: 0 | Status: SHIPPED | OUTPATIENT
Start: 2023-01-11 | End: 2023-04-05

## 2023-01-11 RX ORDER — DEXTROAMPHETAMINE SACCHARATE, AMPHETAMINE ASPARTATE, DEXTROAMPHETAMINE SULFATE AND AMPHETAMINE SULFATE 5; 5; 5; 5 MG/1; MG/1; MG/1; MG/1
20 TABLET ORAL DAILY
Qty: 30 TABLET | Refills: 0 | Status: SHIPPED | OUTPATIENT
Start: 2023-03-10 | End: 2023-03-07

## 2023-01-11 RX ORDER — DEXTROAMPHETAMINE SACCHARATE, AMPHETAMINE ASPARTATE, DEXTROAMPHETAMINE SULFATE AND AMPHETAMINE SULFATE 7.5; 7.5; 7.5; 7.5 MG/1; MG/1; MG/1; MG/1
30 TABLET ORAL DAILY
Qty: 30 TABLET | Refills: 0 | Status: SHIPPED | OUTPATIENT
Start: 2023-02-09 | End: 2023-03-11

## 2023-01-11 RX ORDER — BUPRENORPHINE HYDROCHLORIDE AND NALOXONE HYDROCHLORIDE DIHYDRATE 8; 2 MG/1; MG/1
TABLET SUBLINGUAL
COMMUNITY
Start: 2022-12-22

## 2023-01-11 NOTE — PROGRESS NOTES
Liban is a 42 year old who is being evaluated via a billable video visit.      How would you like to obtain your AVS? MyChart  If the video visit is dropped, the invitation should be resent by: Text to cell phone: [phone]  Will anyone else be joining your video visit? No    Assessment & Plan     Attention deficit hyperactivity disorder (ADHD), combined type  Stable with current medication.  [place] prescription monitoring reviewed without concerns.  BP check for next visit.  - amphetamine-dextroamphetamine (ADDERALL) 30 MG tablet; Take 1 tablet (30 mg) by mouth daily for 30 days  - amphetamine-dextroamphetamine (ADDERALL) 30 MG tablet; Take 1 tablet (30 mg) by mouth daily for 30 days -okay to fill up to two days early  - amphetamine-dextroamphetamine (ADDERALL) 30 MG tablet; Take 1 tablet (30 mg) by mouth daily for 30 days -okay to fill up to two days early  - amphetamine-dextroamphetamine (ADDERALL) 20 MG tablet; Take 1 tablet (20 mg) by mouth daily for 30 days  - amphetamine-dextroamphetamine (ADDERALL) 20 MG tablet; Take 1 tablet (20 mg) by mouth daily for 30 days -okay to fill up to two days early  - amphetamine-dextroamphetamine (ADDERALL) 20 MG tablet; Take 1 tablet (20 mg) by mouth daily for 30 days - okay to fill up to two days early    Stimulant dependence (H)  Stable per above.   - amphetamine-dextroamphetamine (ADDERALL) 30 MG tablet; Take 1 tablet (30 mg) by mouth daily for 30 days  - amphetamine-dextroamphetamine (ADDERALL) 30 MG tablet; Take 1 tablet (30 mg) by mouth daily for 30 days -okay to fill up to two days early  - amphetamine-dextroamphetamine (ADDERALL) 30 MG tablet; Take 1 tablet (30 mg) by mouth daily for 30 days -okay to fill up to two days early  - amphetamine-dextroamphetamine (ADDERALL) 20 MG tablet; Take 1 tablet (20 mg) by mouth daily for 30 days  - amphetamine-dextroamphetamine (ADDERALL) 20 MG tablet; Take 1 tablet (20 mg) by mouth daily for 30 days -okay to fill up to two days  early  - amphetamine-dextroamphetamine (ADDERALL) 20 MG tablet; Take 1 tablet (20 mg) by mouth daily for 30 days - okay to fill up to two days early                 Return in about 3 months (around 4/11/2023) for ADHD.    CARIE Mae CNP  [facility]    Giorgio Louie is a 42 year old, presenting for the following health issues:  A.DCindyHCindyD      BENJAMIN   ADHD Follow-Up    Date of last ADHD office visit: 10/13/2022  Status since last visit: Stable  Taking controlled (daily) medications as prescribed: Yes                       Parent/Patient Concerns with Medications: None  ADHD Medication     Amphetamines Disp Start End     amphetamine-dextroamphetamine (ADDERALL XR) 30 MG 24 hr capsule    30 capsule 12/12/2022 1/11/2023    Sig - Route: Take 1 capsule (30 mg) by mouth daily for 30 days - Oral    Class: E-Prescribe    Earliest Fill Date: 12/9/2022    Notes to Pharmacy: Okay to fill up to 2 days early     amphetamine-dextroamphetamine (ADDERALL XR) 30 MG 24 hr capsule    30 capsule 11/11/2022     Sig - Route: Take 1 capsule (30 mg) by mouth daily - Oral    Class: E-Prescribe    Earliest Fill Date: 11/11/2022     amphetamine-dextroamphetamine (ADDERALL) 20 MG tablet    30 tablet 12/12/2022 1/11/2023    Sig - Route: Take 1 tablet (20 mg) by mouth daily for 30 days - Oral    Class: E-Prescribe    Earliest Fill Date: 12/9/2022    Notes to Pharmacy: Okay to fill up to 2 days early        Medication Benefits:   Controlled symptoms: Attention span, Distractability and Finishing tasks    Medication side effects:  Side effects noted: none    Review of Systems   Constitutional, HEENT, cardiovascular, pulmonary, gi and gu systems are negative, except as otherwise noted.      Objective           Vitals:  No vitals were obtained today due to virtual visit.    Physical Exam   GENERAL: Healthy, alert and no distress  EYES: Eyes grossly normal to inspection.  No discharge or erythema, or obvious  scleral/conjunctival abnormalities.  RESP: No audible wheeze, cough, or visible cyanosis.  No visible retractions or increased work of breathing.    SKIN: Visible skin clear. No significant rash, abnormal pigmentation or lesions.  NEURO: Cranial nerves grossly intact.  Mentation and speech appropriate for age.  PSYCH: Mentation appears normal, affect normal/bright, judgement and insight intact, normal speech and appearance well-groomed.            Video-Visit Details    Type of service:  Video Visit     Originating Location (pt. Location): Home  Distant Location (provider location):  On-site  Platform used for Video Visit: Joe

## 2023-01-11 NOTE — TELEPHONE ENCOUNTER
Adderall 30mg Tablets are unavailable and out of stock. Patient's insurance would cover 2 of the 15mg tablets, with a higher copay, but they are available and in stock. Can you please send a new Rx for 15mg tabs 2QD.          Thank you,  Mar Mcfarland [facility] [place]  [phone]

## 2023-01-14 ENCOUNTER — HEALTH MAINTENANCE LETTER (OUTPATIENT)
Age: 43
End: 2023-01-14

## 2023-03-07 ENCOUNTER — TELEPHONE (OUTPATIENT)
Dept: FAMILY MEDICINE | Facility: CLINIC | Age: 43
End: 2023-03-07
Payer: COMMERCIAL

## 2023-03-07 DIAGNOSIS — F90.2 ATTENTION DEFICIT HYPERACTIVITY DISORDER (ADHD), COMBINED TYPE: ICD-10-CM

## 2023-03-07 DIAGNOSIS — F15.20 STIMULANT DEPENDENCE (H): ICD-10-CM

## 2023-03-07 RX ORDER — DEXTROAMPHETAMINE SACCHARATE, AMPHETAMINE ASPARTATE, DEXTROAMPHETAMINE SULFATE AND AMPHETAMINE SULFATE 2.5; 2.5; 2.5; 2.5 MG/1; MG/1; MG/1; MG/1
20 TABLET ORAL DAILY
Qty: 60 TABLET | Refills: 0 | Status: SHIPPED | OUTPATIENT
Start: 2023-03-07 | End: 2023-04-05

## 2023-03-07 NOTE — TELEPHONE ENCOUNTER
The pharmacy is out of 20mg Amphetamine tablets.  Can you please resend Timmy's 3/7/23 prescription for two-10mg tablets to replace the prescription for 3/7/23?  He take one-20mg tablet daily, so the new prescription would need to be for two-10mg tablets daily (#60).    Please reach out to the pharmacy at [phone] with questions.    Thank You,  Trena Bennett, Pharm.D.  [facility] [place]

## 2023-04-05 ENCOUNTER — VIRTUAL VISIT (OUTPATIENT)
Dept: FAMILY MEDICINE | Facility: CLINIC | Age: 43
End: 2023-04-05
Payer: COMMERCIAL

## 2023-04-05 DIAGNOSIS — F15.20 STIMULANT DEPENDENCE (H): ICD-10-CM

## 2023-04-05 DIAGNOSIS — F90.2 ATTENTION DEFICIT HYPERACTIVITY DISORDER (ADHD), COMBINED TYPE: ICD-10-CM

## 2023-04-05 PROCEDURE — 99213 OFFICE O/P EST LOW 20 MIN: CPT | Mod: VID | Performed by: NURSE PRACTITIONER

## 2023-04-05 RX ORDER — DEXTROAMPHETAMINE SACCHARATE, AMPHETAMINE ASPARTATE, DEXTROAMPHETAMINE SULFATE AND AMPHETAMINE SULFATE 5; 5; 5; 5 MG/1; MG/1; MG/1; MG/1
20 TABLET ORAL DAILY
Qty: 30 TABLET | Refills: 0 | Status: SHIPPED | OUTPATIENT
Start: 2023-06-06 | End: 2023-06-30

## 2023-04-05 RX ORDER — DEXTROAMPHETAMINE SACCHARATE, AMPHETAMINE ASPARTATE, DEXTROAMPHETAMINE SULFATE AND AMPHETAMINE SULFATE 5; 5; 5; 5 MG/1; MG/1; MG/1; MG/1
20 TABLET ORAL DAILY
Qty: 30 TABLET | Refills: 0 | Status: SHIPPED | OUTPATIENT
Start: 2023-04-05 | End: 2023-05-05

## 2023-04-05 RX ORDER — DEXTROAMPHETAMINE SACCHARATE, AMPHETAMINE ASPARTATE, DEXTROAMPHETAMINE SULFATE AND AMPHETAMINE SULFATE 7.5; 7.5; 7.5; 7.5 MG/1; MG/1; MG/1; MG/1
30 TABLET ORAL DAILY
Qty: 30 TABLET | Refills: 0 | Status: SHIPPED | OUTPATIENT
Start: 2023-05-06 | End: 2023-06-05

## 2023-04-05 RX ORDER — DEXTROAMPHETAMINE SACCHARATE, AMPHETAMINE ASPARTATE, DEXTROAMPHETAMINE SULFATE AND AMPHETAMINE SULFATE 2.5; 2.5; 2.5; 2.5 MG/1; MG/1; MG/1; MG/1
20 TABLET ORAL DAILY
Qty: 60 TABLET | Refills: 0 | Status: CANCELLED | OUTPATIENT
Start: 2023-04-05

## 2023-04-05 RX ORDER — DEXTROAMPHETAMINE SACCHARATE, AMPHETAMINE ASPARTATE, DEXTROAMPHETAMINE SULFATE AND AMPHETAMINE SULFATE 7.5; 7.5; 7.5; 7.5 MG/1; MG/1; MG/1; MG/1
30 TABLET ORAL DAILY
Qty: 30 TABLET | Refills: 0 | Status: CANCELLED | OUTPATIENT
Start: 2023-04-05

## 2023-04-05 RX ORDER — DEXTROAMPHETAMINE SACCHARATE, AMPHETAMINE ASPARTATE, DEXTROAMPHETAMINE SULFATE AND AMPHETAMINE SULFATE 5; 5; 5; 5 MG/1; MG/1; MG/1; MG/1
20 TABLET ORAL DAILY
Qty: 30 TABLET | Refills: 0 | Status: SHIPPED | OUTPATIENT
Start: 2023-05-06 | End: 2023-06-05

## 2023-04-05 RX ORDER — DEXTROAMPHETAMINE SACCHARATE, AMPHETAMINE ASPARTATE, DEXTROAMPHETAMINE SULFATE AND AMPHETAMINE SULFATE 7.5; 7.5; 7.5; 7.5 MG/1; MG/1; MG/1; MG/1
30 TABLET ORAL DAILY
Qty: 30 TABLET | Refills: 0 | Status: SHIPPED | OUTPATIENT
Start: 2023-06-06 | End: 2023-06-30

## 2023-04-05 RX ORDER — DEXTROAMPHETAMINE SACCHARATE, AMPHETAMINE ASPARTATE, DEXTROAMPHETAMINE SULFATE AND AMPHETAMINE SULFATE 7.5; 7.5; 7.5; 7.5 MG/1; MG/1; MG/1; MG/1
30 TABLET ORAL DAILY
Qty: 30 TABLET | Refills: 0 | Status: SHIPPED | OUTPATIENT
Start: 2023-04-05 | End: 2023-05-05

## 2023-04-05 NOTE — PROGRESS NOTES
Liban is a 42 year old who is being evaluated via a billable video visit.      How would you like to obtain your AVS? MyChart  If the video visit is dropped, the invitation should be resent by: Text to cell phone: [phone]  Will anyone else be joining your video visit? No    Assessment & Plan     Attention deficit hyperactivity disorder (ADHD), combined type  Stable with current medication. [place] prescription monitoring reviewed without concerns.   - amphetamine-dextroamphetamine (ADDERALL) 30 MG tablet; Take 1 tablet (30 mg) by mouth daily for 30 days - okay to fill up to two days early  - amphetamine-dextroamphetamine (ADDERALL) 30 MG tablet; Take 1 tablet (30 mg) by mouth daily for 30 days - okay to fill up to two days early  - amphetamine-dextroamphetamine (ADDERALL) 30 MG tablet; Take 1 tablet (30 mg) by mouth daily for 30 days - okay to fill up to two days early  - amphetamine-dextroamphetamine (ADDERALL) 20 MG tablet; Take 1 tablet (20 mg) by mouth daily for 30 days - okay to fill up to two days early  - amphetamine-dextroamphetamine (ADDERALL) 20 MG tablet; Take 1 tablet (20 mg) by mouth daily for 30 days - okay to fill up to two days early  - amphetamine-dextroamphetamine (ADDERALL) 20 MG tablet; Take 1 tablet (20 mg) by mouth daily for 30 days - okay to fill up to two days early    Stimulant dependence (H)  Stable per above.   - amphetamine-dextroamphetamine (ADDERALL) 30 MG tablet; Take 1 tablet (30 mg) by mouth daily for 30 days - okay to fill up to two days early  - amphetamine-dextroamphetamine (ADDERALL) 30 MG tablet; Take 1 tablet (30 mg) by mouth daily for 30 days - okay to fill up to two days early  - amphetamine-dextroamphetamine (ADDERALL) 30 MG tablet; Take 1 tablet (30 mg) by mouth daily for 30 days - okay to fill up to two days early  - amphetamine-dextroamphetamine (ADDERALL) 20 MG tablet; Take 1 tablet (20 mg) by mouth daily for 30 days - okay to fill up to two days early  -  amphetamine-dextroamphetamine (ADDERALL) 20 MG tablet; Take 1 tablet (20 mg) by mouth daily for 30 days - okay to fill up to two days early  - amphetamine-dextroamphetamine (ADDERALL) 20 MG tablet; Take 1 tablet (20 mg) by mouth daily for 30 days - okay to fill up to two days early      CARIE Mae CNP  M [facility]    Giorgio Louie is a 42 year old, presenting for the following health issues:  A.D.H.D        4/5/2023     9:01 AM   Additional Questions   Roomed by Giulia BULLARD CMA     HPI     Medication Followup of Adderall    Taking Medication as prescribed: yes    Side Effects:  None    Medication Helping Symptoms:  yes      Review of Systems   Constitutional, HEENT, cardiovascular, pulmonary, gi and gu systems are negative, except as otherwise noted.      Objective           Vitals:  No vitals were obtained today due to virtual visit.    Physical Exam   GENERAL: Healthy, alert and no distress  EYES: Eyes grossly normal to inspection.  No discharge or erythema, or obvious scleral/conjunctival abnormalities.  RESP: No audible wheeze, cough, or visible cyanosis.  No visible retractions or increased work of breathing.    SKIN: Visible skin clear. No significant rash, abnormal pigmentation or lesions.  NEURO: Cranial nerves grossly intact.  Mentation and speech appropriate for age.  PSYCH: Mentation appears normal, affect normal/bright, judgement and insight intact, normal speech and appearance well-groomed.        Video-Visit Details    Type of service:  Video Visit     Originating Location (pt. Location): Home  Distant Location (provider location):  On-site  Platform used for Video Visit: Joe

## 2023-05-01 DIAGNOSIS — G47.00 INSOMNIA, UNSPECIFIED TYPE: ICD-10-CM

## 2023-05-02 RX ORDER — MIRTAZAPINE 30 MG/1
TABLET, FILM COATED ORAL
Qty: 90 TABLET | Refills: 3 | Status: SHIPPED | OUTPATIENT
Start: 2023-05-02 | End: 2024-05-01

## 2023-05-31 ENCOUNTER — TELEPHONE (OUTPATIENT)
Dept: FAMILY MEDICINE | Facility: CLINIC | Age: 43
End: 2023-05-31
Payer: COMMERCIAL

## 2023-05-31 NOTE — TELEPHONE ENCOUNTER
"Hello,    I have prescriptions on file for Adderall 20mg and 30mg tabs, written on 4-5-23, marked with \"Earliest fill date of 6-3-23\". In the sig, it states that it is okay to fill up to two days early. Does that mean we can actually fill as early as 6-1-23?? We are closed on Saturday, so if we can fill on Thursday or Friday that would be best. Could you please call us at [facility] pharmacy to clarify the actual earliest date that we can fill?     Thank you,  Geetha Acevedo, [facility], [place]  [phone]    "

## 2023-06-30 ENCOUNTER — MYC MEDICAL ADVICE (OUTPATIENT)
Dept: FAMILY MEDICINE | Facility: CLINIC | Age: 43
End: 2023-06-30
Payer: COMMERCIAL

## 2023-06-30 DIAGNOSIS — F90.2 ATTENTION DEFICIT HYPERACTIVITY DISORDER (ADHD), COMBINED TYPE: ICD-10-CM

## 2023-06-30 DIAGNOSIS — F15.20 STIMULANT DEPENDENCE (H): ICD-10-CM

## 2023-06-30 RX ORDER — DEXTROAMPHETAMINE SACCHARATE, AMPHETAMINE ASPARTATE, DEXTROAMPHETAMINE SULFATE AND AMPHETAMINE SULFATE 7.5; 7.5; 7.5; 7.5 MG/1; MG/1; MG/1; MG/1
30 TABLET ORAL DAILY
Qty: 15 TABLET | Refills: 0 | Status: SHIPPED | OUTPATIENT
Start: 2023-06-30 | End: 2023-10-06

## 2023-06-30 RX ORDER — DEXTROAMPHETAMINE SACCHARATE, AMPHETAMINE ASPARTATE, DEXTROAMPHETAMINE SULFATE AND AMPHETAMINE SULFATE 5; 5; 5; 5 MG/1; MG/1; MG/1; MG/1
20 TABLET ORAL DAILY
Qty: 15 TABLET | Refills: 0 | Status: SHIPPED | OUTPATIENT
Start: 2023-06-30 | End: 2023-10-06

## 2023-07-10 ENCOUNTER — VIRTUAL VISIT (OUTPATIENT)
Dept: FAMILY MEDICINE | Facility: CLINIC | Age: 43
End: 2023-07-10
Attending: NURSE PRACTITIONER
Payer: COMMERCIAL

## 2023-07-10 DIAGNOSIS — F90.2 ATTENTION DEFICIT HYPERACTIVITY DISORDER (ADHD), COMBINED TYPE: ICD-10-CM

## 2023-07-10 DIAGNOSIS — F15.20 STIMULANT DEPENDENCE (H): ICD-10-CM

## 2023-07-10 PROCEDURE — 99213 OFFICE O/P EST LOW 20 MIN: CPT | Mod: VID | Performed by: NURSE PRACTITIONER

## 2023-07-10 RX ORDER — DEXTROAMPHETAMINE SACCHARATE, AMPHETAMINE ASPARTATE, DEXTROAMPHETAMINE SULFATE AND AMPHETAMINE SULFATE 7.5; 7.5; 7.5; 7.5 MG/1; MG/1; MG/1; MG/1
30 TABLET ORAL DAILY
Qty: 30 TABLET | Refills: 0 | Status: SHIPPED | OUTPATIENT
Start: 2023-08-14 | End: 2023-09-13

## 2023-07-10 RX ORDER — DEXTROAMPHETAMINE SACCHARATE, AMPHETAMINE ASPARTATE, DEXTROAMPHETAMINE SULFATE AND AMPHETAMINE SULFATE 7.5; 7.5; 7.5; 7.5 MG/1; MG/1; MG/1; MG/1
30 TABLET ORAL DAILY
Qty: 30 TABLET | Refills: 0 | Status: SHIPPED | OUTPATIENT
Start: 2023-09-14 | End: 2023-10-14

## 2023-07-10 RX ORDER — DEXTROAMPHETAMINE SACCHARATE, AMPHETAMINE ASPARTATE, DEXTROAMPHETAMINE SULFATE AND AMPHETAMINE SULFATE 7.5; 7.5; 7.5; 7.5 MG/1; MG/1; MG/1; MG/1
30 TABLET ORAL DAILY
Qty: 30 TABLET | Refills: 0 | Status: SHIPPED | OUTPATIENT
Start: 2023-07-14 | End: 2023-08-13

## 2023-07-10 RX ORDER — DEXTROAMPHETAMINE SACCHARATE, AMPHETAMINE ASPARTATE, DEXTROAMPHETAMINE SULFATE AND AMPHETAMINE SULFATE 5; 5; 5; 5 MG/1; MG/1; MG/1; MG/1
20 TABLET ORAL DAILY
Qty: 30 TABLET | Refills: 0 | Status: SHIPPED | OUTPATIENT
Start: 2023-08-14 | End: 2023-09-13

## 2023-07-10 RX ORDER — DEXTROAMPHETAMINE SACCHARATE, AMPHETAMINE ASPARTATE, DEXTROAMPHETAMINE SULFATE AND AMPHETAMINE SULFATE 5; 5; 5; 5 MG/1; MG/1; MG/1; MG/1
20 TABLET ORAL DAILY
Qty: 30 TABLET | Refills: 0 | Status: SHIPPED | OUTPATIENT
Start: 2023-07-14 | End: 2023-08-13

## 2023-07-10 RX ORDER — DEXTROAMPHETAMINE SACCHARATE, AMPHETAMINE ASPARTATE, DEXTROAMPHETAMINE SULFATE AND AMPHETAMINE SULFATE 5; 5; 5; 5 MG/1; MG/1; MG/1; MG/1
20 TABLET ORAL DAILY
Qty: 30 TABLET | Refills: 0 | Status: SHIPPED | OUTPATIENT
Start: 2023-09-14 | End: 2023-10-14

## 2023-07-10 NOTE — PROGRESS NOTES
Liban is a 42 year old who is being evaluated via a billable video visit.      How would you like to obtain your AVS? Williamharsantiago  If the video visit is dropped, the invitation should be resent by: Text to cell phone: [phone]  Will anyone else be joining your video visit? No    Assessment & Plan     Attention deficit hyperactivity disorder (ADHD), combined type  Stable with current medication.  [place] monitoring reviewed without concerns   - amphetamine-dextroamphetamine (ADDERALL) 30 MG tablet; Take 1 tablet (30 mg) by mouth daily for 30 days - okay to fill up to two days early  - amphetamine-dextroamphetamine (ADDERALL) 30 MG tablet; Take 1 tablet (30 mg) by mouth daily for 30 days - okay to fill up to two days early  - amphetamine-dextroamphetamine (ADDERALL) 30 MG tablet; Take 1 tablet (30 mg) by mouth daily for 30 days - okay to fill up to two days early  - amphetamine-dextroamphetamine (ADDERALL) 20 MG tablet; Take 1 tablet (20 mg) by mouth daily for 30 days - okay to fill up to two days early  - amphetamine-dextroamphetamine (ADDERALL) 20 MG tablet; Take 1 tablet (20 mg) by mouth daily for 30 days - okay to fill up to two days early  - amphetamine-dextroamphetamine (ADDERALL) 20 MG tablet; Take 1 tablet (20 mg) by mouth daily for 30 days - okay to fill up to two days early    Stimulant dependence (H)  Stable per above  - amphetamine-dextroamphetamine (ADDERALL) 30 MG tablet; Take 1 tablet (30 mg) by mouth daily for 30 days - okay to fill up to two days early  - amphetamine-dextroamphetamine (ADDERALL) 30 MG tablet; Take 1 tablet (30 mg) by mouth daily for 30 days - okay to fill up to two days early  - amphetamine-dextroamphetamine (ADDERALL) 30 MG tablet; Take 1 tablet (30 mg) by mouth daily for 30 days - okay to fill up to two days early  - amphetamine-dextroamphetamine (ADDERALL) 20 MG tablet; Take 1 tablet (20 mg) by mouth daily for 30 days - okay to fill up to two days early  -  amphetamine-dextroamphetamine (ADDERALL) 20 MG tablet; Take 1 tablet (20 mg) by mouth daily for 30 days - okay to fill up to two days early  - amphetamine-dextroamphetamine (ADDERALL) 20 MG tablet; Take 1 tablet (20 mg) by mouth daily for 30 days - okay to fill up to two days early      CARIE Mae CNP  [facility]    Giorgio Louie is a 42 year old, presenting for the following health issues:  A.D.H.D        7/10/2023    10:29 AM   Additional Questions   Roomed by Giulia BULLARD CMA     HPI     Medication Followup of Adderall 20 mg and 30 mg    Taking Medication as prescribed: yes    Side Effects:  None    Medication Helping Symptoms:  yes      Review of Systems   Constitutional, HEENT, cardiovascular, pulmonary, gi and gu systems are negative, except as otherwise noted.      Objective           Vitals:  No vitals were obtained today due to virtual visit.    Physical Exam   GENERAL: Healthy, alert and no distress  EYES: Eyes grossly normal to inspection.  No discharge or erythema, or obvious scleral/conjunctival abnormalities.  RESP: No audible wheeze, cough, or visible cyanosis.  No visible retractions or increased work of breathing.    SKIN: Visible skin clear. No significant rash, abnormal pigmentation or lesions.  NEURO: Cranial nerves grossly intact.  Mentation and speech appropriate for age.  PSYCH: Mentation appears normal, affect normal/bright, judgement and insight intact, normal speech and appearance well-groomed.      Video-Visit Details    Type of service:  Video Visit     Originating Location (pt. Location): Home  Distant Location (provider location):  On-site  Platform used for Video Visit: Joe

## 2023-09-24 ENCOUNTER — HEALTH MAINTENANCE LETTER (OUTPATIENT)
Age: 43
End: 2023-09-24

## 2023-10-06 ENCOUNTER — VIRTUAL VISIT (OUTPATIENT)
Dept: FAMILY MEDICINE | Facility: CLINIC | Age: 43
End: 2023-10-06
Attending: NURSE PRACTITIONER
Payer: COMMERCIAL

## 2023-10-06 DIAGNOSIS — F90.2 ATTENTION DEFICIT HYPERACTIVITY DISORDER (ADHD), COMBINED TYPE: Primary | ICD-10-CM

## 2023-10-06 DIAGNOSIS — F15.20 STIMULANT DEPENDENCE (H): ICD-10-CM

## 2023-10-06 PROCEDURE — 99213 OFFICE O/P EST LOW 20 MIN: CPT | Mod: 95 | Performed by: NURSE PRACTITIONER

## 2023-10-06 RX ORDER — DEXTROAMPHETAMINE SACCHARATE, AMPHETAMINE ASPARTATE, DEXTROAMPHETAMINE SULFATE AND AMPHETAMINE SULFATE 7.5; 7.5; 7.5; 7.5 MG/1; MG/1; MG/1; MG/1
30 TABLET ORAL DAILY
Qty: 30 TABLET | Refills: 0 | Status: SHIPPED | OUTPATIENT
Start: 2023-11-10 | End: 2023-12-10

## 2023-10-06 RX ORDER — DEXTROAMPHETAMINE SACCHARATE, AMPHETAMINE ASPARTATE, DEXTROAMPHETAMINE SULFATE AND AMPHETAMINE SULFATE 7.5; 7.5; 7.5; 7.5 MG/1; MG/1; MG/1; MG/1
30 TABLET ORAL DAILY
Qty: 30 TABLET | Refills: 0 | Status: SHIPPED | OUTPATIENT
Start: 2023-12-08 | End: 2024-01-03

## 2023-10-06 RX ORDER — DEXTROAMPHETAMINE SACCHARATE, AMPHETAMINE ASPARTATE, DEXTROAMPHETAMINE SULFATE AND AMPHETAMINE SULFATE 5; 5; 5; 5 MG/1; MG/1; MG/1; MG/1
20 TABLET ORAL DAILY
Qty: 30 TABLET | Refills: 0 | Status: SHIPPED | OUTPATIENT
Start: 2023-12-08 | End: 2024-01-03

## 2023-10-06 RX ORDER — DEXTROAMPHETAMINE SACCHARATE, AMPHETAMINE ASPARTATE, DEXTROAMPHETAMINE SULFATE AND AMPHETAMINE SULFATE 5; 5; 5; 5 MG/1; MG/1; MG/1; MG/1
20 TABLET ORAL DAILY
Qty: 30 TABLET | Refills: 0 | Status: SHIPPED | OUTPATIENT
Start: 2023-11-10 | End: 2023-12-10

## 2023-10-06 RX ORDER — DEXTROAMPHETAMINE SACCHARATE, AMPHETAMINE ASPARTATE, DEXTROAMPHETAMINE SULFATE AND AMPHETAMINE SULFATE 5; 5; 5; 5 MG/1; MG/1; MG/1; MG/1
20 TABLET ORAL DAILY
Qty: 30 TABLET | Refills: 0 | Status: SHIPPED | OUTPATIENT
Start: 2023-10-10 | End: 2023-11-09

## 2023-10-06 RX ORDER — DEXTROAMPHETAMINE SACCHARATE, AMPHETAMINE ASPARTATE, DEXTROAMPHETAMINE SULFATE AND AMPHETAMINE SULFATE 7.5; 7.5; 7.5; 7.5 MG/1; MG/1; MG/1; MG/1
30 TABLET ORAL DAILY
Qty: 30 TABLET | Refills: 0 | Status: SHIPPED | OUTPATIENT
Start: 2023-10-10 | End: 2023-11-09

## 2023-10-06 NOTE — PROGRESS NOTES
Liban is a 42 year old who is being evaluated via a billable telephone visit.      What phone number would you like to be contacted at? [phone]  How would you like to obtain your AVS? Edwin  Distant Location (provider location):  On-site    Assessment & Plan     Attention deficit hyperactivity disorder (ADHD), combined type  Stable with current medication. [place] prescription monitoring reviewed without concerns. Recheck in 3 months with an in-person appointment.  - amphetamine-dextroamphetamine (ADDERALL) 20 MG tablet; Take 1 tablet (20 mg) by mouth daily for 30 days  - amphetamine-dextroamphetamine (ADDERALL) 20 MG tablet; Take 1 tablet (20 mg) by mouth daily for 30 days  - amphetamine-dextroamphetamine (ADDERALL) 20 MG tablet; Take 1 tablet (20 mg) by mouth daily for 30 days  - amphetamine-dextroamphetamine (ADDERALL) 30 MG tablet; Take 1 tablet (30 mg) by mouth daily for 30 days  - amphetamine-dextroamphetamine (ADDERALL) 30 MG tablet; Take 1 tablet (30 mg) by mouth daily for 30 days  - amphetamine-dextroamphetamine (ADDERALL) 30 MG tablet; Take 1 tablet (30 mg) by mouth daily for 30 days    Stimulant dependence (H)  Stable per above.   - amphetamine-dextroamphetamine (ADDERALL) 20 MG tablet; Take 1 tablet (20 mg) by mouth daily for 30 days  - amphetamine-dextroamphetamine (ADDERALL) 20 MG tablet; Take 1 tablet (20 mg) by mouth daily for 30 days  - amphetamine-dextroamphetamine (ADDERALL) 20 MG tablet; Take 1 tablet (20 mg) by mouth daily for 30 days  - amphetamine-dextroamphetamine (ADDERALL) 30 MG tablet; Take 1 tablet (30 mg) by mouth daily for 30 days  - amphetamine-dextroamphetamine (ADDERALL) 30 MG tablet; Take 1 tablet (30 mg) by mouth daily for 30 days  - amphetamine-dextroamphetamine (ADDERALL) 30 MG tablet; Take 1 tablet (30 mg) by mouth daily for 30 days      CARIE Mae [facility]    Subjective   Liban is a 42 year old, presenting for the following  health issues:  FRANKO        10/6/2023    11:00 AM   Additional Questions   Roomed by Roseanna CHAPMAN   Accompanied by Self       HPI     Medication Followup of Adderall  Taking Medication as prescribed: yes  Side Effects:  None  Medication Helping Symptoms:  yes    Review of Systems   Constitutional, HEENT, cardiovascular, pulmonary, gi and gu systems are negative, except as otherwise noted.      Objective           Vitals:  No vitals were obtained today due to virtual visit.    Physical Exam   healthy, alert, and no distress  PSYCH: Alert and oriented times 3; coherent speech, normal   rate and volume, able to articulate logical thoughts, able   to abstract reason, no tangential thoughts, no hallucinations   or delusions  His affect is normal  RESP: No cough, no audible wheezing, able to talk in full sentences  Remainder of exam unable to be completed due to telephone visits            Phone call duration: 5 minutes

## 2023-11-20 ENCOUNTER — ANCILLARY ORDERS (OUTPATIENT)
Dept: MRI IMAGING | Facility: CLINIC | Age: 43
End: 2023-11-20

## 2023-11-20 DIAGNOSIS — M54.12 CERVICAL RADICULOPATHY: Primary | ICD-10-CM

## 2023-11-21 ENCOUNTER — HOSPITAL ENCOUNTER (OUTPATIENT)
Dept: MRI IMAGING | Facility: CLINIC | Age: 43
Discharge: HOME | End: 2023-11-21
Attending: NURSE PRACTITIONER | Admitting: NURSE PRACTITIONER
Payer: COMMERCIAL

## 2023-11-21 DIAGNOSIS — M54.12 CERVICAL RADICULOPATHY: ICD-10-CM

## 2023-11-21 PROCEDURE — 72141 MRI NECK SPINE W/O DYE: CPT

## 2023-11-23 ENCOUNTER — E-VISIT (OUTPATIENT)
Dept: FAMILY MEDICINE | Facility: CLINIC | Age: 43
End: 2023-11-23
Payer: COMMERCIAL

## 2023-11-23 DIAGNOSIS — M54.12 CERVICAL RADICULOPATHY: Primary | ICD-10-CM

## 2023-11-23 PROCEDURE — 99421 OL DIG E/M SVC 5-10 MIN: CPT | Performed by: NURSE PRACTITIONER

## 2023-11-28 RX ORDER — GABAPENTIN 100 MG/1
100-300 CAPSULE ORAL 3 TIMES DAILY
Qty: 180 CAPSULE | Refills: 1 | Status: SHIPPED | OUTPATIENT
Start: 2023-11-28 | End: 2023-11-28

## 2023-11-28 RX ORDER — GABAPENTIN 100 MG/1
100-300 CAPSULE ORAL 3 TIMES DAILY
Qty: 180 CAPSULE | Refills: 1 | Status: SHIPPED | OUTPATIENT
Start: 2023-11-28 | End: 2024-05-01

## 2023-12-06 ENCOUNTER — MYC REFILL (OUTPATIENT)
Dept: FAMILY MEDICINE | Facility: CLINIC | Age: 43
End: 2023-12-06
Payer: COMMERCIAL

## 2023-12-06 DIAGNOSIS — M54.12 CERVICAL RADICULOPATHY: ICD-10-CM

## 2023-12-06 RX ORDER — GABAPENTIN 100 MG/1
100-300 CAPSULE ORAL 3 TIMES DAILY
Qty: 180 CAPSULE | Refills: 1 | OUTPATIENT
Start: 2023-12-06

## 2023-12-13 ENCOUNTER — TELEPHONE (OUTPATIENT)
Dept: FAMILY MEDICINE | Facility: CLINIC | Age: 43
End: 2023-12-13
Payer: COMMERCIAL

## 2023-12-13 ENCOUNTER — MEDICAL CORRESPONDENCE (OUTPATIENT)
Dept: HEALTH INFORMATION MANAGEMENT | Facility: CLINIC | Age: 43
End: 2023-12-13
Payer: COMMERCIAL

## 2023-12-19 ENCOUNTER — TELEPHONE (OUTPATIENT)
Dept: PALLIATIVE MEDICINE | Facility: CLINIC | Age: 43
End: 2023-12-19
Payer: COMMERCIAL

## 2023-12-19 NOTE — TELEPHONE ENCOUNTER
No answer, unable to LVM.    Spoke to patient, reminded patient of date, time and location of appointment.     Asked patient if they have received anti-biotics or vaccines in the past 7 days?     Reminded patient to eat and drink as usual.    Remained to hold any blood thinners or pain medications that they were asked to hold per nurse.     Reminded patient they will need a  for this appointment.          Marissa Curran MA  [facility]

## 2023-12-20 ENCOUNTER — ANCILLARY ORDERS (OUTPATIENT)
Dept: PALLIATIVE MEDICINE | Facility: CLINIC | Age: 43
End: 2023-12-20

## 2023-12-20 DIAGNOSIS — M54.12 CERVICAL RADICULOPATHY: Primary | ICD-10-CM

## 2024-01-03 ENCOUNTER — MYC MEDICAL ADVICE (OUTPATIENT)
Dept: FAMILY MEDICINE | Facility: CLINIC | Age: 44
End: 2024-01-03
Payer: COMMERCIAL

## 2024-01-03 DIAGNOSIS — F90.2 ATTENTION DEFICIT HYPERACTIVITY DISORDER (ADHD), COMBINED TYPE: ICD-10-CM

## 2024-01-03 DIAGNOSIS — F15.20 STIMULANT DEPENDENCE (H): ICD-10-CM

## 2024-01-04 RX ORDER — DEXTROAMPHETAMINE SACCHARATE, AMPHETAMINE ASPARTATE, DEXTROAMPHETAMINE SULFATE AND AMPHETAMINE SULFATE 5; 5; 5; 5 MG/1; MG/1; MG/1; MG/1
20 TABLET ORAL DAILY
Qty: 30 TABLET | Refills: 0 | Status: SHIPPED | OUTPATIENT
Start: 2024-01-04 | End: 2024-01-31

## 2024-01-04 RX ORDER — DEXTROAMPHETAMINE SACCHARATE, AMPHETAMINE ASPARTATE, DEXTROAMPHETAMINE SULFATE AND AMPHETAMINE SULFATE 7.5; 7.5; 7.5; 7.5 MG/1; MG/1; MG/1; MG/1
30 TABLET ORAL DAILY
Qty: 30 TABLET | Refills: 0 | Status: SHIPPED | OUTPATIENT
Start: 2024-01-04 | End: 2024-01-31

## 2024-04-01 ENCOUNTER — MYC MEDICAL ADVICE (OUTPATIENT)
Dept: FAMILY MEDICINE | Facility: CLINIC | Age: 44
End: 2024-04-01
Payer: COMMERCIAL

## 2024-04-01 DIAGNOSIS — F15.20 STIMULANT DEPENDENCE (H): ICD-10-CM

## 2024-04-01 DIAGNOSIS — F90.2 ATTENTION DEFICIT HYPERACTIVITY DISORDER (ADHD), COMBINED TYPE: ICD-10-CM

## 2024-04-02 RX ORDER — DEXTROAMPHETAMINE SACCHARATE, AMPHETAMINE ASPARTATE, DEXTROAMPHETAMINE SULFATE AND AMPHETAMINE SULFATE 7.5; 7.5; 7.5; 7.5 MG/1; MG/1; MG/1; MG/1
30 TABLET ORAL DAILY
Qty: 15 TABLET | Refills: 0 | Status: SHIPPED | OUTPATIENT
Start: 2024-04-02 | End: 2024-04-17

## 2024-04-02 RX ORDER — DEXTROAMPHETAMINE SACCHARATE, AMPHETAMINE ASPARTATE, DEXTROAMPHETAMINE SULFATE AND AMPHETAMINE SULFATE 5; 5; 5; 5 MG/1; MG/1; MG/1; MG/1
20 TABLET ORAL DAILY
Qty: 15 TABLET | Refills: 0 | Status: SHIPPED | OUTPATIENT
Start: 2024-04-02 | End: 2024-04-17

## 2024-04-17 ENCOUNTER — MYC MEDICAL ADVICE (OUTPATIENT)
Dept: FAMILY MEDICINE | Facility: CLINIC | Age: 44
End: 2024-04-17
Payer: COMMERCIAL

## 2024-04-17 DIAGNOSIS — F15.20 STIMULANT DEPENDENCE (H): ICD-10-CM

## 2024-04-17 DIAGNOSIS — F90.2 ATTENTION DEFICIT HYPERACTIVITY DISORDER (ADHD), COMBINED TYPE: ICD-10-CM

## 2024-04-17 RX ORDER — DEXTROAMPHETAMINE SACCHARATE, AMPHETAMINE ASPARTATE, DEXTROAMPHETAMINE SULFATE AND AMPHETAMINE SULFATE 7.5; 7.5; 7.5; 7.5 MG/1; MG/1; MG/1; MG/1
30 TABLET ORAL DAILY
Qty: 15 TABLET | Refills: 0 | Status: SHIPPED | OUTPATIENT
Start: 2024-04-17 | End: 2024-07-01

## 2024-04-17 RX ORDER — DEXTROAMPHETAMINE SACCHARATE, AMPHETAMINE ASPARTATE, DEXTROAMPHETAMINE SULFATE AND AMPHETAMINE SULFATE 5; 5; 5; 5 MG/1; MG/1; MG/1; MG/1
20 TABLET ORAL DAILY
Qty: 15 TABLET | Refills: 0 | Status: SHIPPED | OUTPATIENT
Start: 2024-04-17 | End: 2024-07-01

## 2024-05-01 ENCOUNTER — VIRTUAL VISIT (OUTPATIENT)
Dept: FAMILY MEDICINE | Facility: CLINIC | Age: 44
End: 2024-05-01
Payer: COMMERCIAL

## 2024-05-01 DIAGNOSIS — F90.2 ATTENTION DEFICIT HYPERACTIVITY DISORDER (ADHD), COMBINED TYPE: Primary | ICD-10-CM

## 2024-05-01 DIAGNOSIS — F15.20 STIMULANT DEPENDENCE (H): ICD-10-CM

## 2024-05-01 PROCEDURE — 99213 OFFICE O/P EST LOW 20 MIN: CPT | Mod: 95 | Performed by: NURSE PRACTITIONER

## 2024-05-01 PROCEDURE — G2211 COMPLEX E/M VISIT ADD ON: HCPCS | Mod: 95 | Performed by: NURSE PRACTITIONER

## 2024-05-01 RX ORDER — DEXTROAMPHETAMINE SACCHARATE, AMPHETAMINE ASPARTATE, DEXTROAMPHETAMINE SULFATE AND AMPHETAMINE SULFATE 7.5; 7.5; 7.5; 7.5 MG/1; MG/1; MG/1; MG/1
30 TABLET ORAL DAILY
Qty: 30 TABLET | Refills: 0 | Status: SHIPPED | OUTPATIENT
Start: 2024-05-01 | End: 2024-05-20

## 2024-05-01 RX ORDER — DEXTROAMPHETAMINE SACCHARATE, AMPHETAMINE ASPARTATE, DEXTROAMPHETAMINE SULFATE AND AMPHETAMINE SULFATE 5; 5; 5; 5 MG/1; MG/1; MG/1; MG/1
20 TABLET ORAL DAILY
Qty: 30 TABLET | Refills: 0 | Status: SHIPPED | OUTPATIENT
Start: 2024-05-01 | End: 2024-05-20

## 2024-05-01 NOTE — PROGRESS NOTES
Liban is a 43 year old who is being evaluated via a billable video visit.    How would you like to obtain your AVS? MyChart  If the video visit is dropped, the invitation should be resent by: Text to cell phone: [phone]  Will anyone else be joining your video visit? No    Assessment & Plan     Attention deficit hyperactivity disorder (ADHD), combined type  Stable with current medication.  [place] monitoring reviewed without concerns.  Due for in person evaluation, unfortunately due to family and work events has been unable keep clinic appointments.  Discussion on importance of follow-up of elevated blood pressure and heart rate at last clinic appointments as the Adderall can put him at significant risk of cardiovascular events.  Reports his wife who is an RN does check his blood pressure and heart rate at home and it has always within normal range.  Recommend monitoring at home and bring cuff into clinic to ensure accuracy.  Liban may need to look at establishing care closer to his home or job so it is easier for him to get in when needed. Plan in person evaluation in 1 month.   - amphetamine-dextroamphetamine (ADDERALL) 30 MG tablet; Take 1 tablet (30 mg) by mouth daily  - amphetamine-dextroamphetamine (ADDERALL) 20 MG tablet; Take 1 tablet (20 mg) by mouth daily    Stimulant dependence (H)  Per above.   - amphetamine-dextroamphetamine (ADDERALL) 30 MG tablet; Take 1 tablet (30 mg) by mouth daily  - amphetamine-dextroamphetamine (ADDERALL) 20 MG tablet; Take 1 tablet (20 mg) by mouth daily    The longitudinal plan of care for the diagnosis(es)/condition(s) as documented were addressed during this visit. Due to the added complexity in care, I will continue to support Liban in the subsequent management and with ongoing continuity of care.      Subjective   Liban is a 43 year old, presenting for the following health issues:  A.D.H.D        5/1/2024     9:44 AM   Additional Questions   Roomed by Hillary  CMA     History of Present Illness       Reason for visit:  Medication refill.    He eats 0-1 servings of fruits and vegetables daily.He consumes 2 sweetened beverage(s) daily.He exercises with enough effort to increase his heart rate 9 or less minutes per day.  He exercises with enough effort to increase his heart rate 3 or less days per week.   He is taking medications regularly.       ADHD Follow-up  Status since last visit: Stable  Taking medications as prescribed:  Yes  ADHD Medication       Amphetamines Disp Start End     amphetamine-dextroamphetamine (ADDERALL) 20 MG tablet 15 tablet 4/17/2024 --    Sig - Route: Take 1 tablet (20 mg) by mouth daily - Oral    Class: E-Prescribe    Earliest Fill Date: 4/17/2024     amphetamine-dextroamphetamine (ADDERALL) 30 MG tablet 15 tablet 4/17/2024 --    Sig - Route: Take 1 tablet (30 mg) by mouth daily - Oral    Class: E-Prescribe    Earliest Fill Date: 4/17/2024          Concerns with medications: None      Review of Systems  Constitutional, HEENT, cardiovascular, pulmonary, gi and gu systems are negative, except as otherwise noted.      Objective    Vitals - Patient Reported  Systolic (Patient Reported): 130  Diastolic (Patient Reported): 80  Pulse (Patient Reported): 85      Vitals:  No vitals were obtained today due to virtual visit.    Physical Exam   GENERAL: alert and no distress  EYES: Eyes grossly normal to inspection.  No discharge or erythema, or obvious scleral/conjunctival abnormalities.  RESP: No audible wheeze, cough, or visible cyanosis.    SKIN: Visible skin clear. No significant rash, abnormal pigmentation or lesions.  NEURO: Cranial nerves grossly intact.  Mentation and speech appropriate for age.  PSYCH: Appropriate affect, tone, and pace of words        Video-Visit Details    Type of service:  Video Visit   Originating Location (pt. Location): Home  Distant Location (provider location):  On-site  Platform used for Video Visit: Joe Grace  Electronically by: CARIE Mae CNP

## 2024-05-17 ENCOUNTER — MYC MEDICAL ADVICE (OUTPATIENT)
Dept: FAMILY MEDICINE | Facility: CLINIC | Age: 44
End: 2024-05-17
Payer: COMMERCIAL

## 2024-05-17 DIAGNOSIS — F90.2 ATTENTION DEFICIT HYPERACTIVITY DISORDER (ADHD), COMBINED TYPE: ICD-10-CM

## 2024-05-17 DIAGNOSIS — F15.20 STIMULANT DEPENDENCE (H): ICD-10-CM

## 2024-05-21 DIAGNOSIS — F15.20 STIMULANT DEPENDENCE (H): ICD-10-CM

## 2024-05-21 DIAGNOSIS — F90.2 ATTENTION DEFICIT HYPERACTIVITY DISORDER (ADHD), COMBINED TYPE: ICD-10-CM

## 2024-05-21 RX ORDER — DEXTROAMPHETAMINE SACCHARATE, AMPHETAMINE ASPARTATE, DEXTROAMPHETAMINE SULFATE AND AMPHETAMINE SULFATE 7.5; 7.5; 7.5; 7.5 MG/1; MG/1; MG/1; MG/1
30 TABLET ORAL DAILY
Qty: 30 TABLET | Refills: 0 | Status: SHIPPED | OUTPATIENT
Start: 2024-05-24 | End: 2024-06-23

## 2024-05-21 RX ORDER — DEXTROAMPHETAMINE SACCHARATE, AMPHETAMINE ASPARTATE, DEXTROAMPHETAMINE SULFATE AND AMPHETAMINE SULFATE 7.5; 7.5; 7.5; 7.5 MG/1; MG/1; MG/1; MG/1
30 TABLET ORAL DAILY
Qty: 30 TABLET | Refills: 0 | OUTPATIENT
Start: 2024-05-21

## 2024-05-21 RX ORDER — DEXTROAMPHETAMINE SACCHARATE, AMPHETAMINE ASPARTATE, DEXTROAMPHETAMINE SULFATE AND AMPHETAMINE SULFATE 5; 5; 5; 5 MG/1; MG/1; MG/1; MG/1
20 TABLET ORAL DAILY
Qty: 30 TABLET | Refills: 0 | Status: SHIPPED | OUTPATIENT
Start: 2024-05-24 | End: 2024-06-23

## 2024-05-21 RX ORDER — DEXTROAMPHETAMINE SACCHARATE, AMPHETAMINE ASPARTATE, DEXTROAMPHETAMINE SULFATE AND AMPHETAMINE SULFATE 5; 5; 5; 5 MG/1; MG/1; MG/1; MG/1
20 TABLET ORAL DAILY
Qty: 30 TABLET | Refills: 0 | OUTPATIENT
Start: 2024-05-21

## 2024-05-21 NOTE — TELEPHONE ENCOUNTER
Pls see Nexalogyt message below.     Last Written Prescription Date:  5/1/24  Last Fill Quantity: 30,  # refills: 0   Last office visit: Visit date not found ; last virtual visit: 5/1/2024 with prescribing provider:     Future Office Visit:   Next 5 appointments (look out 90 days)      Jun 20, 2024 11:00 AM  (Arrive by 10:40 AM)  Provider Visit with CARIE Mae CNP  [facility] ([facility] ) [address] [place] [id number] [phone]           Message routed to provider for consideration.     Julie Behrendt RN

## 2024-06-17 ENCOUNTER — MYC MEDICAL ADVICE (OUTPATIENT)
Dept: FAMILY MEDICINE | Facility: CLINIC | Age: 44
End: 2024-06-17
Payer: COMMERCIAL

## 2024-07-01 ENCOUNTER — MYC MEDICAL ADVICE (OUTPATIENT)
Dept: FAMILY MEDICINE | Facility: CLINIC | Age: 44
End: 2024-07-01
Payer: COMMERCIAL

## 2024-07-01 DIAGNOSIS — F90.2 ATTENTION DEFICIT HYPERACTIVITY DISORDER (ADHD), COMBINED TYPE: ICD-10-CM

## 2024-07-01 DIAGNOSIS — F15.20 STIMULANT DEPENDENCE (H): ICD-10-CM

## 2024-07-02 RX ORDER — DEXTROAMPHETAMINE SACCHARATE, AMPHETAMINE ASPARTATE, DEXTROAMPHETAMINE SULFATE AND AMPHETAMINE SULFATE 5; 5; 5; 5 MG/1; MG/1; MG/1; MG/1
20 TABLET ORAL DAILY
Qty: 30 TABLET | Refills: 0 | Status: SHIPPED | OUTPATIENT
Start: 2024-07-02 | End: 2024-08-05

## 2024-07-02 RX ORDER — DEXTROAMPHETAMINE SACCHARATE, AMPHETAMINE ASPARTATE, DEXTROAMPHETAMINE SULFATE AND AMPHETAMINE SULFATE 7.5; 7.5; 7.5; 7.5 MG/1; MG/1; MG/1; MG/1
30 TABLET ORAL DAILY
Qty: 30 TABLET | Refills: 0 | Status: SHIPPED | OUTPATIENT
Start: 2024-07-02 | End: 2024-08-05

## 2024-07-30 ENCOUNTER — MYC MEDICAL ADVICE (OUTPATIENT)
Dept: FAMILY MEDICINE | Facility: CLINIC | Age: 44
End: 2024-07-30
Payer: COMMERCIAL

## 2024-07-30 DIAGNOSIS — F90.2 ATTENTION DEFICIT HYPERACTIVITY DISORDER (ADHD), COMBINED TYPE: ICD-10-CM

## 2024-07-30 DIAGNOSIS — F15.20 STIMULANT DEPENDENCE (H): ICD-10-CM

## 2024-08-06 RX ORDER — DEXTROAMPHETAMINE SACCHARATE, AMPHETAMINE ASPARTATE, DEXTROAMPHETAMINE SULFATE AND AMPHETAMINE SULFATE 5; 5; 5; 5 MG/1; MG/1; MG/1; MG/1
20 TABLET ORAL DAILY
Qty: 30 TABLET | Refills: 0 | Status: SHIPPED | OUTPATIENT
Start: 2024-08-06 | End: 2024-09-05

## 2024-08-06 RX ORDER — DEXTROAMPHETAMINE SACCHARATE, AMPHETAMINE ASPARTATE, DEXTROAMPHETAMINE SULFATE AND AMPHETAMINE SULFATE 7.5; 7.5; 7.5; 7.5 MG/1; MG/1; MG/1; MG/1
30 TABLET ORAL DAILY
Qty: 30 TABLET | Refills: 0 | Status: SHIPPED | OUTPATIENT
Start: 2024-08-06 | End: 2024-09-09

## 2024-08-06 NOTE — TELEPHONE ENCOUNTER
This was routed to Dr Chavez yesterday and again today but it was not done before she left for the day. Please advise if he can get a bridge refill before he leaves tomorrow

## 2024-09-04 ENCOUNTER — MYC MEDICAL ADVICE (OUTPATIENT)
Dept: FAMILY MEDICINE | Facility: CLINIC | Age: 44
End: 2024-09-04
Payer: COMMERCIAL

## 2024-09-04 DIAGNOSIS — F90.2 ATTENTION DEFICIT HYPERACTIVITY DISORDER (ADHD), COMBINED TYPE: ICD-10-CM

## 2024-09-04 DIAGNOSIS — F15.20 STIMULANT DEPENDENCE (H): ICD-10-CM

## 2024-09-06 RX ORDER — DEXTROAMPHETAMINE SACCHARATE, AMPHETAMINE ASPARTATE, DEXTROAMPHETAMINE SULFATE AND AMPHETAMINE SULFATE 5; 5; 5; 5 MG/1; MG/1; MG/1; MG/1
20 TABLET ORAL DAILY
Qty: 15 TABLET | Refills: 0 | Status: SHIPPED | OUTPATIENT
Start: 2024-09-06 | End: 2024-09-26

## 2024-09-09 DIAGNOSIS — F90.2 ATTENTION DEFICIT HYPERACTIVITY DISORDER (ADHD), COMBINED TYPE: ICD-10-CM

## 2024-09-09 DIAGNOSIS — F15.20 STIMULANT DEPENDENCE (H): ICD-10-CM

## 2024-09-09 RX ORDER — DEXTROAMPHETAMINE SACCHARATE, AMPHETAMINE ASPARTATE, DEXTROAMPHETAMINE SULFATE AND AMPHETAMINE SULFATE 7.5; 7.5; 7.5; 7.5 MG/1; MG/1; MG/1; MG/1
30 TABLET ORAL DAILY
Qty: 15 TABLET | Refills: 0 | Status: SHIPPED | OUTPATIENT
Start: 2024-09-09 | End: 2024-09-26

## 2024-09-09 NOTE — TELEPHONE ENCOUNTER
Pls see Anpath Groupt message below.       Last Written Prescription Date:  8/6/24  Last Fill Quantity: 30,  # refills: 0   Last office visit: Visit date not found ; last virtual visit: 5/1/2024 with prescribing provider:     Future Office Visit:   Next 5 appointments (look out 90 days)      Sep 18, 2024 2:00 PM  (Arrive by 1:40 PM)  Provider Visit with CARIE Mae CNP  [facility] ([facility] ) [address] [place] [id number] [phone]           RX pended, message routed to provider for consideration of refill.   Julie Behrendt RN

## 2024-09-25 ENCOUNTER — MYC MEDICAL ADVICE (OUTPATIENT)
Dept: FAMILY MEDICINE | Facility: CLINIC | Age: 44
End: 2024-09-25
Payer: COMMERCIAL

## 2024-09-25 DIAGNOSIS — F90.2 ATTENTION DEFICIT HYPERACTIVITY DISORDER (ADHD), COMBINED TYPE: ICD-10-CM

## 2024-09-25 DIAGNOSIS — F15.20 STIMULANT DEPENDENCE (H): ICD-10-CM

## 2024-09-30 RX ORDER — DEXTROAMPHETAMINE SACCHARATE, AMPHETAMINE ASPARTATE, DEXTROAMPHETAMINE SULFATE AND AMPHETAMINE SULFATE 7.5; 7.5; 7.5; 7.5 MG/1; MG/1; MG/1; MG/1
30 TABLET ORAL DAILY
Qty: 30 TABLET | Refills: 0 | Status: SHIPPED | OUTPATIENT
Start: 2024-09-30

## 2024-09-30 RX ORDER — DEXTROAMPHETAMINE SACCHARATE, AMPHETAMINE ASPARTATE, DEXTROAMPHETAMINE SULFATE AND AMPHETAMINE SULFATE 5; 5; 5; 5 MG/1; MG/1; MG/1; MG/1
20 TABLET ORAL DAILY
Qty: 30 TABLET | Refills: 0 | Status: SHIPPED | OUTPATIENT
Start: 2024-09-30

## 2024-11-07 ENCOUNTER — HOSPITAL ENCOUNTER (EMERGENCY)
Facility: CLINIC | Age: 44
Discharge: HOME | End: 2024-11-08
Attending: EMERGENCY MEDICINE | Admitting: EMERGENCY MEDICINE
Payer: COMMERCIAL

## 2024-11-07 DIAGNOSIS — R41.82 ALTERED MENTAL STATUS, UNSPECIFIED ALTERED MENTAL STATUS TYPE: ICD-10-CM

## 2024-11-07 DIAGNOSIS — F10.90 ALCOHOL USE DISORDER: ICD-10-CM

## 2024-11-07 DIAGNOSIS — F10.920 ALCOHOLIC INTOXICATION WITHOUT COMPLICATION (H): ICD-10-CM

## 2024-11-07 LAB
ALBUMIN SERPL BCG-MCNC: 4.8 G/DL (ref 3.5–5.2)
ALBUMIN UR-MCNC: NEGATIVE MG/DL
ALP SERPL-CCNC: 92 U/L (ref 40–150)
ALT SERPL W P-5'-P-CCNC: 145 U/L (ref 0–70)
AMPHETAMINES UR QL SCN: ABNORMAL
ANION GAP SERPL CALCULATED.3IONS-SCNC: 16 MMOL/L (ref 7–15)
APPEARANCE UR: CLEAR
AST SERPL W P-5'-P-CCNC: 100 U/L (ref 0–45)
BARBITURATES UR QL SCN: ABNORMAL
BASOPHILS # BLD AUTO: 0.1 10E3/UL (ref 0–0.2)
BASOPHILS NFR BLD AUTO: 2 %
BENZODIAZ UR QL SCN: ABNORMAL
BILIRUB SERPL-MCNC: 0.2 MG/DL
BILIRUB UR QL STRIP: NEGATIVE
BUN SERPL-MCNC: 21 MG/DL (ref 6–20)
BZE UR QL SCN: ABNORMAL
CALCIUM SERPL-MCNC: 9.3 MG/DL (ref 8.8–10.4)
CANNABINOIDS UR QL SCN: ABNORMAL
CHLORIDE SERPL-SCNC: 101 MMOL/L (ref 98–107)
COLOR UR AUTO: YELLOW
CREAT SERPL-MCNC: 0.96 MG/DL (ref 0.67–1.17)
EGFRCR SERPLBLD CKD-EPI 2021: >90 ML/MIN/1.73M2
EOSINOPHIL # BLD AUTO: 0.2 10E3/UL (ref 0–0.7)
EOSINOPHIL NFR BLD AUTO: 3 %
ERYTHROCYTE [DISTWIDTH] IN BLOOD BY AUTOMATED COUNT: 13.1 % (ref 10–15)
ETHANOL SERPL-MCNC: 0.38 G/DL
FENTANYL UR QL: ABNORMAL
GLUCOSE SERPL-MCNC: 120 MG/DL (ref 70–99)
GLUCOSE UR STRIP-MCNC: NEGATIVE MG/DL
HCO3 SERPL-SCNC: 27 MMOL/L (ref 22–29)
HCT VFR BLD AUTO: 39.2 % (ref 40–53)
HGB BLD-MCNC: 13.9 G/DL (ref 13.3–17.7)
HGB UR QL STRIP: NEGATIVE
IMM GRANULOCYTES # BLD: 0 10E3/UL
IMM GRANULOCYTES NFR BLD: 0 %
KETONES UR STRIP-MCNC: NEGATIVE MG/DL
LEUKOCYTE ESTERASE UR QL STRIP: NEGATIVE
LYMPHOCYTES # BLD AUTO: 1.4 10E3/UL (ref 0.8–5.3)
LYMPHOCYTES NFR BLD AUTO: 23 %
MAGNESIUM SERPL-MCNC: 2.2 MG/DL (ref 1.7–2.3)
MCH RBC QN AUTO: 34.1 PG (ref 26.5–33)
MCHC RBC AUTO-ENTMCNC: 35.5 G/DL (ref 31.5–36.5)
MCV RBC AUTO: 96 FL (ref 78–100)
MONOCYTES # BLD AUTO: 0.8 10E3/UL (ref 0–1.3)
MONOCYTES NFR BLD AUTO: 12 %
NEUTROPHILS # BLD AUTO: 3.8 10E3/UL (ref 1.6–8.3)
NEUTROPHILS NFR BLD AUTO: 60 %
NITRATE UR QL: NEGATIVE
NRBC # BLD AUTO: 0 10E3/UL
NRBC BLD AUTO-RTO: 0 /100
OPIATES UR QL SCN: ABNORMAL
PCP QUAL URINE (ROCHE): ABNORMAL
PH UR STRIP: 6 [PH] (ref 5–7)
PLATELET # BLD AUTO: 277 10E3/UL (ref 150–450)
POTASSIUM SERPL-SCNC: 4.1 MMOL/L (ref 3.4–5.3)
PROT SERPL-MCNC: 7.5 G/DL (ref 6.4–8.3)
RBC # BLD AUTO: 4.08 10E6/UL (ref 4.4–5.9)
SODIUM SERPL-SCNC: 144 MMOL/L (ref 135–145)
SP GR UR STRIP: 1.01 (ref 1–1.03)
TROPONIN T SERPL HS-MCNC: 11 NG/L
UROBILINOGEN UR STRIP-MCNC: NORMAL MG/DL
WBC # BLD AUTO: 6.3 10E3/UL (ref 4–11)

## 2024-11-07 PROCEDURE — 96361 HYDRATE IV INFUSION ADD-ON: CPT

## 2024-11-07 PROCEDURE — 36415 COLL VENOUS BLD VENIPUNCTURE: CPT | Performed by: EMERGENCY MEDICINE

## 2024-11-07 PROCEDURE — 99285 EMERGENCY DEPT VISIT HI MDM: CPT | Mod: 25

## 2024-11-07 PROCEDURE — 96375 TX/PRO/DX INJ NEW DRUG ADDON: CPT

## 2024-11-07 PROCEDURE — 96374 THER/PROPH/DIAG INJ IV PUSH: CPT

## 2024-11-07 PROCEDURE — 83735 ASSAY OF MAGNESIUM: CPT | Performed by: EMERGENCY MEDICINE

## 2024-11-07 PROCEDURE — 82077 ASSAY SPEC XCP UR&BREATH IA: CPT | Performed by: EMERGENCY MEDICINE

## 2024-11-07 PROCEDURE — 85025 COMPLETE CBC W/AUTO DIFF WBC: CPT | Performed by: EMERGENCY MEDICINE

## 2024-11-07 PROCEDURE — 80307 DRUG TEST PRSMV CHEM ANLYZR: CPT | Performed by: EMERGENCY MEDICINE

## 2024-11-07 PROCEDURE — 258N000003 HC RX IP 258 OP 636: Performed by: EMERGENCY MEDICINE

## 2024-11-07 PROCEDURE — 250N000011 HC RX IP 250 OP 636: Performed by: EMERGENCY MEDICINE

## 2024-11-07 PROCEDURE — 93005 ELECTROCARDIOGRAM TRACING: CPT

## 2024-11-07 PROCEDURE — 99284 EMERGENCY DEPT VISIT MOD MDM: CPT | Performed by: EMERGENCY MEDICINE

## 2024-11-07 PROCEDURE — 84484 ASSAY OF TROPONIN QUANT: CPT | Performed by: EMERGENCY MEDICINE

## 2024-11-07 PROCEDURE — 84100 ASSAY OF PHOSPHORUS: CPT | Performed by: EMERGENCY MEDICINE

## 2024-11-07 PROCEDURE — 81003 URINALYSIS AUTO W/O SCOPE: CPT | Performed by: EMERGENCY MEDICINE

## 2024-11-07 PROCEDURE — 80053 COMPREHEN METABOLIC PANEL: CPT | Performed by: EMERGENCY MEDICINE

## 2024-11-07 PROCEDURE — 93010 ELECTROCARDIOGRAM REPORT: CPT | Performed by: EMERGENCY MEDICINE

## 2024-11-07 RX ORDER — OLANZAPINE 10 MG/1
2.5 INJECTION, POWDER, LYOPHILIZED, FOR SOLUTION INTRAMUSCULAR ONCE
Status: COMPLETED | OUTPATIENT
Start: 2024-11-07 | End: 2024-11-07

## 2024-11-07 RX ORDER — THIAMINE HYDROCHLORIDE 100 MG/ML
250 INJECTION, SOLUTION INTRAMUSCULAR; INTRAVENOUS DAILY
Status: DISCONTINUED | OUTPATIENT
Start: 2024-11-10 | End: 2024-11-07

## 2024-11-07 RX ORDER — THIAMINE HYDROCHLORIDE 100 MG/ML
500 INJECTION, SOLUTION INTRAMUSCULAR; INTRAVENOUS 3 TIMES DAILY
Status: DISCONTINUED | OUTPATIENT
Start: 2024-11-07 | End: 2024-11-08 | Stop reason: HOSPADM

## 2024-11-07 RX ORDER — THIAMINE HYDROCHLORIDE 100 MG/ML
250 INJECTION, SOLUTION INTRAMUSCULAR; INTRAVENOUS DAILY
Status: DISCONTINUED | OUTPATIENT
Start: 2024-11-10 | End: 2024-11-08 | Stop reason: HOSPADM

## 2024-11-07 RX ORDER — THIAMINE HYDROCHLORIDE 100 MG/ML
500 INJECTION, SOLUTION INTRAMUSCULAR; INTRAVENOUS 3 TIMES DAILY
Status: DISCONTINUED | OUTPATIENT
Start: 2024-11-08 | End: 2024-11-07

## 2024-11-07 RX ADMIN — SODIUM CHLORIDE 1000 ML: 9 INJECTION, SOLUTION INTRAVENOUS at 22:43

## 2024-11-07 RX ADMIN — THIAMINE HYDROCHLORIDE 500 MG: 100 INJECTION, SOLUTION INTRAMUSCULAR; INTRAVENOUS at 22:43

## 2024-11-07 RX ADMIN — OLANZAPINE 2.5 MG: 10 INJECTION, POWDER, FOR SOLUTION INTRAMUSCULAR at 21:57

## 2024-11-07 ASSESSMENT — ACTIVITIES OF DAILY LIVING (ADL)
ADLS_ACUITY_SCORE: 0
ADLS_ACUITY_SCORE: 0

## 2024-11-07 ASSESSMENT — COLUMBIA-SUICIDE SEVERITY RATING SCALE - C-SSRS: IS THE PATIENT NOT ABLE TO COMPLETE C-SSRS: UNABLE TO VERBALIZE

## 2024-11-08 ENCOUNTER — HOSPITAL ENCOUNTER (INPATIENT)
Facility: CLINIC | Age: 44
LOS: 2 days | Discharge: HOME | End: 2024-11-10
Attending: PSYCHIATRY & NEUROLOGY | Admitting: PSYCHIATRY & NEUROLOGY
Payer: COMMERCIAL

## 2024-11-08 ENCOUNTER — TELEPHONE (OUTPATIENT)
Dept: BEHAVIORAL HEALTH | Facility: CLINIC | Age: 44
End: 2024-11-08
Payer: COMMERCIAL

## 2024-11-08 VITALS
DIASTOLIC BLOOD PRESSURE: 73 MMHG | SYSTOLIC BLOOD PRESSURE: 132 MMHG | RESPIRATION RATE: 18 BRPM | HEART RATE: 92 BPM | TEMPERATURE: 98.2 F | OXYGEN SATURATION: 96 %

## 2024-11-08 DIAGNOSIS — I10 BENIGN ESSENTIAL HYPERTENSION: Primary | ICD-10-CM

## 2024-11-08 DIAGNOSIS — F41.9 ANXIETY: ICD-10-CM

## 2024-11-08 PROBLEM — F10.939 ALCOHOL WITHDRAWAL (H): Status: ACTIVE | Noted: 2024-11-08

## 2024-11-08 LAB
EST. AVERAGE GLUCOSE BLD GHB EST-MCNC: 123 MG/DL
GGT SERPL-CCNC: 194 U/L (ref 8–61)
HBA1C MFR BLD: 5.9 %
MAGNESIUM SERPL-MCNC: 2.3 MG/DL (ref 1.7–2.3)
PHOSPHATE SERPL-MCNC: 4.5 MG/DL (ref 2.5–4.5)
TSH SERPL DL<=0.005 MIU/L-ACNC: 1.75 UIU/ML (ref 0.3–4.2)

## 2024-11-08 PROCEDURE — 250N000013 HC RX MED GY IP 250 OP 250 PS 637: Performed by: EMERGENCY MEDICINE

## 2024-11-08 PROCEDURE — 84443 ASSAY THYROID STIM HORMONE: CPT | Performed by: PSYCHIATRY & NEUROLOGY

## 2024-11-08 PROCEDURE — 250N000011 HC RX IP 250 OP 636: Mod: JW | Performed by: EMERGENCY MEDICINE

## 2024-11-08 PROCEDURE — 82977 ASSAY OF GGT: CPT | Performed by: PSYCHIATRY & NEUROLOGY

## 2024-11-08 PROCEDURE — 250N000012 HC RX MED GY IP 250 OP 636 PS 637: Performed by: EMERGENCY MEDICINE

## 2024-11-08 PROCEDURE — 250N000013 HC RX MED GY IP 250 OP 250 PS 637: Performed by: PSYCHIATRY & NEUROLOGY

## 2024-11-08 PROCEDURE — 128N000004 HC R&B CD ADULT

## 2024-11-08 PROCEDURE — HZ2ZZZZ DETOXIFICATION SERVICES FOR SUBSTANCE ABUSE TREATMENT: ICD-10-PCS | Performed by: PSYCHIATRY & NEUROLOGY

## 2024-11-08 PROCEDURE — 83036 HEMOGLOBIN GLYCOSYLATED A1C: CPT | Performed by: PSYCHIATRY & NEUROLOGY

## 2024-11-08 PROCEDURE — 36415 COLL VENOUS BLD VENIPUNCTURE: CPT | Performed by: PSYCHIATRY & NEUROLOGY

## 2024-11-08 RX ORDER — FOLIC ACID 1 MG/1
1 TABLET ORAL DAILY
Status: DISCONTINUED | OUTPATIENT
Start: 2024-11-08 | End: 2024-11-10 | Stop reason: HOSPADM

## 2024-11-08 RX ORDER — AMOXICILLIN 250 MG
1 CAPSULE ORAL 2 TIMES DAILY PRN
Status: DISCONTINUED | OUTPATIENT
Start: 2024-11-08 | End: 2024-11-10 | Stop reason: HOSPADM

## 2024-11-08 RX ORDER — FLUMAZENIL 0.1 MG/ML
0.2 INJECTION, SOLUTION INTRAVENOUS
Status: DISCONTINUED | OUTPATIENT
Start: 2024-11-08 | End: 2024-11-08 | Stop reason: HOSPADM

## 2024-11-08 RX ORDER — HALOPERIDOL 5 MG/ML
2.5-5 INJECTION INTRAMUSCULAR EVERY 6 HOURS PRN
Status: DISCONTINUED | OUTPATIENT
Start: 2024-11-08 | End: 2024-11-08 | Stop reason: HOSPADM

## 2024-11-08 RX ORDER — TRAZODONE HYDROCHLORIDE 50 MG/1
50 TABLET, FILM COATED ORAL
Status: DISCONTINUED | OUTPATIENT
Start: 2024-11-08 | End: 2024-11-10 | Stop reason: HOSPADM

## 2024-11-08 RX ORDER — LORAZEPAM 1 MG/1
1-2 TABLET ORAL EVERY 30 MIN PRN
Status: DISCONTINUED | OUTPATIENT
Start: 2024-11-08 | End: 2024-11-08 | Stop reason: HOSPADM

## 2024-11-08 RX ORDER — GABAPENTIN 300 MG/1
300 CAPSULE ORAL EVERY 8 HOURS
Status: DISCONTINUED | OUTPATIENT
Start: 2024-11-13 | End: 2024-11-08

## 2024-11-08 RX ORDER — GABAPENTIN 300 MG/1
600 CAPSULE ORAL EVERY 8 HOURS
Status: DISCONTINUED | OUTPATIENT
Start: 2024-11-11 | End: 2024-11-08

## 2024-11-08 RX ORDER — GABAPENTIN 100 MG/1
100 CAPSULE ORAL EVERY 8 HOURS
Status: DISCONTINUED | OUTPATIENT
Start: 2024-11-15 | End: 2024-11-08

## 2024-11-08 RX ORDER — MULTIPLE VITAMINS W/ MINERALS TAB 9MG-400MCG
1 TAB ORAL DAILY
Status: DISCONTINUED | OUTPATIENT
Start: 2024-11-08 | End: 2024-11-10 | Stop reason: HOSPADM

## 2024-11-08 RX ORDER — ACETAMINOPHEN 325 MG/1
650 TABLET ORAL EVERY 4 HOURS PRN
Status: DISCONTINUED | OUTPATIENT
Start: 2024-11-08 | End: 2024-11-10 | Stop reason: HOSPADM

## 2024-11-08 RX ORDER — LORAZEPAM 2 MG/ML
1-2 INJECTION INTRAMUSCULAR EVERY 30 MIN PRN
Status: DISCONTINUED | OUTPATIENT
Start: 2024-11-08 | End: 2024-11-08 | Stop reason: HOSPADM

## 2024-11-08 RX ORDER — MAGNESIUM HYDROXIDE/ALUMINUM HYDROXICE/SIMETHICONE 120; 1200; 1200 MG/30ML; MG/30ML; MG/30ML
30 SUSPENSION ORAL EVERY 4 HOURS PRN
Status: DISCONTINUED | OUTPATIENT
Start: 2024-11-08 | End: 2024-11-10 | Stop reason: HOSPADM

## 2024-11-08 RX ORDER — ATENOLOL 50 MG/1
50 TABLET ORAL DAILY PRN
Status: DISCONTINUED | OUTPATIENT
Start: 2024-11-08 | End: 2024-11-09

## 2024-11-08 RX ORDER — DIAZEPAM 5 MG/1
5-20 TABLET ORAL EVERY 30 MIN PRN
Status: DISCONTINUED | OUTPATIENT
Start: 2024-11-08 | End: 2024-11-10 | Stop reason: HOSPADM

## 2024-11-08 RX ORDER — OLANZAPINE 5 MG/1
5-10 TABLET, ORALLY DISINTEGRATING ORAL EVERY 6 HOURS PRN
Status: DISCONTINUED | OUTPATIENT
Start: 2024-11-08 | End: 2024-11-08 | Stop reason: HOSPADM

## 2024-11-08 RX ORDER — OLANZAPINE 10 MG/1
10 TABLET ORAL 3 TIMES DAILY PRN
Status: DISCONTINUED | OUTPATIENT
Start: 2024-11-08 | End: 2024-11-10 | Stop reason: HOSPADM

## 2024-11-08 RX ORDER — HYDROXYZINE HYDROCHLORIDE 25 MG/1
25 TABLET, FILM COATED ORAL EVERY 4 HOURS PRN
Status: DISCONTINUED | OUTPATIENT
Start: 2024-11-08 | End: 2024-11-10 | Stop reason: HOSPADM

## 2024-11-08 RX ORDER — CLONIDINE HYDROCHLORIDE 0.1 MG/1
0.1 TABLET ORAL EVERY 8 HOURS
Status: DISCONTINUED | OUTPATIENT
Start: 2024-11-08 | End: 2024-11-08 | Stop reason: HOSPADM

## 2024-11-08 RX ORDER — BUPRENORPHINE AND NALOXONE 8; 2 MG/1; MG/1
1 FILM, SOLUBLE BUCCAL; SUBLINGUAL DAILY
Status: DISCONTINUED | OUTPATIENT
Start: 2024-11-08 | End: 2024-11-08 | Stop reason: HOSPADM

## 2024-11-08 RX ORDER — BUPRENORPHINE HYDROCHLORIDE AND NALOXONE HYDROCHLORIDE DIHYDRATE 8; 2 MG/1; MG/1
1 TABLET SUBLINGUAL ONCE
Status: DISCONTINUED | OUTPATIENT
Start: 2024-11-08 | End: 2024-11-08 | Stop reason: ALTCHOICE

## 2024-11-08 RX ORDER — OLANZAPINE 10 MG/2ML
10 INJECTION, POWDER, FOR SOLUTION INTRAMUSCULAR 3 TIMES DAILY PRN
Status: DISCONTINUED | OUTPATIENT
Start: 2024-11-08 | End: 2024-11-10 | Stop reason: HOSPADM

## 2024-11-08 RX ORDER — GABAPENTIN 600 MG/1
1200 TABLET ORAL ONCE
Status: COMPLETED | OUTPATIENT
Start: 2024-11-08 | End: 2024-11-08

## 2024-11-08 RX ORDER — LORAZEPAM 0.5 MG/1
1-2 TABLET ORAL EVERY 6 HOURS PRN
Status: ON HOLD | COMMUNITY
Start: 2024-11-03 | End: 2024-11-10

## 2024-11-08 RX ORDER — ONDANSETRON 4 MG/1
1-2 TABLET, FILM COATED ORAL EVERY 4 HOURS PRN
Status: ON HOLD | COMMUNITY
Start: 2024-10-26 | End: 2024-11-10

## 2024-11-08 RX ORDER — OLANZAPINE 10 MG/1
2.5 INJECTION, POWDER, LYOPHILIZED, FOR SOLUTION INTRAMUSCULAR ONCE
Status: COMPLETED | OUTPATIENT
Start: 2024-11-08 | End: 2024-11-08

## 2024-11-08 RX ORDER — GABAPENTIN 300 MG/1
900 CAPSULE ORAL EVERY 8 HOURS
Status: DISCONTINUED | OUTPATIENT
Start: 2024-11-08 | End: 2024-11-08

## 2024-11-08 RX ADMIN — ACETAMINOPHEN 650 MG: 325 TABLET, FILM COATED ORAL at 20:14

## 2024-11-08 RX ADMIN — CLONIDINE HYDROCHLORIDE 0.1 MG: 0.1 TABLET ORAL at 10:03

## 2024-11-08 RX ADMIN — Medication 1 TABLET: at 16:07

## 2024-11-08 RX ADMIN — HYDROXYZINE HYDROCHLORIDE 25 MG: 25 TABLET, FILM COATED ORAL at 20:14

## 2024-11-08 RX ADMIN — LORAZEPAM 2 MG: 1 TABLET ORAL at 11:34

## 2024-11-08 RX ADMIN — BUPRENORPHINE AND NALOXONE 1 FILM: 8; 2 FILM BUCCAL; SUBLINGUAL at 11:09

## 2024-11-08 RX ADMIN — LORAZEPAM 1 MG: 1 TABLET ORAL at 10:51

## 2024-11-08 RX ADMIN — FOLIC ACID 1 MG: 1 TABLET ORAL at 16:07

## 2024-11-08 RX ADMIN — LORAZEPAM 2 MG: 1 TABLET ORAL at 13:55

## 2024-11-08 RX ADMIN — DIAZEPAM 10 MG: 5 TABLET ORAL at 16:07

## 2024-11-08 RX ADMIN — THIAMINE HCL TAB 100 MG 100 MG: 100 TAB at 16:07

## 2024-11-08 RX ADMIN — DIAZEPAM 10 MG: 5 TABLET ORAL at 20:14

## 2024-11-08 RX ADMIN — LORAZEPAM 1 MG: 1 TABLET ORAL at 10:03

## 2024-11-08 RX ADMIN — ATENOLOL 50 MG: 50 TABLET ORAL at 20:14

## 2024-11-08 RX ADMIN — OLANZAPINE 2.5 MG: 10 INJECTION, POWDER, FOR SOLUTION INTRAMUSCULAR at 00:35

## 2024-11-08 ASSESSMENT — LIFESTYLE VARIABLES
VISUAL DISTURBANCES: NOT PRESENT
NAUSEA AND VOMITING: MILD NAUSEA WITH NO VOMITING
AGITATION: 2
AUDITORY DISTURBANCES: NOT PRESENT
AGITATION: NORMAL ACTIVITY
ORIENTATION AND CLOUDING OF SENSORIUM: ORIENTED AND CAN DO SERIAL ADDITIONS
HEADACHE, FULLNESS IN HEAD: NOT PRESENT
NAUSEA AND VOMITING: MILD NAUSEA WITH NO VOMITING
TACTILE DISTURBANCES: VERY MILD ITCHING, PINS AND NEEDLES, BURNING OR NUMBNESS
HEADACHE, FULLNESS IN HEAD: VERY MILD
ORIENTATION AND CLOUDING OF SENSORIUM: ORIENTED AND CAN DO SERIAL ADDITIONS
NAUSEA AND VOMITING: MILD NAUSEA WITH NO VOMITING
TACTILE DISTURBANCES: MILD ITCHING, PINS AND NEEDLES, BURNING OR NUMBNESS
VISUAL DISTURBANCES: NOT PRESENT
PAROXYSMAL SWEATS: 3
ORIENTATION AND CLOUDING OF SENSORIUM: ORIENTED AND CAN DO SERIAL ADDITIONS
NAUSEA AND VOMITING: MILD NAUSEA WITH NO VOMITING
TACTILE DISTURBANCES: VERY MILD ITCHING, PINS AND NEEDLES, BURNING OR NUMBNESS
TOTAL SCORE: 6
AGITATION: NORMAL ACTIVITY
VISUAL DISTURBANCES: NOT PRESENT
ANXIETY: 2
TOTAL SCORE: 8
HEADACHE, FULLNESS IN HEAD: NOT PRESENT
VISUAL DISTURBANCES: NOT PRESENT
TACTILE DISTURBANCES: VERY MILD ITCHING, PINS AND NEEDLES, BURNING OR NUMBNESS
AUDITORY DISTURBANCES: NOT PRESENT
AGITATION: NORMAL ACTIVITY
SKIP TO QUESTIONS 9-10: 0
PAROXYSMAL SWEATS: BARELY PERCEPTIBLE SWEATING, PALMS MOIST
HEADACHE, FULLNESS IN HEAD: NOT PRESENT
TREMOR: MODERATE, WITH PATIENT'S ARMS EXTENDED
AUDITORY DISTURBANCES: NOT PRESENT
ORIENTATION AND CLOUDING OF SENSORIUM: ORIENTED AND CAN DO SERIAL ADDITIONS
ORIENTATION AND CLOUDING OF SENSORIUM: ORIENTED AND CAN DO SERIAL ADDITIONS
ANXIETY: NO ANXIETY, AT EASE
AGITATION: NORMAL ACTIVITY
HEADACHE, FULLNESS IN HEAD: MODERATE
AUDITORY DISTURBANCES: NOT PRESENT
TOTAL SCORE: 16
PAROXYSMAL SWEATS: NO SWEAT VISIBLE
TREMOR: MODERATE, WITH PATIENT'S ARMS EXTENDED
NAUSEA AND VOMITING: MILD NAUSEA WITH NO VOMITING
PAROXYSMAL SWEATS: BARELY PERCEPTIBLE SWEATING, PALMS MOIST
TACTILE DISTURBANCES: VERY MILD ITCHING, PINS AND NEEDLES, BURNING OR NUMBNESS
VISUAL DISTURBANCES: NOT PRESENT
TREMOR: 3
ANXIETY: 2
ANXIETY: MODERATELY ANXIOUS, OR GUARDED, SO ANXIETY IS INFERRED
TREMOR: NOT VISIBLE, BUT CAN BE FELT FINGERTIP TO FINGERTIP
AUDITORY DISTURBANCES: NOT PRESENT
TOTAL SCORE: 10
TOTAL SCORE: 9
ANXIETY: 3
PAROXYSMAL SWEATS: NO SWEAT VISIBLE
TREMOR: MODERATE, WITH PATIENT'S ARMS EXTENDED

## 2024-11-08 ASSESSMENT — ACTIVITIES OF DAILY LIVING (ADL)
ADLS_ACUITY_SCORE: 0

## 2024-11-08 NOTE — TELEPHONE ENCOUNTER
R: ACCEPTED TO UNIT 3A/CD/Sandra Chen 10:36 AM    MR [id number] accepted to 3A/detox Dr Alex     Intake to update work lists.     10:45a Indicia Completed.    10:47a Called Unit 3A BRIANNE Muro to inform pt is in queue for the unit. CRN informed they have requested additional staff for this shift with ANS, they will call ED for report if they are able to accommodate pt's admission for day shift.     10:51a Called [facility] Cruz to inform pt is in queue for 3A/MATI. 3A CRN informed they have requested additional staff for this shift with ANS, they will call ED for report if they are able to accommodate pt's admission for day shift.     10:52a  Intake notes all work lists updated with pt's acceptance.

## 2024-11-08 NOTE — PHARMACY-ADMISSION MEDICATION HISTORY
Please see Admission Medication History note completed on 11/7 under previous encounter for information regarding prior to admission medications.    Kemi Schilling, PharmD  *[phone]

## 2024-11-08 NOTE — MEDICATION SCRIBE - ADMISSION MEDICATION HISTORY
"Medication Scribe Admission Medication History    Admission medication history is complete. The information provided in this note is only as accurate as the sources available at the time of the update.    Information Source(s): Patient via in-person    Pertinent Information: Patient stopped taking Adderall \"a few weeks ago\" Currently only taking Suboxone 8-2 mg once daily    Changes made to PTA medication list:  Added: Lorazepam PRN, Ondansetron PRN  Deleted: Adderall 20 mg and Adderall 30 mg  Changed: None    Allergies reviewed with patient and updates made in EHR: yes    Medication History Completed By: Denise Dennis 11/8/2024 10:21 AM    PTA Med List   Medication Sig Last Dose/Taking    buprenorphine-naloxone (SUBOXONE) 8-2 MG SUBL sublingual tablet Place 1 tablet under the tongue daily. 11/7/2024 Morning    LORazepam (ATIVAN) 0.5 MG tablet Take 1-2 tablets by mouth every 6 hours as needed for anxiety. Unknown    ondansetron (ZOFRAN) 4 MG tablet Take 1-2 tablets by mouth every 4 hours as needed. Unknown     "

## 2024-11-08 NOTE — ED PROVIDER NOTES
Emergency Department Patient Sign-out       Brief HPI:  This is a 44 year old male signed out to me by .  See initial ED Provider note for details of the presentation.     Assessments & Plan (with Medical Decision Making)   44-year-old male presents for evaluation of altered mental status.  Believe is most likely related to alcohol intoxication.  He does not have overt signs of alcohol withdrawal at this time.  He is tachycardic.  He is given IV olanzapine for his symptoms.  EKG is sinus tachycardia without signs of ischemia.  Urine drug screen is positive for benzodiazepines which the patient has recently had prescribed.  Urinalysis is negative for blood or signs of infection.  Hemoglobin 13.9 and white blood cell count is 6.3.  Magnesium and electrolytes are within normal limits.  His serum ethanol level 0.38.  He is started on IV thiamine and fluids.  I will plan was to find the patient a detox bed for safe withdrawal from alcohol.  I am concerned given his quite elevated blood alcohol level here and that he is able to hold a conversation with me although it is clearly an clinically intoxicated he has very obviously been drinking large amounts of alcohol for a long period of time.  I am concerned that treating him for alcohol withdrawal at home is not safe and that he could start to have dangerous withdrawal.  I discussed this with the patient and his wife and the patient's wife is very much interested in the patient going to detox and would like him to receive that help.      Unfortunately the patient has not been convinced.  We have kept him in the emergency department overnight hoping that letting him metabolize some of the alcohol would get him into a place where he is more sober and able to make rational decisions.  Unfortunately in the morning he still does not want to go to detox.  He says he understands the risks and does not want further treatment at this time.  He says he wants to stop  drinking alcohol and we discussed the dangers of alcohol withdrawal.  The patient's wife has tried to convince him to go to detox and he is still has not wanted to go.  At this time he is clinically sober and is refusing further treatment.  He will be discharged with instructions to return at any time if he changes mind, otherwise follow-up in clinic.      Patient was assessed and I discussed the case with the wife.  CIWA score was 9 at this time and patient was medicated.  He has agreed at this time that he is willing to go to detox.  I discussed the case with [place] provider at [facility] and they are willing to accept the patient in transfer.      Exam:   Patient Vitals for the past 24 hrs:   BP Temp Temp src Pulse Resp SpO2   11/08/24 1052 (!) 145/97 -- -- 100 -- --   11/08/24 0734 107/59 -- -- 60 -- --   11/08/24 0527 125/75 -- -- 90 18 96 %   11/07/24 2200 -- -- -- -- 15 --   11/07/24 2120 (!) 158/117 98.2  F (36.8  C) Oral (!) 122 15 98 %           ED RESULTS:   Results for orders placed or performed during the hospital encounter of 11/07/24 (from the past 24 hours)   CBC with Platelets & Differential     Status: Abnormal    Collection Time: 11/07/24 10:01 PM    Narrative    The following orders were created for panel order CBC with Platelets & Differential.  Procedure                               Abnormality         Status                     ---------                               -----------         ------                     CBC with platelets and d...[[id number]]  Abnormal            Final result                 Please view results for these tests on the individual orders.   Comprehensive metabolic panel     Status: Abnormal    Collection Time: 11/07/24 10:01 PM   Result Value Ref Range    Sodium 144 135 - 145 mmol/L    Potassium 4.1 3.4 - 5.3 mmol/L    Carbon Dioxide (CO2) 27 22 - 29 mmol/L    Anion Gap 16 (H) 7 - 15 mmol/L    Urea Nitrogen 21.0 (H) 6.0 - 20.0 mg/dL    Creatinine 0.96 0.67 - 1.17 mg/dL     GFR Estimate >90 >60 mL/min/1.73m2    Calcium 9.3 8.8 - 10.4 mg/dL    Chloride 101 98 - 107 mmol/L    Glucose 120 (H) 70 - 99 mg/dL    Alkaline Phosphatase 92 40 - 150 U/L     (H) 0 - 45 U/L     (H) 0 - 70 U/L    Protein Total 7.5 6.4 - 8.3 g/dL    Albumin 4.8 3.5 - 5.2 g/dL    Bilirubin Total 0.2 <=1.2 mg/dL   Alcohol level blood     Status: Abnormal    Collection Time: 11/07/24 10:01 PM   Result Value Ref Range    Alcohol ethyl 0.38 (HH) <=0.01 g/dL   Troponin T, High Sensitivity     Status: Normal    Collection Time: 11/07/24 10:01 PM   Result Value Ref Range    Troponin T, High Sensitivity 11 <=22 ng/L   Magnesium     Status: Normal    Collection Time: 11/07/24 10:01 PM   Result Value Ref Range    Magnesium 2.2 1.7 - 2.3 mg/dL   CBC with platelets and differential     Status: Abnormal    Collection Time: 11/07/24 10:01 PM   Result Value Ref Range    WBC Count 6.3 4.0 - 11.0 10e3/uL    RBC Count 4.08 (L) 4.40 - 5.90 10e6/uL    Hemoglobin 13.9 13.3 - 17.7 g/dL    Hematocrit 39.2 (L) 40.0 - 53.0 %    MCV 96 78 - 100 fL    MCH 34.1 (H) 26.5 - 33.0 pg    MCHC 35.5 31.5 - 36.5 g/dL    RDW 13.1 10.0 - 15.0 %    Platelet Count 277 150 - 450 10e3/uL    % Neutrophils 60 %    % Lymphocytes 23 %    % Monocytes 12 %    % Eosinophils 3 %    % Basophils 2 %    % Immature Granulocytes 0 %    NRBCs per 100 WBC 0 <1 /100    Absolute Neutrophils 3.8 1.6 - 8.3 10e3/uL    Absolute Lymphocytes 1.4 0.8 - 5.3 10e3/uL    Absolute Monocytes 0.8 0.0 - 1.3 10e3/uL    Absolute Eosinophils 0.2 0.0 - 0.7 10e3/uL    Absolute Basophils 0.1 0.0 - 0.2 10e3/uL    Absolute Immature Granulocytes 0.0 <=0.4 10e3/uL    Absolute NRBCs 0.0 10e3/uL   Magnesium     Status: Normal    Collection Time: 11/07/24 10:01 PM   Result Value Ref Range    Magnesium 2.3 1.7 - 2.3 mg/dL   Phosphorus     Status: Normal    Collection Time: 11/07/24 10:01 PM   Result Value Ref Range    Phosphorus 4.5 2.5 - 4.5 mg/dL   Urine Drug Screen     Status: Abnormal     Collection Time: 11/07/24 11:34 PM    Narrative    The following orders were created for panel order Urine Drug Screen.  Procedure                               Abnormality         Status                     ---------                               -----------         ------                     Urine Drug Screen Panel[[id number]]      Abnormal            Final result                 Please view results for these tests on the individual orders.   Urine Macroscopic with reflex to Microscopic     Status: Normal    Collection Time: 11/07/24 11:34 PM   Result Value Ref Range    Color Urine Yellow Colorless, Straw, Light Yellow, Yellow    Appearance Urine Clear Clear    Glucose Urine Negative Negative mg/dL    Bilirubin Urine Negative Negative    Ketones Urine Negative Negative mg/dL    Specific Gravity Urine 1.006 1.003 - 1.035    Blood Urine Negative Negative    pH Urine 6.0 5.0 - 7.0    Protein Albumin Urine Negative Negative mg/dL    Urobilinogen Urine Normal Normal, 2.0 mg/dL    Nitrite Urine Negative Negative    Leukocyte Esterase Urine Negative Negative    Narrative    Microscopic not indicated   Urine Drug Screen Panel     Status: Abnormal    Collection Time: 11/07/24 11:34 PM   Result Value Ref Range    Amphetamines Urine Screen Negative Screen Negative    Barbituates Urine Screen Negative Screen Negative    Benzodiazepine Urine Screen Positive (A) Screen Negative    Cannabinoids Urine Screen Negative Screen Negative    Cocaine Urine Screen Negative Screen Negative    Fentanyl Qual Urine Screen Negative Screen Negative    Opiates Urine Screen Negative Screen Negative    PCP Urine Screen Negative Screen Negative       ED MEDICATIONS:   Medications   thiamine (B-1) injection 500 mg (500 mg Intravenous Not Given 11/8/24 1004)     Followed by   thiamine (B-1) injection 250 mg (has no administration in time range)     Followed by   thiamine (B-1) tablet 100 mg (has no administration in time range)   cloNIDine  (CATAPRES) tablet 0.1 mg (0.1 mg Oral $Given 11/8/24 1003)   OLANZapine zydis (zyPREXA) ODT tab 5-10 mg (has no administration in time range)     Or   haloperidol lactate (HALDOL) injection 2.5-5 mg (has no administration in time range)   flumazenil (ROMAZICON) injection 0.2 mg (has no administration in time range)   melatonin tablet 5 mg (has no administration in time range)   LORazepam (ATIVAN) tablet 1-2 mg (2 mg Oral $Given 11/8/24 1134)     Or   LORazepam (ATIVAN) injection 1-2 mg ( Intravenous See Alternative 11/8/24 1134)   buprenorphine HCl-naloxone HCl (SUBOXONE) 8-2 MG per film 1 Film (1 Film Sublingual $Given 11/8/24 1109)   OLANZapine (zyPREXA) IV injection 2.5 mg (2.5 mg Intravenous $Given 11/7/24 2157)   sodium chloride 0.9% BOLUS 1,000 mL (0 mLs Intravenous Stopped 11/8/24 0142)   OLANZapine (zyPREXA) IV injection 2.5 mg (2.5 mg Intravenous $Given 11/8/24 0035)   gabapentin (NEURONTIN) tablet 1,200 mg (1,200 mg Oral Not Given 11/8/24 1002)         Impression:    ICD-10-CM    1. Altered mental status, unspecified altered mental status type  R41.82       2. Alcoholic intoxication without complication (H)  F10.920       3. Alcohol use disorder  F10.90           Plan:    Transfer to Detox      MD Meredith Tirado David Charles, MD  11/09/24 9890

## 2024-11-08 NOTE — ED TRIAGE NOTES
BIBA from home, pts wife called 911 due to episodes of apnea per wife. No signs of apnea with medics, pt at 97% O2 on RA. Pt stated he drank 1.75 vodka tonight.  Pt on Suboxone   Hypertensive 180s

## 2024-11-08 NOTE — ED PROVIDER NOTES
History     Chief Complaint   Patient presents with    Alcohol Intoxication     HPI  Timmy Yousif is a 44 year old male with a history of opioid use disorder and alcohol use disorder who presents for altered mental status and agitation.  History is obtained from the patient and from his wife.  The patient has been struggling recently with alcohol use disorder has been trying to quit alcohol.  He was trying to manage this on his own at home and this is not going well.  Today he was feeling much worse, was diaphoretic, felt like his heart was racing, reports feeling extremely uncomfortable and then drink alcohol again to try to help with his symptoms.  He is remorseful of this and just wants to quit.  No vomiting.  He has been able to take his Suboxone without issue.  No abdominal pain.    Allergies:  Allergies   Allergen Reactions    Penicillin [Penicillins] Rash       Problem List:    Patient Active Problem List    Diagnosis Date Noted    Stimulant dependence (H) 07/20/2021     Priority: Medium    Attention deficit hyperactivity disorder (ADHD), combined type 12/03/2018     Priority: Medium    Opioid dependence in remission (H) 06/07/2018     Priority: Medium    Benign essential hypertension 02/16/2016     Priority: Medium    ELISHA (obstructive sleep apnea) 09/01/2015     Priority: Medium     Overview:   Mild, wears a mouth guard   Uses mouthpiece      Lumbar disc herniation with radiculopathy 01/21/2014     Priority: Medium     7/10/15-same day appointment, flare in back pain, desires long term follow up with Dr. Hensley,  needs narcotic pain contract at 7/13/15 follow up  appointment with Dr. Hensley  L5/S1.  In 2009 saw neurosurgery.  Occasional flares responding well to robaxin, steroids.  Bilateral L5-S1 transforaminal blocks helpful.      CARDIOVASCULAR SCREENING; LDL GOAL LESS THAN 160 10/31/2010     Priority: Medium        Past Medical History:    Past Medical History:   Diagnosis Date    Arthritis December  2009    Benign essential hypertension        Past Surgical History:    No past surgical history on file.    Family History:    Family History   Problem Relation Age of Onset    Cancer Father         bladder cancer    Other Cancer Father         Bladder    Diabetes Mother         Type 2-managed by diet       Social History:  Marital Status:   [2]  Social History     Tobacco Use    Smoking status: Never    Smokeless tobacco: Never   Vaping Use    Vaping status: Never Used   Substance Use Topics    Alcohol use: Yes     Comment: occ    Drug use: No        Medications:    amphetamine-dextroamphetamine (ADDERALL) 20 MG tablet  amphetamine-dextroamphetamine (ADDERALL) 30 MG tablet  buprenorphine-naloxone (SUBOXONE) 8-2 MG SUBL sublingual tablet          Review of Systems    Physical Exam   BP: (!) 158/117  Pulse: (!) 122  Temp: 98.2  F (36.8  C)  Resp: 15  SpO2: 98 %      Physical Exam  /75   Pulse 90   Temp 98.2  F (36.8  C) (Oral)   Resp 18   SpO2 96%    Gen: Tearful, upset but cooperative  Heent: Atraumatic. Nystagmus noted, eomi, perrla.   Neck: Supple, no meningismus.   Lungs: CTAB.   CVS: Regular, tachycardia   ADB: Soft, NT, ND.   Back: Atraumatic.   Pelvis: Stable, normal.   Extremities: No trauma, pulses intact, cap refill normal.   Neuro: Slurred speech, cerebellar impairment, limited ability to follow commands    ED Course        Procedures              EKG Interpretation:      Interpreted by Adama Ni MD  Time reviewed: 2155  Symptoms at time of EKG: altered mental status   Rhythm: sinus tachycardia  Rate: 112  Axis: Normal  Ectopy: none  Conduction: normal  ST Segments/ T Waves: No acute ischemic changes  Q Waves: none  Comparison to prior: No old EKG available    Clinical Impression: sinus tachycardia    Critical Care time:  none              Results for orders placed or performed during the hospital encounter of 11/07/24 (from the past 24 hours)   CBC with Platelets &  Differential    Narrative    The following orders were created for panel order CBC with Platelets & Differential.  Procedure                               Abnormality         Status                     ---------                               -----------         ------                     CBC with platelets and d...[[id number]]  Abnormal            Final result                 Please view results for these tests on the individual orders.   Comprehensive metabolic panel   Result Value Ref Range    Sodium 144 135 - 145 mmol/L    Potassium 4.1 3.4 - 5.3 mmol/L    Carbon Dioxide (CO2) 27 22 - 29 mmol/L    Anion Gap 16 (H) 7 - 15 mmol/L    Urea Nitrogen 21.0 (H) 6.0 - 20.0 mg/dL    Creatinine 0.96 0.67 - 1.17 mg/dL    GFR Estimate >90 >60 mL/min/1.73m2    Calcium 9.3 8.8 - 10.4 mg/dL    Chloride 101 98 - 107 mmol/L    Glucose 120 (H) 70 - 99 mg/dL    Alkaline Phosphatase 92 40 - 150 U/L     (H) 0 - 45 U/L     (H) 0 - 70 U/L    Protein Total 7.5 6.4 - 8.3 g/dL    Albumin 4.8 3.5 - 5.2 g/dL    Bilirubin Total 0.2 <=1.2 mg/dL   Alcohol level blood   Result Value Ref Range    Alcohol ethyl 0.38 (HH) <=0.01 g/dL   Troponin T, High Sensitivity   Result Value Ref Range    Troponin T, High Sensitivity 11 <=22 ng/L   Magnesium   Result Value Ref Range    Magnesium 2.2 1.7 - 2.3 mg/dL   CBC with platelets and differential   Result Value Ref Range    WBC Count 6.3 4.0 - 11.0 10e3/uL    RBC Count 4.08 (L) 4.40 - 5.90 10e6/uL    Hemoglobin 13.9 13.3 - 17.7 g/dL    Hematocrit 39.2 (L) 40.0 - 53.0 %    MCV 96 78 - 100 fL    MCH 34.1 (H) 26.5 - 33.0 pg    MCHC 35.5 31.5 - 36.5 g/dL    RDW 13.1 10.0 - 15.0 %    Platelet Count 277 150 - 450 10e3/uL    % Neutrophils 60 %    % Lymphocytes 23 %    % Monocytes 12 %    % Eosinophils 3 %    % Basophils 2 %    % Immature Granulocytes 0 %    NRBCs per 100 WBC 0 <1 /100    Absolute Neutrophils 3.8 1.6 - 8.3 10e3/uL    Absolute Lymphocytes 1.4 0.8 - 5.3 10e3/uL    Absolute Monocytes 0.8  0.0 - 1.3 10e3/uL    Absolute Eosinophils 0.2 0.0 - 0.7 10e3/uL    Absolute Basophils 0.1 0.0 - 0.2 10e3/uL    Absolute Immature Granulocytes 0.0 <=0.4 10e3/uL    Absolute NRBCs 0.0 10e3/uL   Urine Drug Screen    Narrative    The following orders were created for panel order Urine Drug Screen.  Procedure                               Abnormality         Status                     ---------                               -----------         ------                     Urine Drug Screen Panel[[id number]]      Abnormal            Final result                 Please view results for these tests on the individual orders.   Urine Macroscopic with reflex to Microscopic   Result Value Ref Range    Color Urine Yellow Colorless, Straw, Light Yellow, Yellow    Appearance Urine Clear Clear    Glucose Urine Negative Negative mg/dL    Bilirubin Urine Negative Negative    Ketones Urine Negative Negative mg/dL    Specific Gravity Urine 1.006 1.003 - 1.035    Blood Urine Negative Negative    pH Urine 6.0 5.0 - 7.0    Protein Albumin Urine Negative Negative mg/dL    Urobilinogen Urine Normal Normal, 2.0 mg/dL    Nitrite Urine Negative Negative    Leukocyte Esterase Urine Negative Negative    Narrative    Microscopic not indicated   Urine Drug Screen Panel   Result Value Ref Range    Amphetamines Urine Screen Negative Screen Negative    Barbituates Urine Screen Negative Screen Negative    Benzodiazepine Urine Screen Positive (A) Screen Negative    Cannabinoids Urine Screen Negative Screen Negative    Cocaine Urine Screen Negative Screen Negative    Fentanyl Qual Urine Screen Negative Screen Negative    Opiates Urine Screen Negative Screen Negative    PCP Urine Screen Negative Screen Negative       Medications   thiamine (B-1) injection 500 mg (500 mg Intravenous $Given 11/7/24 8878)     Followed by   thiamine (B-1) injection 250 mg (has no administration in time range)     Followed by   thiamine (B-1) tablet 100 mg (has no administration  in time range)   OLANZapine (zyPREXA) IV injection 2.5 mg (2.5 mg Intravenous $Given 11/7/24 2157)   sodium chloride 0.9% BOLUS 1,000 mL (0 mLs Intravenous Stopped 11/8/24 0142)   OLANZapine (zyPREXA) IV injection 2.5 mg (2.5 mg Intravenous $Given 11/8/24 0035)       Assessments & Plan (with Medical Decision Making)   44-year-old male presents for evaluation of altered mental status.  Believe is most likely related to alcohol intoxication.  He does not have overt signs of alcohol withdrawal at this time.  He is tachycardic.  He is given IV olanzapine for his symptoms.  EKG is sinus tachycardia without signs of ischemia.  Urine drug screen is positive for benzodiazepines which the patient has recently had prescribed.  Urinalysis is negative for blood or signs of infection.  Hemoglobin 13.9 and white blood cell count is 6.3.  Magnesium and electrolytes are within normal limits.  His serum ethanol level 0.38.  He is started on IV thiamine and fluids.  I will plan was to find the patient a detox bed for safe withdrawal from alcohol.  I am concerned given his quite elevated blood alcohol level here and that he is able to hold a conversation with me although it is clearly an clinically intoxicated he has very obviously been drinking large amounts of alcohol for a long period of time.  I am concerned that treating him for alcohol withdrawal at home is not safe and that he could start to have dangerous withdrawal.  I discussed this with the patient and his wife and the patient's wife is very much interested in the patient going to detox and would like him to receive that help.     Unfortunately the patient has not been convinced.  We have kept him in the emergency department overnight hoping that letting him metabolize some of the alcohol would get him into a place where he is more sober and able to make rational decisions.  Unfortunately in the morning he still does not want to go to detox.  He says he understands the  risks and does not want further treatment at this time.  He says he wants to stop drinking alcohol and we discussed the dangers of alcohol withdrawal.  The patient's wife has tried to convince him to go to detox and he is still has not wanted to go.  At this time he is clinically sober and is refusing further treatment.  He will be discharged with instructions to return at any time if he changes mind, otherwise follow-up in clinic.    I have reviewed the nursing notes.    I have reviewed the findings, diagnosis, plan and need for follow up with the patient.             New Prescriptions    No medications on file       Final diagnoses:   Altered mental status, unspecified altered mental status type   Alcoholic intoxication without complication (H)   Alcohol use disorder       11/7/2024   [facility] EMERGENCY DEPT       Adama Ni MD  11/08/24 2217

## 2024-11-08 NOTE — TELEPHONE ENCOUNTER
S: [facility] ED , Provider Meredith calling at 10:00 AM with clinical on a 44 year old/Male presenting for alcohol detox.     B: Pt presents for ETOH detox.   Currently reports drinking half liter of hard alcohol-vodka for quite some time- per pt's wife. Attempted to quit in the OP setting and was getting a little shaky and decided to drink.  Received zyprexa in ED  Patient reports last use was last night.  Pt HARPREET: 0.38  Pt  denies hx of DT  Pt  denies hx of seizures. Last seizure: N/A  Pt endorsing the following symptoms of withdrawal: Tremulous  MSSA Score: N/A CIWA-9    Pt denies acute mental health or medical concerns.   Pt endorses other drug use: (!) OTHER- opiates  Amount/frequency:  in the past    Does Pt have a detox care plan in [facility]? No  Does pt present with specific needs, assistive devices, or exclusionary criteria? None  Is the patient ambulating, eating and drinking in the ED? Yes    A: Pt meets criteria to be presented for IP detox admission. Patient is voluntary    COVID Symptoms: No  If yes, COVID test required   Utox: Positive for BENZO'S  Magnesium: WNL  CMP: Abnormalities: ANION GAP 16, UREA NITROGEN 21, GLUCOSE 120, ,   CBC: Abnormalities: RBC COUNT 4.08, HEMATOCRIT 39.2, MCH 34.1  HCG: N/A     R: Patient cleared and ready for behavioral bed placement: Yes    Pt is meeting criteria for presentation to 3A/CD    Does Patient need a Transfer Center request created? Yes, writer completed Transfer Center request at:  10:01 AM

## 2024-11-08 NOTE — PROGRESS NOTES
11/08/24 1533   Patient Belongings   Did you bring any home meds/supplements to the hospital?  No   Patient Belongings other (see comments)   Belongings Search Yes   Clothing Search Yes   Second Staff Nikolai     Storage  bin: backpack, shoes, belt, 2 hats, restricted clothing, Ziploc with candy, toothbrush, Visine    Box in med room:  phone,wallet,  contact lenses, dental devices    Security env # [id number]:  $3.00 cash, 4 visa cards    A             ADMISSION:    I am responsible for any personal items that are not sent to the safe or pharmacy. [facility] is not responsible for loss, theft or damage of any property in my possession.    Patient Signature _________________________________________ Date/Time _____________________    Staff Signature ___________________________________________ Date/Time _____________________    [facility] Staff person, if patient is unable/unwilling to sign    ________________________________________________________ Date/Time _____________________    DISCHARGE:    All personal items have been returned to me.    Patient Signature _________________________________________ Date/Time _____________________    Staff Signature ___________________________________________ Date/Time _____________________

## 2024-11-08 NOTE — DISCHARGE INSTRUCTIONS
Return to the emergency department anytime if you change your mind about wanting help detoxing from alcohol.  I believe alcohol is very negatively affecting your life and I strongly suggest you stop drinking.  Good luck on your journey to sobriety.

## 2024-11-09 PROCEDURE — 128N000004 HC R&B CD ADULT

## 2024-11-09 PROCEDURE — 99222 1ST HOSP IP/OBS MODERATE 55: CPT | Performed by: NURSE PRACTITIONER

## 2024-11-09 PROCEDURE — 99254 IP/OBS CNSLTJ NEW/EST MOD 60: CPT

## 2024-11-09 PROCEDURE — 250N000011 HC RX IP 250 OP 636: Performed by: PSYCHIATRY & NEUROLOGY

## 2024-11-09 PROCEDURE — 250N000013 HC RX MED GY IP 250 OP 250 PS 637: Performed by: NURSE PRACTITIONER

## 2024-11-09 PROCEDURE — 250N000013 HC RX MED GY IP 250 OP 250 PS 637: Performed by: PSYCHIATRY & NEUROLOGY

## 2024-11-09 PROCEDURE — 250N000013 HC RX MED GY IP 250 OP 250 PS 637

## 2024-11-09 RX ORDER — GABAPENTIN 300 MG/1
300 CAPSULE ORAL 3 TIMES DAILY PRN
Status: DISCONTINUED | OUTPATIENT
Start: 2024-11-09 | End: 2024-11-10 | Stop reason: HOSPADM

## 2024-11-09 RX ORDER — ONDANSETRON 4 MG/1
4 TABLET, ORALLY DISINTEGRATING ORAL EVERY 6 HOURS PRN
Status: DISCONTINUED | OUTPATIENT
Start: 2024-11-09 | End: 2024-11-10 | Stop reason: HOSPADM

## 2024-11-09 RX ORDER — BUPRENORPHINE HYDROCHLORIDE AND NALOXONE HYDROCHLORIDE DIHYDRATE 8; 2 MG/1; MG/1
1 TABLET SUBLINGUAL DAILY
Status: DISCONTINUED | OUTPATIENT
Start: 2024-11-09 | End: 2024-11-10 | Stop reason: HOSPADM

## 2024-11-09 RX ORDER — HYDRALAZINE HYDROCHLORIDE 10 MG/1
10 TABLET, FILM COATED ORAL 4 TIMES DAILY PRN
Status: DISCONTINUED | OUTPATIENT
Start: 2024-11-09 | End: 2024-11-10 | Stop reason: HOSPADM

## 2024-11-09 RX ADMIN — ONDANSETRON 4 MG: 4 TABLET, ORALLY DISINTEGRATING ORAL at 08:52

## 2024-11-09 RX ADMIN — DIAZEPAM 10 MG: 5 TABLET ORAL at 10:47

## 2024-11-09 RX ADMIN — ACETAMINOPHEN 650 MG: 325 TABLET, FILM COATED ORAL at 20:36

## 2024-11-09 RX ADMIN — Medication 1 TABLET: at 08:35

## 2024-11-09 RX ADMIN — DIAZEPAM 10 MG: 5 TABLET ORAL at 08:35

## 2024-11-09 RX ADMIN — DIAZEPAM 10 MG: 5 TABLET ORAL at 11:55

## 2024-11-09 RX ADMIN — ONDANSETRON 4 MG: 4 TABLET, ORALLY DISINTEGRATING ORAL at 17:20

## 2024-11-09 RX ADMIN — TRAZODONE HYDROCHLORIDE 50 MG: 50 TABLET ORAL at 20:36

## 2024-11-09 RX ADMIN — OLANZAPINE 10 MG: 10 TABLET, FILM COATED ORAL at 19:27

## 2024-11-09 RX ADMIN — HYDROXYZINE HYDROCHLORIDE 25 MG: 25 TABLET, FILM COATED ORAL at 17:15

## 2024-11-09 RX ADMIN — FOLIC ACID 1 MG: 1 TABLET ORAL at 08:35

## 2024-11-09 RX ADMIN — BUPRENORPHINE AND NALOXONE 1 TABLET: 8; 2 TABLET SUBLINGUAL at 08:52

## 2024-11-09 RX ADMIN — HYDROXYZINE HYDROCHLORIDE 25 MG: 25 TABLET, FILM COATED ORAL at 10:47

## 2024-11-09 RX ADMIN — HYDRALAZINE HYDROCHLORIDE 10 MG: 10 TABLET ORAL at 17:17

## 2024-11-09 RX ADMIN — DIAZEPAM 10 MG: 5 TABLET ORAL at 13:54

## 2024-11-09 RX ADMIN — DIAZEPAM 10 MG: 5 TABLET ORAL at 17:16

## 2024-11-09 RX ADMIN — THIAMINE HCL TAB 100 MG 100 MG: 100 TAB at 08:35

## 2024-11-09 ASSESSMENT — ACTIVITIES OF DAILY LIVING (ADL)
ADLS_ACUITY_SCORE: 0
ADLS_ACUITY_SCORE: 0
HYGIENE/GROOMING: INDEPENDENT
ADLS_ACUITY_SCORE: 0
ORAL_HYGIENE: INDEPENDENT
ADLS_ACUITY_SCORE: 0
LAUNDRY: WITH SUPERVISION
ADLS_ACUITY_SCORE: 0
HYGIENE/GROOMING: INDEPENDENT
ADLS_ACUITY_SCORE: 0
DRESS: INDEPENDENT

## 2024-11-09 NOTE — PLAN OF CARE
Problem: Alcohol Withdrawal  Goal: Alcohol Withdrawal Symptom Control  Outcome: Progressing     Problem: Sleep Disturbance  Goal: Adequate Sleep/Rest  Outcome: Progressing     Goal Outcome Evaluation:    Patient is monitored for alcohol withdrawal symptoms. Scored MSSA of 6 and 6, did not receive any PRN medications for withdrawal.  . Pt slept most of the shift. Breathing quiet and unlabored when sleeping. Pt had no c/o pain or discomfort during the HS. Appears to have slept 7 hours.

## 2024-11-09 NOTE — CONSULTS
"[facility]  Consult Note - Hospitalist Service  Date of Admission:  11/8/2024  Consult Requested by: Sandra Coley MD   Reason for Consult: Routine H&P while in detox       Assessment & Plan   Timmy Yousif is a 44 year old male admitted on 11/8/2024 for alcohol detox. He has a past medical history of opioid use disorder, alcohol use disorder, HTN, ELISHA , and lumbar disc herniation with radiculopathy.  Medicine has been consulted for H&P during detox.    Medicine will follow up blood pressure in the periphery.  If remains hypertensive after withdrawal, we did discuss starting amlodipine 5 mg, with the patient for which he is in agreement.     # Acute alcohol withdrawal:   # Alcohol use disorder:  # OUD on Suboxone:  # Elevated liver chemistries: Presents for alcohol intoxication withdrawal. Last drink just prior to admission. Drinks 1L of hard liquor daily \"since he was 14.\" No history of seizures or DT's. Blood alcohol level of 0.38.GGT of 194.Utox positive for benzodiazepines. Alk phos WNL.Total bili WNL. AST: ALT of 100: 145, in a reverse 2:1 pattern with alcohol use. MSSA score of 6-11 since arrival.  Presenting with diaphoresis, heart racing, tremors, anxiety. Overall, anxiety and tremor remain.   - Management per Psychiatry  - No need for BMP/hepatic panel unless clinically indicated   - Agree with MSSA protocol  - Agree with PTA Suboxone   - Agree with MVI, folate, thiamine     # HTN: Trending in 140s-190s/970s-100s.  No prior to admission medications.  Notes he is always hypertensive during alcohol withdrawal.   - PRN hydralazine 10 mg for SBP greater than 170, DBP greater than 110  - If remains hypertensive overnight despite resolving withdrawal we did decide to start amlodipine 5 mg and send him home with a blood pressure cuff.  - Always recheck BP if high or low and correlate with clinical situation  - Treat pain, anxiety, and any withdrawal s/s if " "present  - Notify provider if SBP >170 or DBP >110 after careful reassessment, treatment of pain/anxiety/withdrawal/home PTA med administration  - Trend     # ELISHA: Wears a mouthguard.  -Follow-up outpatient with PCP    # Lumbar disc herniation with radiculopathy: Has had injections outpatient as well as currently on Suboxone as above.  No acute issues at this time.  Declined any additional PRNs including Tylenol, ibuprofen, gels, patches.  - Suboxone as above  - Follow-up outpatient as needed with orthopedics and/or neurosurgery    # Pinched nerve of C3: Reports a pinched nerve with Sieg 3 with some ongoing numbness and tingling of the thumb and finger of the left hand.  No acute changes.  Again, declined any additional as needed's or scheduled medication  - Follow-up outpatient as needed with orthopedics and/or neurosurgery    # Prediabetes (A1c of 5.9 on 11/9/24):   - Lifestyle changes including alcohol cessation  - Recheck with PCP in 3 to 6 months    # HAGMA: 16 upon admission.  Likely due to EtOH usage and poor oral intake.  - Monitor as clinically indicated     The patient's care was discussed with the Bedside Nurse, Patient, and Primary team.    Clinically Significant Risk Factors Present on Admission                   # Hypertension: Noted on problem list           # Overweight: Estimated body mass index is 25.04 kg/m  as calculated from the following:    Height as of this encounter: 1.88 m (6' 2\").    Weight as of this encounter: 88.5 kg (195 lb).              [facility]  Hospitalist Service  Securely message with Vocera (more info)  Text page via [employer] Paging/Directory     This chart documentation was completed with Dragon voice-recognition software. Even though reviewed, this chart may still contain some grammatical, spelling, and word errors. Please contact the author for any questions or clarifications.     ______________________________________________________________________    Chief " "Complaint   Routine H&P while in detox     History is obtained from the patient and per chart review.     History of Present Illness   Timmy Yousif is a 44 year old male admitted on 11/8/2024 for alcohol detox. He has a past medical history of opioid use disorder, alcohol use disorder, HTN, ELISHA , and lumbar disc herniation with radiculopathy.  Medicine has been consulted for H&P during detox.    Presents for alcohol intoxication withdrawal. Last drink on prior to admission. Drinks 1L of hard liquor daily \"since he was 14.\" No history of seizures or DT's. Blood alcohol level of 0.38.GGT of 194.Utox positive for benzodiazepines. Alk phos WNL.Total bili WNL. AST: ALT of 100: 145, in a reverse 2:1 pattern with alcohol use. MSSA score of 6-11 since arrival.  Presenting with diaphoresis, heart racing, tremors, anxiety. Overall, anxiety and tremor remain.     Otherwise in no acute distress.  Reports elevated blood pressures when he is detoxing but otherwise it is normal when he is at home.  Reports a history of a pinched C3 nerve with some baseline chronic numbness and tingling.  Denies wanting additional PRNs for it.  Declined any outpatient referrals within the [facility] system for neurosurgery and/or orthopedics.  Denies any headaches, fevers, dysphagia, GERD s/s, SOB, chest pain, N/V, abdominal pain, dysuria, back pain or leg swelling.       Past Medical History    Past Medical History:   Diagnosis Date    Arthritis December 2009    Back    Benign essential hypertension        Past Surgical History   No past surgical history on file.    Medications   I have reviewed this patient's current medications  Medications Prior to Admission   Medication Sig Dispense Refill Last Dose/Taking    buprenorphine-naloxone (SUBOXONE) 8-2 MG SUBL sublingual tablet Place 1 tablet under the tongue daily.       LORazepam (ATIVAN) 0.5 MG tablet Take 1-2 tablets by mouth every 6 hours as needed for anxiety.       ondansetron (ZOFRAN) 4 MG " tablet Take 1-2 tablets by mouth every 4 hours as needed.             Review of Systems    The 10 point Review of Systems is negative other than noted in the HPI or here.     Social History   I have reviewed this patient's social history and updated it with pertinent information if needed.  Social History     Tobacco Use    Smoking status: Never    Smokeless tobacco: Never   Vaping Use    Vaping status: Never Used   Substance Use Topics    Alcohol use: Yes     Comment: occ    Drug use: No         Allergies   Allergies   Allergen Reactions    Penicillin [Penicillins] Rash        Physical Exam   Vital Signs: Temp: 97.2  F (36.2  C) Temp src: Temporal BP: (!) 164/112 Pulse: 95   Resp: 16 SpO2: 98 % O2 Device: None (Room air)    Weight: 195 lbs 0 oz    General Appearance: In NAD, sitting in bed  Respiratory: LS clear b/l, normal RR  Cardiovascular: S1, S2, no m/r/g  GI: BS+, all 4 quadrants, no masses, non-tender upon palpation  Skin: Intact on face, arms, legs. No wounds, bruising, or lesions noted.  Neurosurgery: A&Ox4, moving all extremities      Medical Decision Making       60 MINUTES SPENT BY ME on the date of service doing chart review, history, exam, documentation & further activities per the note.      Data     I have personally reviewed the following data over the past 24 hrs:    TSH: 1.75 T4: N/A A1C: 5.9 (H)       Imaging results reviewed over the past 24 hrs:   No results found for this or any previous visit (from the past 24 hours).

## 2024-11-09 NOTE — PROVIDER NOTIFICATION
11/09/24 0806   Vital Signs   Temp 97.2  F (36.2  C)   Temp src Temporal   Resp 16   Pulse 95   Pulse Rate Source Monitor   BP (!) 164/112   BP Location Left arm     IM, Josefina Lucia, notified of patient hypertension.     BP re-checked: 151/99

## 2024-11-09 NOTE — PLAN OF CARE
"Goal Outcome Evaluation:    Plan of Care Reviewed With: patient      Patient is up and visible in the milieu for breakfast and nurse check-ins. Patient is ambulating independently, balanced and steady. Patient is alert and oriented x 4. Patient is not attending/participating in unit programming. Pt is minimally social with peers when out in the lounge. Affect is blunted/flat, mood is anxious. Patient rates anxiety an 8/10 and depression a 1/10 on the 1-10 severity scale. Patient denies SI/SIB/HI. Pt denies auditory/visual hallucinations. Patient verbally contracts for safety on the unit. Patient signed MADI for communications with his wife if she calls.     This RN administered the PRN medications Zofran 4mg ODT x 1 for nausea and Hydroxyzine 25mg x 1 for anxiety, (see MAR) at the request of the patient. Patient is tolerating medications well, denies any current side effects. Patient reports the Zofran was minimally helpful.     Patient in a moderate amount of withdrawal and presents as very tremulous.     Patient MSSA's this shift:  11 @ (0835) - 10mg valium given x 1 (See MAR)  16 @ (1047) - 10mg valium given x 1 (See MAR)  11 @ (1155) - 10mg valium given x 1  (See MAR)  10 @ (1354) - 10mg valium given x 1 (See MAR)    On call provider, Darlin DARNELL, notified of patient high level of withdrawal.     Patient reports an \"okay\" appetite, and did not identify any issues with sleep. Patient discharge plans pending, but patient does state that he is interested in outpatient treatment. Rest, fluids, and food encouraged. Status 15 checks remain. Patient is able to make needs known appropriately. Patient denies any unmet needs at this time.     Blood pressure (!) 156/89, pulse 95, temperature 99.2  F (37.3  C), temperature source Oral, resp. rate 16, height 1.88 m (6' 2\"), weight 88.5 kg (195 lb), SpO2 98%.     Vital signs re-checked:  Blood pressure (!) 131/93, pulse 89, temperature 97.8  F (36.6  C), temperature " "source Oral, resp. rate 16, height 1.88 m (6' 2\"), weight 88.5 kg (195 lb), SpO2 99%.    "

## 2024-11-09 NOTE — PLAN OF CARE
Goal Outcome Evaluation:    Plan of Care Reviewed With: patient        3AW Admission Note    S = Situation:   Timmy Yousif is a 44 year old year old male with a chief complaint of Timmy Yousif is a 44 year old male with a history of opioid use disorder and alcohol use disorder who presents for altered mental status and agitation.  History is obtained from the patient and from his wife.  The patient has been struggling recently with alcohol use disorder has been trying to quit alcohol.  He was trying to manage this on his own at home and this is not going well.  Today he was feeling much worse, was diaphoretic, felt like his heart was racing, reports feeling extremely uncomfortable and then drink alcohol again to try to help with his symptoms.  He is remorseful of this and just wants to quit.  No vomiting.  He has been able to take his Suboxone without issue.  No abdominal pain.   Voluntary admission to 3A for alcohol withdrawal and detox.    B  = Background:   Substance use history: alcohol  Mental health history: ADHD  Medical history: Arthrititis  Legal history: Voluntary  Treatment history: Pt stated he had been drinking since age 14 but denied inpatient treatment.   Living situation: wife  Recent life stressors: been drinking since age 14.     A  =  Assessment:   During admission interview, pt affect was flat and labile.  MSSA 12 and 12. Pt admitted in the unit at the start of the shift. Pt cooperated with search and assessment. Pt endorsed high anxiety and depression and received valium for withdrawal. Pt denied all other mental health psych symptoms and contracted for safety. Pt oriented in the unit admission and he verbalized understanding.  and 106 at 1600 and 2000 respectively. Prn atenolol administered and rechecked at 2100 for 82 and IM Matt notified and no new order.  BP (!) 159/93 (BP Location: Right arm, Patient Position: Supine, Cuff Size: Adult Regular)   Pulse 118   Temp 98.2  F  "(36.8  C) (Oral)   Resp 20   Ht 1.88 m (6' 2\")   Wt 88.5 kg (195 lb)   SpO2 96%   BMI 25.04 kg/m       R =   Request or Recommendation:   Alcohol withdrawal will be monitored and treated using MSSA with valium per protocol.  Pt will meet with psychiatry, internal medicine, and case management tomorrow.  At the time of admission, pt reports discharge plan is go to treatment already arranged but cannot remember the name right now.             "

## 2024-11-09 NOTE — PLAN OF CARE
Behavioral Team Discussion: (11/9/2024)    Continued Stay Criteria/Rationale: Patient admitted for Chemical Use Issues.  Plan: The following services will be provided to the patient; psychiatric assessment, medication management, therapeutic milieu, individual and group support, and skills groups.   Participants: 3A Provider: Dr. Bruno Alex MD; 3A RN: Nancy Atkins RN; 3A CM's:  ALBERT Morgan .  Summary/Recommendation: Providers will assess today for treatment recommendations, discharge planning, and aftercare plans. CM will meet with pt for discharge planning.   Medical/Physical: pt reported he was diaphoretic, felt like his heart was racing, reports feeling extremely uncomfortable   Precautions:   Behavioral Orders   Procedures    Code 1 - Restrict to Unit    Routine Programming     As clinically indicated    Status 15     Every 15 minutes.    Withdrawal precautions     Rationale for change in precautions or plan: N/A  Progress: Initial.    ASAM Dimension Scale Ratings:  Dimension 1: 1 Client can tolerate and cope with withdrawal discomfort. The client displays mild to moderate intoxication or signs and symptoms interfering with daily functioning but does not immediately endanger self or others. Client poses minimal risk of severe withdrawal.  Dimension 2: 1 Client tolerates and jazmine with physical discomfort and is able to get the services that the client needs.  Dimension 3: 2 Client has difficulty with impulse control and lacks coping skills. Client has thoughts of suicide or harm to others without means; however, the thoughts may interfere with participation in some treatment activities. Client has difficulty functioning in significant life areas. Client has moderate symptoms of emotional, behavioral, or cognitive problems. Client is able to participate in most treatment activities.  Dimension 4: 2 Client displays verbal compliance, but lacks consistent behaviors; has low motivation for change; and  is passively involved in treatment.  Dimension 5: 4 No awareness of the negative impact of mental health problems or substance abuse. No coping skills to arrest mental health or addiction illnesses, or prevent relapse.  Dimension 6: 3 Client is not engaged in structured, meaningful activity and the client's peers, family, significant other, and living environment are unsupportive, or there is significant criminal justice system involvement.

## 2024-11-09 NOTE — H&P
"    Chief Complaint:   \"I couldn't stop drinking by myself\"        HPI:   Liban is a 44-year-old male admitted to station 3A for detoxification of alcohol.  Patient reports drinking approximately 1 L of hard alcohol daily for the past 6 months and prior to that approximately half a liter for years.  Patient presented to ED after an inability to manage alcohol withdrawal symptoms at home.  Patient states that he knows \"it is time\" to stop drinking.  Patient states that his historical opioid use disorder which is currently being treated with buprenorphine may have escalated his drinking patterns.  Patient reports initially using Percocet secondary to lumbar pathologies.  Denies other illicit substances.  Patient reporting significant anxiety stating that his body is \"always in the fight or flight mode\".  Patient is open to pharmacological interventions as well as outpatient CD treatment.    Chart review:   As per ED MD dated 11/9/24:   Timmy Yousif is a 44 year old male with a history of opioid use disorder and alcohol use disorder who presents for altered mental status and agitation.  History is obtained from the patient and from his wife.  The patient has been struggling recently with alcohol use disorder has been trying to quit alcohol.  He was trying to manage this on his own at home and this is not going well.  Today he was feeling much worse, was diaphoretic, felt like his heart was racing, reports feeling extremely uncomfortable and then drink alcohol again to try to help with his symptoms.  He is remorseful of this and just wants to quit.  No vomiting.  He has been able to take his Suboxone without issue.  No abdominal pain.         Past Psychiatric History:   ROSARIO  MDD by hx         Substance Use and History:   Patient reports he has been drinking a liter of hard alcohol per 24 hours for the last 6 months and states that prior to that he had been drinking approximately half a liter for years.  Patient states " "\"I've drank my entire life\".     Patient currently utilizes buprenorphine for OUD.  Tolerating current dose.  Denies use of other illicit substances          Past Medical History:   PAST MEDICAL HISTORY:   Past Medical History:   Diagnosis Date    Arthritis December 2009    Back    Benign essential hypertension        PAST SURGICAL HISTORY: No past surgical history on file.        Family History:   FAMILY HISTORY:   Family History   Problem Relation Age of Onset    Cancer Father         bladder cancer    Other Cancer Father         Bladder    Diabetes Mother         Type 2-managed by diet           Social History:    with 3 children under the age of 18.  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    SOCIAL HISTORY:   Social History     Tobacco Use    Smoking status: Never    Smokeless tobacco: Never   Substance Use Topics    Alcohol use: Yes     Comment: occ          PTA Medications:     Medications Prior to Admission   Medication Sig Dispense Refill Last Dose/Taking    buprenorphine-naloxone (SUBOXONE) 8-2 MG SUBL sublingual tablet Place 1 tablet under the tongue daily.       LORazepam (ATIVAN) 0.5 MG tablet Take 1-2 tablets by mouth every 6 hours as needed for anxiety.       ondansetron (ZOFRAN) 4 MG tablet Take 1-2 tablets by mouth every 4 hours as needed.               Current Medications:     Current Facility-Administered Medications   Medication Dose Route Frequency Provider Last Rate Last Admin    folic acid (FOLVITE) tablet 1 mg  1 mg Oral Daily Sandra Coley MD   1 mg at 11/08/24 1607    multivitamin w/minerals (THERA-VIT-M) tablet 1 tablet  1 tablet Oral Daily Sandra Coley MD   1 tablet at 11/08/24 1607    thiamine (B-1) tablet 100 mg  100 mg Oral Daily Sandra Coley MD   100 mg at 11/08/24 1607     Current Facility-Administered Medications   Medication Dose Route Frequency Provider Last Rate Last Admin    acetaminophen (TYLENOL) tablet " 650 mg  650 mg Oral Q4H PRN Sandra Coley MD   650 mg at 11/08/24 2014    alum & mag hydroxide-simethicone (MAALOX) suspension 30 mL  30 mL Oral Q4H PRN Sandra Coley MD        atenolol (TENORMIN) tablet 50 mg  50 mg Oral Daily PRN Sandra Coley MD   50 mg at 11/08/24 2014    diazepam (VALIUM) tablet 5-20 mg  5-20 mg Oral Q30 Min PRN Sandra Coley MD   10 mg at 11/08/24 2014    hydrOXYzine HCl (ATARAX) tablet 25 mg  25 mg Oral Q4H PRSandra Díaz MD   25 mg at 11/08/24 2014    OLANZapine (zyPREXA) tablet 10 mg  10 mg Oral TID PRSandra Díaz MD        Or    OLANZapine (zyPREXA) injection 10 mg  10 mg Intramuscular TID PRSandra Díaz MD        senna-docusate (SENOKOT-S/PERICOLACE) 8.6-50 MG per tablet 1 tablet  1 tablet Oral BID PRSandra Díaz MD        traZODone (DESYREL) tablet 50 mg  50 mg Oral At Bedtime PRSandra Díaz MD                Allergies:     Allergies   Allergen Reactions    Penicillin [Penicillins] Rash          Labs:     Recent Results (from the past 48 hours)   Comprehensive metabolic panel    Collection Time: 11/07/24 10:01 PM   Result Value Ref Range    Sodium 144 135 - 145 mmol/L    Potassium 4.1 3.4 - 5.3 mmol/L    Carbon Dioxide (CO2) 27 22 - 29 mmol/L    Anion Gap 16 (H) 7 - 15 mmol/L    Urea Nitrogen 21.0 (H) 6.0 - 20.0 mg/dL    Creatinine 0.96 0.67 - 1.17 mg/dL    GFR Estimate >90 >60 mL/min/1.73m2    Calcium 9.3 8.8 - 10.4 mg/dL    Chloride 101 98 - 107 mmol/L    Glucose 120 (H) 70 - 99 mg/dL    Alkaline Phosphatase 92 40 - 150 U/L     (H) 0 - 45 U/L     (H) 0 - 70 U/L    Protein Total 7.5 6.4 - 8.3 g/dL    Albumin 4.8 3.5 - 5.2 g/dL    Bilirubin Total 0.2 <=1.2 mg/dL   Alcohol level blood    Collection Time: 11/07/24 10:01 PM   Result Value Ref Range    Alcohol ethyl 0.38 (HH) <=0.01 g/dL   Troponin T, High Sensitivity    Collection Time: 11/07/24 10:01 PM   Result Value Ref Range    Troponin T, High  Sensitivity 11 <=22 ng/L   Magnesium    Collection Time: 11/07/24 10:01 PM   Result Value Ref Range    Magnesium 2.2 1.7 - 2.3 mg/dL   CBC with platelets and differential    Collection Time: 11/07/24 10:01 PM   Result Value Ref Range    WBC Count 6.3 4.0 - 11.0 10e3/uL    RBC Count 4.08 (L) 4.40 - 5.90 10e6/uL    Hemoglobin 13.9 13.3 - 17.7 g/dL    Hematocrit 39.2 (L) 40.0 - 53.0 %    MCV 96 78 - 100 fL    MCH 34.1 (H) 26.5 - 33.0 pg    MCHC 35.5 31.5 - 36.5 g/dL    RDW 13.1 10.0 - 15.0 %    Platelet Count 277 150 - 450 10e3/uL    % Neutrophils 60 %    % Lymphocytes 23 %    % Monocytes 12 %    % Eosinophils 3 %    % Basophils 2 %    % Immature Granulocytes 0 %    NRBCs per 100 WBC 0 <1 /100    Absolute Neutrophils 3.8 1.6 - 8.3 10e3/uL    Absolute Lymphocytes 1.4 0.8 - 5.3 10e3/uL    Absolute Monocytes 0.8 0.0 - 1.3 10e3/uL    Absolute Eosinophils 0.2 0.0 - 0.7 10e3/uL    Absolute Basophils 0.1 0.0 - 0.2 10e3/uL    Absolute Immature Granulocytes 0.0 <=0.4 10e3/uL    Absolute NRBCs 0.0 10e3/uL   Magnesium    Collection Time: 11/07/24 10:01 PM   Result Value Ref Range    Magnesium 2.3 1.7 - 2.3 mg/dL   Phosphorus    Collection Time: 11/07/24 10:01 PM   Result Value Ref Range    Phosphorus 4.5 2.5 - 4.5 mg/dL   Urine Macroscopic with reflex to Microscopic    Collection Time: 11/07/24 11:34 PM   Result Value Ref Range    Color Urine Yellow Colorless, Straw, Light Yellow, Yellow    Appearance Urine Clear Clear    Glucose Urine Negative Negative mg/dL    Bilirubin Urine Negative Negative    Ketones Urine Negative Negative mg/dL    Specific Gravity Urine 1.006 1.003 - 1.035    Blood Urine Negative Negative    pH Urine 6.0 5.0 - 7.0    Protein Albumin Urine Negative Negative mg/dL    Urobilinogen Urine Normal Normal, 2.0 mg/dL    Nitrite Urine Negative Negative    Leukocyte Esterase Urine Negative Negative   Urine Drug Screen Panel    Collection Time: 11/07/24 11:34 PM   Result Value Ref Range    Amphetamines Urine Screen  "Negative Screen Negative    Barbituates Urine Screen Negative Screen Negative    Benzodiazepine Urine Screen Positive (A) Screen Negative    Cannabinoids Urine Screen Negative Screen Negative    Cocaine Urine Screen Negative Screen Negative    Fentanyl Qual Urine Screen Negative Screen Negative    Opiates Urine Screen Negative Screen Negative    PCP Urine Screen Negative Screen Negative   GGT    Collection Time: 11/08/24  6:23 PM   Result Value Ref Range     (H) 8 - 61 U/L   TSH with free T4 reflex    Collection Time: 11/08/24  6:23 PM   Result Value Ref Range    TSH 1.75 0.30 - 4.20 uIU/mL   Hemoglobin A1c    Collection Time: 11/08/24  6:23 PM   Result Value Ref Range    Estimated Average Glucose 123 (H) <117 mg/dL    Hemoglobin A1C 5.9 (H) <5.7 %          Physical Exam:     BP (!) 164/112 (BP Location: Left arm)   Pulse 95   Temp 97.2  F (36.2  C) (Temporal)   Resp 16   Ht 1.88 m (6' 2\")   Wt 88.5 kg (195 lb)   SpO2 98%   BMI 25.04 kg/m    Weight is 195 lbs 0 oz  Body mass index is 25.04 kg/m .    Physical Exam as per ED dated 11/7/24:   Gen: Tearful, upset but cooperative  Heent: Atraumatic. Nystagmus noted, eomi, perrla.   Neck: Supple, no meningismus.   Lungs: CTAB.   CVS: Regular, tachycardia   ADB: Soft, NT, ND.   Back: Atraumatic.   Pelvis: Stable, normal.   Extremities: No trauma, pulses intact, cap refill normal.   Neuro: Slurred speech, cerebellar impairment, limited ability to follow commands         EKG Interpretation:       Interpreted by Adama Ni MD  Time reviewed: 2155 on 11/7/24  Symptoms at time of EKG: altered mental status   Rhythm: sinus tachycardia  Rate: 112  Axis: Normal  Ectopy: none  Conduction: normal  ST Segments/ T Waves: No acute ischemic changes  Q Waves: none  Comparison to prior: No old EKG available     Clinical Impression: sinus tachycardia    Physical Exam:  Gen: Anxious, cooperative  HEENT: EOMI, no nystagmus or scleral icterus, moist mucous " membranes  Skin:  + diaphoresis   Resp: No SOB, no cough   CV: No chest pain, no edema  Abd: No pain  Ext: No cyanosis, clubbing or edema  Neuro: No abnormal movements, no focal deficits  Psychiatric: anxious  Substance Withdrawal: + etoh withdrawal; has been receiving valium per protocol         Physical ROS:   The patient endorsed no new physical compliants. The remainder of 10-point review of systems was negative except as noted in HPI.    Reports chronic shoulder pain with reported nerve impingement.  Reports intermittent paresthesias.         Mental Status Exam:     Appearance: awake, alert and casually dressed  Attitude:  cooperative  Eye Contact:  fair  Mood:  anxious  Affect:  mood congruent and full range  Speech:  clear, coherent  Psychomotor Behavior:  tremor observed   Muscle strength and tone: WNL  Thought Process:  logical, linear, and goal oriented  Associations:  no loose associations  Thought Content:  no evidence of suicidal ideation or homicidal ideation and no evidence of psychotic thought  Insight:  fair  Judgement:  fair  Oriented to:  time, person, and place  Attention Span and Concentration:  intact  Recent and Remote Memory:  intact              Admission Diagnoses:     Alcohol Use Disorder, severe  ROSARIO   Opioid Use Disorder, severe, in remission  Use of MAT: Buprenorphine  Stimulant Use Disorder by hx  ADHD by hx  MDD by hx    ELISHA by hx  Benign essential hypertension by hx  Lumbar disc herniation with radiculopathy  by hx    Patient Active Problem List    Diagnosis Date Noted    Alcohol withdrawal (H) 11/08/2024     Priority: Medium    Stimulant dependence (H) 07/20/2021     Priority: Medium    Attention deficit hyperactivity disorder (ADHD), combined type 12/03/2018     Priority: Medium    Opioid dependence in remission (H) 06/07/2018     Priority: Medium    Benign essential hypertension 02/16/2016     Priority: Medium    ELISHA (obstructive sleep apnea) 09/01/2015     Priority: Medium      "Overview:   Mild, wears a mouth guard   Uses mouthpiece      Lumbar disc herniation with radiculopathy 01/21/2014     Priority: Medium     7/10/15-same day appointment, flare in back pain, desires long term follow up with Dr. Hensley,  needs narcotic pain contract at 7/13/15 follow up  appointment with Dr. Hensley  L5/S1.  In 2009 saw neurosurgery.  Occasional flares responding well to robaxin, steroids.  Bilateral L5-S1 transforaminal blocks helpful.      CARDIOVASCULAR SCREENING; LDL GOAL LESS THAN 160 10/31/2010     Priority: Medium              Assessment:   Upon exam today, patient presents as anxious and visibly experiencing alcohol withdrawal.  Patient cooperative with assessment and states he understands that he needs assistance in order to complete detoxification as well as abstain from alcohol.  Patient currently utilizing buprenorphine for history of opioid use disorder secondary to chronic use of Percocet for lumbar pain.  Patient denies all other illicit substances.  Patient is able to offer insight into his need for assistance to complete detoxification as well as to maintain sobriety.  Patient reports historical diagnosis of depression however patient does not feel that he has been symptomatic for depression however acknowledges significant anxiety.  Patient stated, \"I am always in fight or flight mode\".  Patient denies history of trauma. Patient would like to initiate treatment with SSRI however will hold off until withdrawal is further controlled.  Patient requested anxiolytic in the interim.  Patient would like to review literature for MAT.  Patient goal and future oriented and is able to list many protective factors.  Denies historical/current suicidal ideation or self injury.  Patient denies historical/current symptoms of psychosis.          Plan:     Ongoing education given regarding diagnostic and treatment options with risks, benefits and alternatives and adequate verbalization of " understanding.    Legal: Voluntary    Substance Withdrawal:   - MSSA with symptom driven Valium for alcohol withdrawal    Psychiatric Interventions:   Medication: PTA medications reviewed  11/9/24:   - Continue buprenorphine-naloxone 8-2 mg sublingual daily  - Initiate Neurontin 300 mg 3 times daily as needed for anxiety and neuropathic pain  - Patient would like to initiate Lexapro however will hold off until withdrawal is further controlled  - Would like to review literature re: naltrexone versus acamprosate    Precautions: Withdrawal  Observations: q 15  Labs/Imaging: no new orders    Medical Interventions:  -Folate, MVI, thiamine per protocol    Consults: Hospitalist per protocol for detox admission    Multidisciplinary Interventions:  to gather collateral information, coordinate care with outpatient providers and begin follow up/discharge planning.   - Would like to attend outpatient CD tx.   - Will need referral for outpatient psychiatric clinician in the [facility]    Disposition: Outpatient dependency treatment      More than 60 minutes spent on this visit with more than 50% time spent on coordination of care with staff, reviewing medical record, psychoeducation, providing supportive therapy regarding coping with chronic mental illness, entering orders and preparing documentation for the visit.     CARIE Song CNP    Initial Certification I certify that the inpatient psychiatric facility admission was medically necessary for treatment which could reasonably be expected to improve the patient s condition.        I estimate 4-5 days of hospitalization is necessary for proper treatment of the patient. My plans for post-hospital care this patient are outpatient CD tx.      CARIE Song CNP     -     11/9/2024     -

## 2024-11-09 NOTE — DISCHARGE INSTRUCTIONS
Behavioral Discharge Planning and Instructions  THANK YOU FOR CHOOSING [address]  [phone]    Summary: You were admitted to [facility] on 11/08/24 for detoxification from alcohol.  A medical exam was performed that included lab work. You have met with a  and opted to attend intensive outpatient programming.  Please take care and make your recovery a daily priority, Timmy!  It was a pleasure working with you and the entire treatment team here wishes you the very best in your recovery!     Recommendation:  Please attend your scheduled intake at [facility] on 11/11/24 at 8 am and follow any and all recommendations.   [facility] [employer]  Main phone: [phone]  Direct Dial: [phone]  Admissions email: [email or web address]    Main Diagnoses:  Per Dr. Bruno Alex MD;  Alcohol Use Disorder, severe  ROSARIO   Opioid Use Disorder, severe, in remission  Use of MAT: Buprenorphine  Stimulant Use Disorder by hx  ADHD by hx  MDD by hx     ELISHA by hx  Benign essential hypertension by hx  Lumbar disc herniation with radiculopathy  by hx    303.90 Alcohol Use Disorder, severe     Major Treatments, Procedures and Findings:  You were treated for alcohol detoxification using Valium. As an outpatient you will be prescribed Neurontin and atarax for anxiety, please take this medication as prescribed until it is gone. You have met with a  to develop a treatment plan for discharge. You had labs drawn and those results were reviewed with you. Please take a copy of your lab work with you to your next primary care provider appointment.     PLEASE discuss options for starting an selective serotonin reuptake inhibitor for anxiety as well as medication assisted treatment for alcohol use disorder such as naltrexone or campral.     Symptoms to Report:  If you experience more anxiety, confusion, sleeplessness, deep sadness or thoughts of suicide, notify your treatment  team or notify your primary care provider. IF ANY OF THE SYMPTOMS YOU ARE EXPERIENCING ARE A MEDICAL EMERGENCY CALL 911 IMMEDIATELY.     Lifestyle Adjustment: Adjust your lifestyle to get enough sleep, relaxation, exercise and good nutrition. Continue to develop healthy coping skills to decrease stress and promote a sober living environment. Do not use mood altering substances including alcohol, illegal drugs or addictive medications other than what is currently prescribed.     Disposition: Return home and attend virtual [facility].    Facts about COVID19 at www.cdc.gov/COVID19 and www.[place].gov/covid19    Keeping hands clean is one of the most important steps we can take to avoid getting sick and spreading germs to others.  Please wash your hands frequently and lather with soap for at least 20 seconds!    Follow-up Appointment:   You are aware you should make a follow up appointment with your primary care doctor for medical and medication management     Recovery apps for your phone for educational purposes and to locate in person and zoom recovery meetings  Everything AA -  chandler is a great resource  12 Step Toolkit - educational purpose learning about the 12 steps to recovery  [place] - meeting chandler  AA  - meeting chandler  Meeting guide - meeting chandler  Quick NA meeting - meeting chandler  Poonam- has various apps    Resources:  AA/NA meetings have returned to in-person or a hybrid combination of zoom/in-person therefore please check online to verify time.0  Need Support Now? If you or someone you know is struggling or in crisis, help is available. Call or text 992 or chat 98Home Delivery Service (HDS)line.org  AA meetings search for them at: https://aa-intergroup.org (worldwide meeting listings)  AA meetings for [place] area can be found online at: https://a[place].org (click local online meetings listings)  NA meetings for [place] area can be found online at: https://www.nam[place].org  (click find a meeting)  AA and NA  "Sponsors are excellent resources for support and you can find one at any support group meeting.   Alcoholics Anonymous (https://aa.org/): for information 24 hours/day  AA Intergroup service office in [place] (http://www.aastpaul.org/) [phone]  AA Intergroup service office in [facility]: [phone]. (http://www.a[place].org/)  Narcotics Anonymous (www.nam[place].org) (214) 212-6908  https://aafairviewriverside.org/meetings  SMART Recovery - self management for addiction recovery:  www.smartrecovery.org  Pathways ~ A Health Crisis Resource & Support Center:  631.207.6807.  https://prescribetoprevent.org/patient-education/videos/  http://www.RTB-Mediareduction.org  Crisis Text Line  Text 250920  You will be connected with a trained live crisis counselor to provide support. Por espanol, texto  KRAIG a 921309 o texto a 442-AYUDAME en WhatsApp  [place] Hope Line  1800.SUICIDE [7362428]  [facility] [phone]  Support Group:  AA/NA and Sponsor/support.  Fast Tracker  Linking people to mental health and substance use disorder resources  Education Elements.org   [place] Mental [facility] Warm Line  Peer to peer support  Monday thru Saturday, 12 pm to 10 pm  103.144.3020 or 1.908.875.3669  Text \"Support\" to 82701  National [place] on Mental Illness (MATI)  767.178.4225 or 1.888.MATI.HELPS  Alcoholics Anonymous (www.alcoholics-anonymous.org): Check your phone book for your local chapter.  Suicide Awareness Voices of Education (SAVE) (www.save.org): 348-309-IQTU (7240)  National Suicide Prevention Line (www.mentalhealthmn.org): 100-225-YFBM (5691)  Mental Health Consumer/Survivor Network of [place] (www.mhcsn.net): 715.712.4429 or 158-005-8109  Mental Health Association of [place] (www.mentalhealth.org): 106.520.3650 or 822-860-0201   Substance Abuse and Mental Health Services (www.sama.gov)  [place] Opioid Prevention Coalition: www.opioidcoalition.org    [place] Recovery Connection " ([facility])  [facility] connects people seeking recovery to resources that help foster and sustain long-term recovery.  Whether you are seeking resources for treatment, transportation, housing, job training, education, health care or other pathways to recovery, [facility] is a great place to start.  896.694.6397.  www.[facility]Data[facility]red.GKN - GloboKasNet    Great Pod casts for nutrition and wellness  Listen on Apple Podcasts  Dishing Up Nutrition   [employer]   Nutrition       Understand the connection between what you eat and how you feel. Hosted by licensed nutritionists and dietitians from [employer] we share practical, real-life solutions for healthier living through nutrition.     General Medication Instructions:   See your medication sheet(s) for instructions.   Take all medications as prescribed.  Make no changes unless your primary care provider suggests them.   Go to all your primary care provider visits.  Be sure to have all your required lab tests. This way, your medicines can be refilled on time.  Do not use any forms of alcohol.    Please Note: If you have any questions at anytime after you discharged please call [facility] detox unit 3A at [phone].    Here are a list of additional numbers you can call if you are wanting to resume services through [facility]:  [facility] Assessment Intake: [phone]  [facility] Adult BRENT Intensive Outpatient  call: [phone]  [facility] Admissions [phone]    Recovery Clinic call: [phone]  [facility]: [phone]  Medical Records call: [phone]  Billing Department call: [phone]    Please remember to take all of your behavioral discharge planning and lab paperwork to any follow up appointments, it contains your lab results, diagnosis, medication list and discharge recommendations.      THANK YOU FOR CHOOSING [facility]

## 2024-11-10 VITALS
RESPIRATION RATE: 16 BRPM | OXYGEN SATURATION: 98 % | BODY MASS INDEX: 25.03 KG/M2 | HEART RATE: 103 BPM | SYSTOLIC BLOOD PRESSURE: 145 MMHG | WEIGHT: 195 LBS | HEIGHT: 74 IN | TEMPERATURE: 98.4 F | DIASTOLIC BLOOD PRESSURE: 92 MMHG

## 2024-11-10 PROCEDURE — 250N000013 HC RX MED GY IP 250 OP 250 PS 637: Performed by: PSYCHIATRY & NEUROLOGY

## 2024-11-10 PROCEDURE — 99239 HOSP IP/OBS DSCHRG MGMT >30: CPT | Performed by: NURSE PRACTITIONER

## 2024-11-10 PROCEDURE — 250N000013 HC RX MED GY IP 250 OP 250 PS 637

## 2024-11-10 PROCEDURE — 250N000013 HC RX MED GY IP 250 OP 250 PS 637: Performed by: NURSE PRACTITIONER

## 2024-11-10 PROCEDURE — 99231 SBSQ HOSP IP/OBS SF/LOW 25: CPT

## 2024-11-10 RX ORDER — GABAPENTIN 300 MG/1
300 CAPSULE ORAL 3 TIMES DAILY PRN
Qty: 21 CAPSULE | Refills: 0 | Status: SHIPPED | OUTPATIENT
Start: 2024-11-10 | End: 2024-11-13

## 2024-11-10 RX ORDER — AMLODIPINE BESYLATE 5 MG/1
5 TABLET ORAL DAILY
Status: DISCONTINUED | OUTPATIENT
Start: 2024-11-10 | End: 2024-11-10 | Stop reason: HOSPADM

## 2024-11-10 RX ORDER — HYDROXYZINE HYDROCHLORIDE 25 MG/1
25 TABLET, FILM COATED ORAL EVERY 4 HOURS PRN
Qty: 21 TABLET | Refills: 0 | Status: SHIPPED | OUTPATIENT
Start: 2024-11-10 | End: 2024-11-13

## 2024-11-10 RX ORDER — ADHESIVE BANDAGE 3/4"
1 BANDAGE TOPICAL 2 TIMES DAILY
Qty: 1 EACH | Refills: 0 | Status: SHIPPED | OUTPATIENT
Start: 2024-11-10

## 2024-11-10 RX ADMIN — HYDROXYZINE HYDROCHLORIDE 25 MG: 25 TABLET, FILM COATED ORAL at 12:51

## 2024-11-10 RX ADMIN — BUPRENORPHINE AND NALOXONE 1 TABLET: 8; 2 TABLET SUBLINGUAL at 08:23

## 2024-11-10 RX ADMIN — THIAMINE HCL TAB 100 MG 100 MG: 100 TAB at 08:22

## 2024-11-10 RX ADMIN — HYDROXYZINE HYDROCHLORIDE 25 MG: 25 TABLET, FILM COATED ORAL at 16:46

## 2024-11-10 RX ADMIN — Medication 1 TABLET: at 08:22

## 2024-11-10 RX ADMIN — HYDROXYZINE HYDROCHLORIDE 25 MG: 25 TABLET, FILM COATED ORAL at 08:27

## 2024-11-10 RX ADMIN — FOLIC ACID 1 MG: 1 TABLET ORAL at 08:23

## 2024-11-10 RX ADMIN — GABAPENTIN 300 MG: 300 CAPSULE ORAL at 11:25

## 2024-11-10 RX ADMIN — AMLODIPINE BESYLATE 5 MG: 5 TABLET ORAL at 11:25

## 2024-11-10 ASSESSMENT — ACTIVITIES OF DAILY LIVING (ADL)
ADLS_ACUITY_SCORE: 0

## 2024-11-10 NOTE — PROGRESS NOTES
Brief Medicine Note:    # Hypertension: Medicine following the periphery for blood pressure.  Admission 9 medications.  Trending in the 120s-190s/80s-100s in the past 24 hours.   - Start amlodipine 5 mg, give 60 day Rx at discharge  - Blood pressure cuff Rx provided with instructions to trend blood pressure twice a day      30 MINUTES SPENT BY ME on the date of service doing chart review, history, exam, documentation & further activities per the note      CARIE Portillo, CNP   Internal Medicine ALYCE Hospitalist  [facility]  [phone]  Securely message with the Vocera Web Console*  Text page via [employer] Paging/Directory*  Text page via [employer] Messaging System*    *Please check [employer]brain for appropriate on-call    provider prior to paging.

## 2024-11-10 NOTE — PROGRESS NOTES
[address]     Case Management Encounter: Met with team, discussed patient progress, discharge plan and any impediments to discharge.    Writer met with pt to discuss discharge and aftercare planning. Pt reported he has been drinking since age 14 and over the last 2-3 years his use has increased. Pt denied needing any help from case management at this time. He reported he has completed a BRENT assessment with [facility] and is scheduled to start their virtual IOP program on 11/11/24 at 8am.     Insurance: [employer]     Legal Status:  Voluntary       SUDs Assessment Status: Not needed.     ROIs on file: None at this time.     Living Situation: Lives with wife and 3 children     Current Providers, Supports & Collateral:  PCP, ALBERT, wife    Current Plan/Referral Status: Return home and attend virtual IOP w/[facility].       ALBERT Morgan  [address] - Adult Inpatient Addiction Psychiatry Unit

## 2024-11-10 NOTE — PLAN OF CARE
Problem: Substance Withdrawal  Goal: Substance Withdrawal  Description: Signs and symptoms of listed problems will be absent or manageable.  Outcome: Progressing   Goal Outcome Evaluation:     Pt active and engaged on the unit, socialized with select peers, MSSWON 6/6. Pt on Suboxone 8-2 film daily, has supply at home, Bp slightly elevated, restarted on Norvasc, # Hypertension: Medicine following the periphery for blood pressure.  Admission 9 medications.  Trending in the 120s-190s/80s-100s in the past 24 hours.   - Start amlodipine 5 mg, give 60 day Rx at discharge  - Blood pressure cuff Rx provided with instructions to trend blood pressure twice a day       denies SI/HI, pt is highly anxious and tolerated hydroxyzine and Gabapentin with effect, pt to discharge with med and start [facility] virtual IOP programming tomorrow morning. He will be out of detox at 5 pm and hopefully discharge to home with wife.

## 2024-11-10 NOTE — PROGRESS NOTES
MSSA 6: Pt BP slightly elevated. He had his last valium  yesterday 1718.  Pt scored <8 on MSSA for >24 hours. He has not required medication for withdrawal for >24 hours.  Pt has been removed from detox status per unit protocol. .BP (!) 145/92 (BP Location: Left arm)   Pulse 103   Temp 98.4  F (36.9  C) (Oral)   Resp 16    SpO2 98%    Patient discharged to home. Reports being picked up by wife. Patient escorted off the unit by psych associate, Austin.       All patient belongings from room, locked bin and security were sent with patient.  This RN reviewed discharge instructions, teachings, patient's labs, and discharge recommendations with patient. This RN reviewed patient's prescribed medications being sent home with patient from pharmacy. Patient verbalizes and demonstrates understanding of all teachings.  Patient denied thoughts of harm towards self or others. Pt denies any current medical issues at this time. Patient denies any further questions and is now discharged at this time.1710

## 2024-11-10 NOTE — PLAN OF CARE
Goal Outcome Evaluation:             Received a call from pts wife with a request from the wife for an update, pt wife requesting for pt to be medically managed and to have an update when the pt discharges AMA,  writer informed wife that pt had not requested to leave and was calmly sitting across from nursing station working on paperwork, pt wife marck stated that she had just gotten off the phone with the patient who was demanding to leave and she was afraid that he was going to go straight to the liquor store.       She stated that the patient was voluntary and would appreciate if we would try and keep the pt admitted for his safety, we informed her that he had received Valium at 5.15pm and was having severe withdrawals  and had an elevated blood pressure and pulse. MSSA 10, Valium 10mg given.and would not be out of detox till 24 hour later. Pt wife insisting on a call back when pt discharges AMA.    Pt upset and stating that he wants to leave AMA, threatening to call the police, education given on intent to leave paperwork, pt decided to wait and talk to provider in the morning. Pt calmly sitting in the lounge with peers.     Declined to sign paperwork at this time.    Hydroxyzine given at 5.15pm slightly effective, pt states he is highly anxious being here with nothing to do.    Zyprexa 10mg given, wife marck updated that Pt agreed to stay the night. MSSA 6 at 2000. Trazodone and Tylenol given as needed at HS.

## 2024-11-10 NOTE — PLAN OF CARE
Problem: Substance Withdrawal  Goal: Substance Withdrawal  Description: Signs and symptoms of listed problems will be absent or manageable.  Outcome: Progressing   Goal Outcome Evaluation:    MSSA scores of 5/6,patient received 10mg of valium for alcohol withdrawal and is observed to sleep throughout the noc.

## 2024-11-10 NOTE — DISCHARGE SUMMARY
"PSYCHIATRY  DISCHARGE SUMMARY     DATE OF DISCHARGE   11/10/2024       DISCHARGE DIAGNOSIS     Alcohol Use Disorder, severe  ROSARIO   Opioid Use Disorder, severe, in remission  Use of MAT: Buprenorphine  Stimulant Use Disorder by hx  ADHD by hx  MDD by hx     ELISHA by hx  Benign essential hypertension by hx  Lumbar disc herniation with radiculopathy  by hx    Patient Active Problem List   Diagnosis    CARDIOVASCULAR SCREENING; LDL GOAL LESS THAN 160    Lumbar disc herniation with radiculopathy    ELISHA (obstructive sleep apnea)    Benign essential hypertension    Opioid dependence in remission (H)    Attention deficit hyperactivity disorder (ADHD), combined type    Stimulant dependence (H)    Alcohol withdrawal (H)        REASON FOR ADMISSION   As per H&P:   Liban is a 44-year-old male admitted to [facility] for detoxification of alcohol.  Patient reports drinking approximately 1 L of hard alcohol daily for the past 6 months and prior to that approximately half a liter for years.  Patient presented to ED after an inability to manage alcohol withdrawal symptoms at home.  Patient states that he knows \"it is time\" to stop drinking.  Patient states that his historical opioid use disorder which is currently being treated with buprenorphine may have escalated his drinking patterns.  Patient reports initially using Percocet secondary to lumbar pathologies.  Denies other illicit substances.  Patient reporting significant anxiety stating that his body is \"always in the fight or flight mode\".  Patient is open to pharmacological interventions as well as outpatient CD treatment.     Chart review:   As per ED MD dated 11/9/24:   Timmy Yousif is a 44 year old male with a history of opioid use disorder and alcohol use disorder who presents for altered mental status and agitation.  History is obtained from the patient and from his wife.  The patient has been struggling recently with alcohol use disorder has been trying to quit alcohol.  " "He was trying to manage this on his own at home and this is not going well.  Today he was feeling much worse, was diaphoretic, felt like his heart was racing, reports feeling extremely uncomfortable and then drink alcohol again to try to help with his symptoms.  He is remorseful of this and just wants to quit.  No vomiting.  He has been able to take his Suboxone without issue.  No abdominal pain.      HOSPITAL COURSE   On day of discharge, pt quite impatient and anxious. Pt requesting to discharge despite wife urging him to stay an additional night. Some lingering withdrawal sx however \"out of detox\" per unit protocol. Pt defensive and irritable however was agreeable to inpatient tx if he were to relapse again. Wife states he is not welcome at home if he is drinking.       Admitted due to aforementioned presentation.  Education regarding diagnostic and treatment options with risks, benefits and alternatives and adequate verbalization of understanding.         Treatment Plan:      Ongoing education given regarding diagnostic and treatment options with risks, benefits and alternatives and adequate verbalization of understanding.     Legal: Voluntary     Substance Withdrawal:   - MSSA with symptom driven Valium for alcohol withdrawal     Psychiatric Interventions:   Medication: PTA medications reviewed  11/9/24:   - Continue buprenorphine-naloxone 8-2 mg sublingual daily  - Initiate Neurontin 300 mg 3 times daily as needed for anxiety and neuropathic pain  - Patient would like to initiate Lexapro however will hold off until withdrawal is further controlled  - Would like to review literature re: naltrexone versus acamprosate     Precautions: Withdrawal  Observations: q 15  Labs/Imaging: no new orders     Medical Interventions:  -Folate, MVI, thiamine per protocol     Consults: Hospitalist per protocol for detox admission     Multidisciplinary Interventions:  to gather collateral information, coordinate " "care with outpatient providers and begin follow up/discharge planning.   - Would like to attend outpatient CD tx.   - Will need referral for outpatient psychiatric clinician in the [place] area     Disposition: Outpatient dependency treatment    As per Case Management Encounter: Met with team, discussed patient progress, discharge plan and any impediments to discharge.     Writer met with pt to discuss discharge and aftercare planning. Pt reported he has been drinking since age 14 and over the last 2-3 years his use has increased. Pt denied needing any help from case management at this time. He reported he has completed a [facility] assessment with [facility] and is scheduled to start their virtual IOP program on 11/11/24 at 8am.     As per nursing documentation on day of discharge:    Pt active and engaged on the unit, socialized with select peers, MSSA 6/6. Pt on Suboxone 8-2 film daily, has supply at home, Bp slightly elevated, restarted on Norvasc, # Hypertension: Medicine following the periphery for blood pressure.  Admission 9 medications.  Trending in the 120s-190s/80s-100s in the past 24 hours.   - Start amlodipine 5 mg, give 60 day Rx at discharge  - Blood pressure cuff Rx provided with instructions to trend blood pressure twice a day        denies SI/HI, pt is highly anxious and tolerated hydroxyzine and Gabapentin with effect, pt to discharge with med and start [facility] virtual IOP programming tomorrow morning. He will be out of detox at 5 pm and hopefully discharge to home with wife.     MENTAL STATUS EXAM   Vitals: BP (!) 160/93 (BP Location: Right arm, Patient Position: Sitting, Cuff Size: Adult Regular)   Pulse 100   Temp 97.1  F (36.2  C) (Temporal)   Resp 16   Ht 1.88 m (6' 2\")   Wt 88.5 kg (195 lb)   SpO2 99%   BMI 25.04 kg/m      Mental Status Exam:  Appearance: awake, alert and anxious   Attitude:  slightly uncooperative  Eye Contact:  fair  Mood:  anxious and deffensive, " "irritable  Affect:  mood congruent  Speech:  clear, coherent  Psychomotor Behavior:  tremor observed   Muscle strength and tone: wnl  Thought Process:  logical  Associations:  no loose associations  Thought Content:  no evidence of suicidal ideation or homicidal ideation and no evidence of psychotic thought  Insight:  fair  Judgement:  fair  Oriented to:  time, person, and place  Attention Span and Concentration:  poor  Recent and Remote Memory:  fair        Pt quite impatient and anxious. Pt requesting to discharge despite wife urging him to stay an additional night. Some lingering withdrawal sx however \"out of detox\" per unit protocol. Pt defensive and irritable however was agreeable to inpatient tx if he were to relapse again. Wife states he is not welcome at home if he is drinking.          DISCHARGE MEDICATIONS   Scheduled Meds:  Current Facility-Administered Medications   Medication Dose Route Frequency Provider Last Rate Last Admin    amLODIPine (NORVASC) tablet 5 mg  5 mg Oral Daily Josefina Lucia APRN CNP   5 mg at 11/10/24 1125    buprenorphine-naloxone (SUBOXONE) 8-2 MG sublingual tablet 1 tablet  1 tablet Sublingual Daily Darlin Munoz APRN CNP   1 tablet at 11/10/24 0823    folic acid (FOLVITE) tablet 1 mg  1 mg Oral Daily Sandra Coley MD   1 mg at 11/10/24 0823    multivitamin w/minerals (THERA-VIT-M) tablet 1 tablet  1 tablet Oral Daily Sandra Coley MD   1 tablet at 11/10/24 0822    thiamine (B-1) tablet 100 mg  100 mg Oral Daily Sandra Coley MD   100 mg at 11/10/24 0822     Continuous Infusions:  Current Facility-Administered Medications   Medication Dose Route Frequency Provider Last Rate Last Admin     PRN Meds:.  Current Facility-Administered Medications   Medication Dose Route Frequency Provider Last Rate Last Admin    acetaminophen (TYLENOL) tablet 650 mg  650 mg Oral Q4H PRN Sandra Coley MD   650 mg at 11/09/24 2036    alum & mag hydroxide-simethicone " (MAALOX) suspension 30 mL  30 mL Oral Q4H PRN Sandra Coley MD        diazepam (VALIUM) tablet 5-20 mg  5-20 mg Oral Q30 Min PRN Sandra Coley MD   10 mg at 11/09/24 1716    gabapentin (NEURONTIN) capsule 300 mg  300 mg Oral TID PRN Darlin Munoz APRN CNP   300 mg at 11/10/24 1125    hydrALAZINE (APRESOLINE) tablet 10 mg  10 mg Oral 4x Daily PRN Josefina Lucia APRN CNP   10 mg at 11/09/24 1717    hydrOXYzine HCl (ATARAX) tablet 25 mg  25 mg Oral Q4H PRN Sandra Coley MD   25 mg at 11/10/24 1251    OLANZapine (zyPREXA) tablet 10 mg  10 mg Oral TID PRN Sandra Coley MD   10 mg at 11/09/24 1927    Or    OLANZapine (zyPREXA) injection 10 mg  10 mg Intramuscular TID PRN Sandra Coley MD        ondansetron (ZOFRAN ODT) ODT tab 4 mg  4 mg Oral Q6H PRN Heraclio Liang MD   4 mg at 11/09/24 1720    senna-docusate (SENOKOT-S/PERICOLACE) 8.6-50 MG per tablet 1 tablet  1 tablet Oral BID PRN Sandra Coley MD        traZODone (DESYREL) tablet 50 mg  50 mg Oral At Bedtime PRN Sandra Coley MD   50 mg at 11/09/24 2036       Medication side effects: no         DISCHARGE PLAN   1.  Education given regarding diagnostic and treatment options with risks, benefits and alternatives with adequate verbalization of understanding.  2.  Discharge to home. Will participated in virtual outpatient CD tx.  Upon detailed review of risk factors, patient amenable for release.   3.  Continue aforementioned medications and associated medication changes with follow-up by outpatient mental health provider.  4.  Crisis management planning in place.    5.  Continue efforts for sobriety.  6. Nursing and  to review further discharge recommendations.   7. Pt and wife agreeable to inpatient CD treatment if pt were to relapse again.       TOTAL TIME: 35 minutes for discharge planning.        CARIE Song CNP on 11/12/2024 at 12:22 PM

## 2024-11-11 RX ORDER — AMLODIPINE BESYLATE 5 MG/1
5 TABLET ORAL DAILY
Qty: 30 TABLET | Refills: 1 | Status: SHIPPED | OUTPATIENT
Start: 2024-11-11

## 2024-11-13 RX ORDER — HYDROXYZINE HYDROCHLORIDE 25 MG/1
25 TABLET, FILM COATED ORAL EVERY 4 HOURS PRN
Qty: 42 TABLET | Refills: 0 | Status: SHIPPED | OUTPATIENT
Start: 2024-11-13

## 2024-11-13 RX ORDER — GABAPENTIN 300 MG/1
300 CAPSULE ORAL 3 TIMES DAILY PRN
Qty: 42 CAPSULE | Refills: 0 | Status: SHIPPED | OUTPATIENT
Start: 2024-11-13

## 2024-11-17 ENCOUNTER — HEALTH MAINTENANCE LETTER (OUTPATIENT)
Age: 44
End: 2024-11-17

## 2025-05-15 ENCOUNTER — TRANSFERRED RECORDS (OUTPATIENT)
Dept: HEALTH INFORMATION MANAGEMENT | Facility: CLINIC | Age: 45
End: 2025-05-15
Payer: COMMERCIAL